# Patient Record
Sex: MALE | Race: BLACK OR AFRICAN AMERICAN | NOT HISPANIC OR LATINO
[De-identification: names, ages, dates, MRNs, and addresses within clinical notes are randomized per-mention and may not be internally consistent; named-entity substitution may affect disease eponyms.]

---

## 2017-01-03 ENCOUNTER — APPOINTMENT (OUTPATIENT)
Dept: OPHTHALMOLOGY | Facility: CLINIC | Age: 57
End: 2017-01-03

## 2017-06-26 ENCOUNTER — INPATIENT (INPATIENT)
Facility: HOSPITAL | Age: 57
LOS: 1 days | Discharge: HOME | End: 2017-06-28
Attending: INTERNAL MEDICINE | Admitting: INTERNAL MEDICINE
Payer: COMMERCIAL

## 2017-06-26 VITALS
HEART RATE: 92 BPM | WEIGHT: 179.9 LBS | OXYGEN SATURATION: 99 % | DIASTOLIC BLOOD PRESSURE: 80 MMHG | TEMPERATURE: 98 F | SYSTOLIC BLOOD PRESSURE: 153 MMHG | HEIGHT: 71 IN | RESPIRATION RATE: 20 BRPM

## 2017-06-26 LAB
ACETONE SERPL-MCNC: NEGATIVE — SIGNIFICANT CHANGE UP
ALBUMIN SERPL ELPH-MCNC: 3.6 G/DL — SIGNIFICANT CHANGE UP (ref 3.3–5)
ALP SERPL-CCNC: 89 U/L — SIGNIFICANT CHANGE UP (ref 40–120)
ALT FLD-CCNC: 21 U/L — SIGNIFICANT CHANGE UP (ref 12–78)
ANION GAP SERPL CALC-SCNC: 5 MMOL/L — SIGNIFICANT CHANGE UP (ref 5–17)
APPEARANCE UR: CLEAR — SIGNIFICANT CHANGE UP
AST SERPL-CCNC: 11 U/L — LOW (ref 15–37)
BACTERIA # UR AUTO: ABNORMAL
BASOPHILS # BLD AUTO: 0 K/UL — SIGNIFICANT CHANGE UP (ref 0–0.2)
BASOPHILS NFR BLD AUTO: 0.7 % — SIGNIFICANT CHANGE UP (ref 0–2)
BILIRUB SERPL-MCNC: 0.3 MG/DL — SIGNIFICANT CHANGE UP (ref 0.2–1.2)
BILIRUB UR-MCNC: NEGATIVE — SIGNIFICANT CHANGE UP
BUN SERPL-MCNC: 34 MG/DL — HIGH (ref 7–23)
CALCIUM SERPL-MCNC: 8.1 MG/DL — LOW (ref 8.5–10.1)
CHLORIDE SERPL-SCNC: 107 MMOL/L — SIGNIFICANT CHANGE UP (ref 96–108)
CO2 SERPL-SCNC: 23 MMOL/L — SIGNIFICANT CHANGE UP (ref 22–31)
COLOR SPEC: YELLOW — SIGNIFICANT CHANGE UP
CREAT SERPL-MCNC: 2.25 MG/DL — HIGH (ref 0.5–1.3)
DIFF PNL FLD: NEGATIVE — SIGNIFICANT CHANGE UP
EOSINOPHIL # BLD AUTO: 0 K/UL — SIGNIFICANT CHANGE UP (ref 0–0.5)
EOSINOPHIL NFR BLD AUTO: 0.5 % — SIGNIFICANT CHANGE UP (ref 0–6)
EPI CELLS # UR: NEGATIVE — SIGNIFICANT CHANGE UP
GLUCOSE SERPL-MCNC: 421 MG/DL — HIGH (ref 70–99)
GLUCOSE UR QL: 1000 MG/DL
HCT VFR BLD CALC: 42 % — SIGNIFICANT CHANGE UP (ref 39–50)
HGB BLD-MCNC: 13.9 G/DL — SIGNIFICANT CHANGE UP (ref 13–17)
HYALINE CASTS # UR AUTO: ABNORMAL /LPF
KETONES UR-MCNC: NEGATIVE — SIGNIFICANT CHANGE UP
LEUKOCYTE ESTERASE UR-ACNC: NEGATIVE — SIGNIFICANT CHANGE UP
LYMPHOCYTES # BLD AUTO: 0.9 K/UL — LOW (ref 1–3.3)
LYMPHOCYTES # BLD AUTO: 12.2 % — LOW (ref 13–44)
MCHC RBC-ENTMCNC: 25.1 PG — LOW (ref 27–34)
MCHC RBC-ENTMCNC: 33 GM/DL — SIGNIFICANT CHANGE UP (ref 32–36)
MCV RBC AUTO: 76.2 FL — LOW (ref 80–100)
MONOCYTES # BLD AUTO: 0.3 K/UL — SIGNIFICANT CHANGE UP (ref 0–0.9)
MONOCYTES NFR BLD AUTO: 3.8 % — SIGNIFICANT CHANGE UP (ref 2–14)
NEUTROPHILS # BLD AUTO: 5.9 K/UL — SIGNIFICANT CHANGE UP (ref 1.8–7.4)
NEUTROPHILS NFR BLD AUTO: 82.9 % — HIGH (ref 43–77)
NITRITE UR-MCNC: NEGATIVE — SIGNIFICANT CHANGE UP
PH UR: 5 — SIGNIFICANT CHANGE UP (ref 5–8)
PLATELET # BLD AUTO: 236 K/UL — SIGNIFICANT CHANGE UP (ref 150–400)
POTASSIUM SERPL-MCNC: 6.4 MMOL/L — CRITICAL HIGH (ref 3.5–5.3)
POTASSIUM SERPL-SCNC: 6.4 MMOL/L — CRITICAL HIGH (ref 3.5–5.3)
PROT SERPL-MCNC: 7 GM/DL — SIGNIFICANT CHANGE UP (ref 6–8.3)
PROT UR-MCNC: 100 MG/DL
RBC # BLD: 5.51 M/UL — SIGNIFICANT CHANGE UP (ref 4.2–5.8)
RBC # FLD: 15.7 % — HIGH (ref 11–15)
RBC CASTS # UR COMP ASSIST: SIGNIFICANT CHANGE UP /HPF (ref 0–4)
SODIUM SERPL-SCNC: 135 MMOL/L — SIGNIFICANT CHANGE UP (ref 135–145)
SP GR SPEC: 1.01 — SIGNIFICANT CHANGE UP (ref 1.01–1.02)
UROBILINOGEN FLD QL: NEGATIVE MG/DL — SIGNIFICANT CHANGE UP
WBC # BLD: 7.1 K/UL — SIGNIFICANT CHANGE UP (ref 3.8–10.5)
WBC # FLD AUTO: 7.1 K/UL — SIGNIFICANT CHANGE UP (ref 3.8–10.5)

## 2017-06-26 PROCEDURE — 99285 EMERGENCY DEPT VISIT HI MDM: CPT

## 2017-06-26 PROCEDURE — 99223 1ST HOSP IP/OBS HIGH 75: CPT

## 2017-06-26 RX ORDER — DEXTROSE 50 % IN WATER 50 %
1 SYRINGE (ML) INTRAVENOUS ONCE
Qty: 0 | Refills: 0 | Status: DISCONTINUED | OUTPATIENT
Start: 2017-06-26 | End: 2017-06-27

## 2017-06-26 RX ORDER — DIPHENHYDRAMINE HCL 50 MG
25 CAPSULE ORAL ONCE
Qty: 0 | Refills: 0 | Status: COMPLETED | OUTPATIENT
Start: 2017-06-26 | End: 2017-06-26

## 2017-06-26 RX ORDER — INSULIN HUMAN 100 [IU]/ML
10 INJECTION, SOLUTION SUBCUTANEOUS ONCE
Qty: 0 | Refills: 0 | Status: COMPLETED | OUTPATIENT
Start: 2017-06-26 | End: 2017-06-26

## 2017-06-26 RX ORDER — CALCIUM GLUCONATE 100 MG/ML
2 VIAL (ML) INTRAVENOUS ONCE
Qty: 2 | Refills: 0 | Status: COMPLETED | OUTPATIENT
Start: 2017-06-26 | End: 2017-06-26

## 2017-06-26 RX ORDER — ALBUTEROL 90 UG/1
2.5 AEROSOL, METERED ORAL ONCE
Qty: 0 | Refills: 0 | Status: COMPLETED | OUTPATIENT
Start: 2017-06-26 | End: 2017-06-26

## 2017-06-26 RX ORDER — INSULIN GLARGINE 100 [IU]/ML
10 INJECTION, SOLUTION SUBCUTANEOUS ONCE
Qty: 0 | Refills: 0 | Status: COMPLETED | OUTPATIENT
Start: 2017-06-26 | End: 2017-06-27

## 2017-06-26 RX ORDER — HEPARIN SODIUM 5000 [USP'U]/ML
5000 INJECTION INTRAVENOUS; SUBCUTANEOUS EVERY 12 HOURS
Qty: 0 | Refills: 0 | Status: DISCONTINUED | OUTPATIENT
Start: 2017-06-26 | End: 2017-06-28

## 2017-06-26 RX ORDER — GLUCAGON INJECTION, SOLUTION 0.5 MG/.1ML
1 INJECTION, SOLUTION SUBCUTANEOUS ONCE
Qty: 0 | Refills: 0 | Status: DISCONTINUED | OUTPATIENT
Start: 2017-06-26 | End: 2017-06-27

## 2017-06-26 RX ORDER — INSULIN LISPRO 100/ML
VIAL (ML) SUBCUTANEOUS
Qty: 0 | Refills: 0 | Status: DISCONTINUED | OUTPATIENT
Start: 2017-06-26 | End: 2017-06-27

## 2017-06-26 RX ORDER — SODIUM CHLORIDE 9 MG/ML
1000 INJECTION, SOLUTION INTRAVENOUS
Qty: 0 | Refills: 0 | Status: DISCONTINUED | OUTPATIENT
Start: 2017-06-26 | End: 2017-06-27

## 2017-06-26 RX ORDER — DEXTROSE 50 % IN WATER 50 %
12.5 SYRINGE (ML) INTRAVENOUS ONCE
Qty: 0 | Refills: 0 | Status: DISCONTINUED | OUTPATIENT
Start: 2017-06-26 | End: 2017-06-27

## 2017-06-26 RX ORDER — DEXTROSE 50 % IN WATER 50 %
25 SYRINGE (ML) INTRAVENOUS ONCE
Qty: 0 | Refills: 0 | Status: DISCONTINUED | OUTPATIENT
Start: 2017-06-26 | End: 2017-06-27

## 2017-06-26 RX ORDER — SODIUM CHLORIDE 9 MG/ML
1000 INJECTION INTRAMUSCULAR; INTRAVENOUS; SUBCUTANEOUS
Qty: 0 | Refills: 0 | Status: DISCONTINUED | OUTPATIENT
Start: 2017-06-26 | End: 2017-06-28

## 2017-06-26 RX ORDER — SODIUM POLYSTYRENE SULFONATE 4.1 MEQ/G
30 POWDER, FOR SUSPENSION ORAL ONCE
Qty: 0 | Refills: 0 | Status: COMPLETED | OUTPATIENT
Start: 2017-06-26 | End: 2017-06-26

## 2017-06-26 RX ORDER — SODIUM CHLORIDE 9 MG/ML
3000 INJECTION INTRAMUSCULAR; INTRAVENOUS; SUBCUTANEOUS ONCE
Qty: 0 | Refills: 0 | Status: COMPLETED | OUTPATIENT
Start: 2017-06-26 | End: 2017-06-26

## 2017-06-26 RX ADMIN — SODIUM CHLORIDE 3000 MILLILITER(S): 9 INJECTION INTRAMUSCULAR; INTRAVENOUS; SUBCUTANEOUS at 19:45

## 2017-06-26 RX ADMIN — Medication 25 MILLIGRAM(S): at 19:45

## 2017-06-26 RX ADMIN — ALBUTEROL 2.5 MILLIGRAM(S): 90 AEROSOL, METERED ORAL at 22:09

## 2017-06-26 RX ADMIN — INSULIN HUMAN 10 UNIT(S): 100 INJECTION, SOLUTION SUBCUTANEOUS at 22:10

## 2017-06-26 RX ADMIN — SODIUM POLYSTYRENE SULFONATE 30 GRAM(S): 4.1 POWDER, FOR SUSPENSION ORAL at 22:10

## 2017-06-26 RX ADMIN — Medication 200 GRAM(S): at 22:47

## 2017-06-26 NOTE — ED PROVIDER NOTE - PHYSICAL EXAMINATION
Gen: Alert, Well appearing. NAD    Head: NC, AT, PERRL, EOMI, normal lids/conjunctiva   ENT: Bilateral TM WNL, normal hearing, patent oropharynx without erythema/exudate, uvula midline  Neck: supple, no tenderness/meningismus/JVD   Pulm: Bilateral clear BS, normal resp effort, no wheeze/stridor/retractions  CV: RRR, no M/R/G, +dist pulses   Abd: soft, NT/ND, +BS, no guarding/rebound tenderness  Mskel: no edema/erythema/cyanosis   Skin: ++ multiple papules to testicular and penile area.   Neuro: AAOx3, no sensory/motor deficits, CN 2-12 intact

## 2017-06-26 NOTE — H&P ADULT - NSHPPHYSICALEXAM_GEN_ALL_CORE
Vital Signs Last 24 Hrs  T(C): 36.4, Max: 36.7 (06-26 @ 19:06)  T(F): 97.6, Max: 98.1 (06-26 @ 19:06)  HR: 96 (88 - 96)  BP: 165/95 (150/89 - 165/95)  BP(mean): --  RR: 18 (18 - 20)  SpO2: 100% (98% - 100%)    PHYSICAL EXAM:  GENERAL: NAD, well-groomed, well-developed  HEAD:  Atraumatic, Normocephalic  EYES: EOMI, PERRLA, conjunctiva and sclera clear  ENMT: No tonsillar erythema, exudates, or enlargement; dry mucous membranes,  NECK: Supple, No JVD, Normal thyroid  NERVOUS SYSTEM:  Alert & Oriented X3, Good concentration; Motor Strength 5/5 B/L upper and lower extremities; decreased sensation both feet  CHEST/LUNG: Clear to percussion bilaterally; No rales, rhonchi, wheezing, or rubs  HEART: Regular rate and rhythm; No murmurs, rubs, or gallops  ABDOMEN: Soft, Nontender, Nondistended; Bowel sounds present  EXTREMITIES:  + Peripheral Pulses, No clubbing, cyanosis, or edema  LYMPH: No lymphadenopathy noted  SKIN: + rash most marked in scrotum

## 2017-06-26 NOTE — H&P ADULT - NSHPREVIEWOFSYSTEMS_GEN_ALL_CORE
REVIEW OF SYSTEMS:  CONSTITUTIONAL: No fever, weight loss, or fatigue  EYES: No eye pain, visual disturbances, or discharge  ENMT:  No difficulty hearing, tinnitus, vertigo; No sinus or throat pain  NECK: No pain or stiffness  RESPIRATORY: No cough, wheezing, chills or hemoptysis; No shortness of breath  CARDIOVASCULAR: No chest pain, palpitations, dizziness, or leg swelling  GASTROINTESTINAL: No abdominal or epigastric pain. No nausea, vomiting, or hematemesis; No diarrhea or constipation. No melena or hematochezia.  GENITOURINARY: No dysuria, frequency, hematuria, or incontinence  NEUROLOGICAL: No headaches, no memory loss, no loss of strength, + numbness, no tremors  SKIN: + itching, + rashes and lesions   LYMPH NODES: No enlarged glands  ENDOCRINE: No heat or cold intolerance; No hair loss  MUSCULOSKELETAL: No joint pain or swelling; No muscle, back, or extremity pain  PSYCHIATRIC: No depression, anxiety, mood swings, or difficulty sleeping  HEME/LYMPH: No easy bruising, or bleeding gums  ALLERGY AND IMMUNOLOGIC: No hives or eczema

## 2017-06-26 NOTE — H&P ADULT - ASSESSMENT
57 y/o diabetic hypertensive man, presents with hyperkalemia, hyperglycemia and JORGITO. Recently stated on prednisone for itching and rash.  Most likely uncontrolled diabetes due to steroids, with prerenal azotemia due to dehydration and secondary hyperkalemia Pt treated in ED with albuterol, calcium,  insulin and kayexelate, with saline bolus.

## 2017-06-26 NOTE — ED PROVIDER NOTE - CARE PLAN
Principal Discharge DX:	Acute renal failure  Secondary Diagnosis:	Hyperkalemia  Secondary Diagnosis:	Hyperglycemia

## 2017-06-26 NOTE — ED ADULT TRIAGE NOTE - CHIEF COMPLAINT QUOTE
Stated " I'm a diabetic , I'm on insulin , but lost my pump , had not  taking it for  2days, now I'm feeling itchy  all over , ,my feet are numbs since this morning "  No facial droop , no arm drift , equal b/l arms strength , onset of numbness was 10 hours ago

## 2017-06-26 NOTE — ED ADULT NURSE NOTE - OBJECTIVE STATEMENT
pt lost insulin pump, has not taken insulin since yesterday. pt c/o itching on back, belly, privates with hives

## 2017-06-26 NOTE — ED ADULT NURSE NOTE - PMH
Diabetes    Type II or unspecified type diabetes mellitus without mention of complication, not stated as uncontr    Unspecified essential hypertension

## 2017-06-26 NOTE — H&P ADULT - HISTORY OF PRESENT ILLNESS
57 y/o male with pmh DM2 on lantus and metformin, HTN, diffuse pruritus x 1 month with rash to groin area. Pt's PMD started him on tapering dose prednisone for rash, since then has had increased thirst and urination, but no improvement in rash. Pt has no change in housing, bedding, travel, detergents, meds, sexual activity. No fever, dysuria, diarrhea constipation. No fever/chills. No photophobia/eye pain/changes in vision, No ear pain/sore throat/dysphagia, No chest pain/palpitations. No SOB/cough/stridor. No abdominal pain, N/V/D, no black/bloody bm. No dysuria/frequency/discharge, No headache. No Dizziness.  + rash.  + numbness feet, no tingling/weakness. Pt on losartan with HCTZ which he has been taking.

## 2017-06-26 NOTE — H&P ADULT - PROBLEM SELECTOR PROBLEM 2
Type 2 diabetes mellitus with diabetic autonomic neuropathy, without long-term current use of insulin

## 2017-06-26 NOTE — H&P ADULT - NSHPLABSRESULTS_GEN_ALL_CORE
LABS:                        13.9   7.1   )-----------( 236      ( 2017 19:54 )             42.0         135  |  107  |  34<H>  ----------------------------<  421<H>  6.4<HH>   |  23  |  2.25<H>    Ca    8.1<L>      2017 20:37    TPro  7.0  /  Alb  3.6  /  TBili  0.3  /  DBili  x   /  AST  11<L>  /  ALT  21  /  AlkPhos  89        Urinalysis Basic - ( 2017 19:54 )    Color: Yellow / Appearance: Clear / S.010 / pH: x  Gluc: x / Ketone: Negative  / Bili: Negative / Urobili: Negative mg/dL   Blood: x / Protein: 100 mg/dL / Nitrite: Negative   Leuk Esterase: Negative / RBC: Occasional /HPF / WBC x   Sq Epi: x / Non Sq Epi: Negative / Bacteria: Occasional      CAPILLARY BLOOD GLUCOSE  111 (2017 23:59)  491 (2017 17:53)      RADIOLOGY & ADDITIONAL TESTS:    Imaging Personally Reviewed:  [ ] YES  [ ] NO LABS:                        13.9   7.1   )-----------( 236      ( 2017 19:54 )             42.0         135  |  107  |  34<H>  ----------------------------<  421<H>  6.4<HH>   |  23  |  2.25<H>    Ca    8.1<L>      2017 20:37    TPro  7.0  /  Alb  3.6  /  TBili  0.3  /  DBili  x   /  AST  11<L>  /  ALT  21  /  AlkPhos  89        Urinalysis Basic - ( 2017 19:54 )    Color: Yellow / Appearance: Clear / S.010 / pH: x  Gluc: x / Ketone: Negative  / Bili: Negative / Urobili: Negative mg/dL   Blood: x / Protein: 100 mg/dL / Nitrite: Negative   Leuk Esterase: Negative / RBC: Occasional /HPF / WBC x   Sq Epi: x / Non Sq Epi: Negative / Bacteria: Occasional      CAPILLARY BLOOD GLUCOSE  111 (2017 23:59)  491 (2017 17:53)      RADIOLOGY & ADDITIONAL TESTS:    CXR: enlarged heart, no infiltrate    Imaging Personally Reviewed:  [ x] YES  [ ] NO    EKG: sinus@83 T wave inversions 1, AVL, V6

## 2017-06-26 NOTE — ED PROVIDER NOTE - OBJECTIVE STATEMENT
57yo male with pmh DM on insulin pump presents with lost insulin pump, diffuse pruritus x 1 month with rash to groin area and b/l plantar feet numbness noted today. Pt's friend threw insulin pump away accidentally yesterday. Pt seen pmd for pruritus and given prednisone but reports not helping. no new hosing, detergents, meds, sexual activity. No fever, dysuria, diarrhea constipation. Pt reports plantar numbness is intermittent for a long time, related to DM and started again today.     ROS: No fever/chills. No photophobia/eye pain/changes in vision, No ear pain/sore throat/dysphagia, No chest pain/palpitations. No SOB/cough/stridor. No abdominal pain, N/V/D, no black/bloody bm. No dysuria/frequency/discharge, No headache. No Dizziness.  + rash.  + numbness, no tingling/weakness.

## 2017-06-27 DIAGNOSIS — I10 ESSENTIAL (PRIMARY) HYPERTENSION: ICD-10-CM

## 2017-06-27 DIAGNOSIS — N17.9 ACUTE KIDNEY FAILURE, UNSPECIFIED: ICD-10-CM

## 2017-06-27 DIAGNOSIS — R21 RASH AND OTHER NONSPECIFIC SKIN ERUPTION: ICD-10-CM

## 2017-06-27 DIAGNOSIS — E87.5 HYPERKALEMIA: ICD-10-CM

## 2017-06-27 DIAGNOSIS — E11.43 TYPE 2 DIABETES MELLITUS WITH DIABETIC AUTONOMIC (POLY)NEUROPATHY: ICD-10-CM

## 2017-06-27 LAB
ANION GAP SERPL CALC-SCNC: 3 MMOL/L — LOW (ref 5–17)
ANION GAP SERPL CALC-SCNC: 7 MMOL/L — SIGNIFICANT CHANGE UP (ref 5–17)
BASOPHILS # BLD AUTO: 0 K/UL — SIGNIFICANT CHANGE UP (ref 0–0.2)
BASOPHILS NFR BLD AUTO: 0.5 % — SIGNIFICANT CHANGE UP (ref 0–2)
BUN SERPL-MCNC: 24 MG/DL — HIGH (ref 7–23)
BUN SERPL-MCNC: 29 MG/DL — HIGH (ref 7–23)
CALCIUM SERPL-MCNC: 8.1 MG/DL — LOW (ref 8.5–10.1)
CALCIUM SERPL-MCNC: 8.6 MG/DL — SIGNIFICANT CHANGE UP (ref 8.5–10.1)
CHLORIDE SERPL-SCNC: 113 MMOL/L — HIGH (ref 96–108)
CHLORIDE SERPL-SCNC: 113 MMOL/L — HIGH (ref 96–108)
CO2 SERPL-SCNC: 23 MMOL/L — SIGNIFICANT CHANGE UP (ref 22–31)
CO2 SERPL-SCNC: 27 MMOL/L — SIGNIFICANT CHANGE UP (ref 22–31)
CREAT ?TM UR-MCNC: 49 MG/DL — SIGNIFICANT CHANGE UP
CREAT SERPL-MCNC: 1.64 MG/DL — HIGH (ref 0.5–1.3)
CREAT SERPL-MCNC: 1.94 MG/DL — HIGH (ref 0.5–1.3)
CRP SERPL-MCNC: 0.9 MG/DL — HIGH (ref 0–0.4)
CULTURE RESULTS: NO GROWTH — SIGNIFICANT CHANGE UP
EOSINOPHIL # BLD AUTO: 0.2 K/UL — SIGNIFICANT CHANGE UP (ref 0–0.5)
EOSINOPHIL NFR BLD AUTO: 2.2 % — SIGNIFICANT CHANGE UP (ref 0–6)
GLUCOSE SERPL-MCNC: 153 MG/DL — HIGH (ref 70–99)
GLUCOSE SERPL-MCNC: 163 MG/DL — HIGH (ref 70–99)
HBA1C BLD-MCNC: 9.7 % — HIGH (ref 4–5.6)
HCT VFR BLD CALC: 33.8 % — LOW (ref 39–50)
HGB BLD-MCNC: 11.5 G/DL — LOW (ref 13–17)
LYMPHOCYTES # BLD AUTO: 1.6 K/UL — SIGNIFICANT CHANGE UP (ref 1–3.3)
LYMPHOCYTES # BLD AUTO: 21.6 % — SIGNIFICANT CHANGE UP (ref 13–44)
MCHC RBC-ENTMCNC: 25.2 PG — LOW (ref 27–34)
MCHC RBC-ENTMCNC: 33.9 GM/DL — SIGNIFICANT CHANGE UP (ref 32–36)
MCV RBC AUTO: 74.4 FL — LOW (ref 80–100)
MONOCYTES # BLD AUTO: 0.8 K/UL — SIGNIFICANT CHANGE UP (ref 0–0.9)
MONOCYTES NFR BLD AUTO: 11.2 % — SIGNIFICANT CHANGE UP (ref 2–14)
NEUTROPHILS # BLD AUTO: 4.8 K/UL — SIGNIFICANT CHANGE UP (ref 1.8–7.4)
NEUTROPHILS NFR BLD AUTO: 64.6 % — SIGNIFICANT CHANGE UP (ref 43–77)
PLATELET # BLD AUTO: 164 K/UL — SIGNIFICANT CHANGE UP (ref 150–400)
POTASSIUM SERPL-MCNC: 3.9 MMOL/L — SIGNIFICANT CHANGE UP (ref 3.5–5.3)
POTASSIUM SERPL-MCNC: 5.2 MMOL/L — SIGNIFICANT CHANGE UP (ref 3.5–5.3)
POTASSIUM SERPL-SCNC: 3.9 MMOL/L — SIGNIFICANT CHANGE UP (ref 3.5–5.3)
POTASSIUM SERPL-SCNC: 5.2 MMOL/L — SIGNIFICANT CHANGE UP (ref 3.5–5.3)
PROCALCITONIN SERPL-MCNC: <0.05 NG/ML — SIGNIFICANT CHANGE UP (ref 0–0.04)
PROT ?TM UR-MCNC: 27 MG/DL — HIGH (ref 0–12)
PROT/CREAT UR-RTO: 0.6 RATIO — HIGH (ref 0–0.2)
RBC # BLD: 4.55 M/UL — SIGNIFICANT CHANGE UP (ref 4.2–5.8)
RBC # FLD: 15 % — SIGNIFICANT CHANGE UP (ref 11–15)
SODIUM SERPL-SCNC: 143 MMOL/L — SIGNIFICANT CHANGE UP (ref 135–145)
SODIUM SERPL-SCNC: 143 MMOL/L — SIGNIFICANT CHANGE UP (ref 135–145)
SPECIMEN SOURCE: SIGNIFICANT CHANGE UP
T PALLIDUM AB TITR SER: NEGATIVE — SIGNIFICANT CHANGE UP
VIT B12 SERPL-MCNC: 721 PG/ML — SIGNIFICANT CHANGE UP (ref 243–894)
WBC # BLD: 7.4 K/UL — SIGNIFICANT CHANGE UP (ref 3.8–10.5)
WBC # FLD AUTO: 7.4 K/UL — SIGNIFICANT CHANGE UP (ref 3.8–10.5)

## 2017-06-27 PROCEDURE — 71010: CPT | Mod: 26

## 2017-06-27 PROCEDURE — 99232 SBSQ HOSP IP/OBS MODERATE 35: CPT

## 2017-06-27 RX ORDER — DEXTROSE 50 % IN WATER 50 %
25 SYRINGE (ML) INTRAVENOUS ONCE
Qty: 0 | Refills: 0 | Status: DISCONTINUED | OUTPATIENT
Start: 2017-06-27 | End: 2017-06-28

## 2017-06-27 RX ORDER — SODIUM CHLORIDE 9 MG/ML
1000 INJECTION, SOLUTION INTRAVENOUS
Qty: 0 | Refills: 0 | Status: DISCONTINUED | OUTPATIENT
Start: 2017-06-27 | End: 2017-06-28

## 2017-06-27 RX ORDER — GLUCAGON INJECTION, SOLUTION 0.5 MG/.1ML
1 INJECTION, SOLUTION SUBCUTANEOUS ONCE
Qty: 0 | Refills: 0 | Status: DISCONTINUED | OUTPATIENT
Start: 2017-06-27 | End: 2017-06-28

## 2017-06-27 RX ORDER — DEXTROSE 50 % IN WATER 50 %
12.5 SYRINGE (ML) INTRAVENOUS ONCE
Qty: 0 | Refills: 0 | Status: DISCONTINUED | OUTPATIENT
Start: 2017-06-27 | End: 2017-06-28

## 2017-06-27 RX ORDER — INSULIN GLARGINE 100 [IU]/ML
15 INJECTION, SOLUTION SUBCUTANEOUS EVERY MORNING
Qty: 0 | Refills: 0 | Status: DISCONTINUED | OUTPATIENT
Start: 2017-06-27 | End: 2017-06-28

## 2017-06-27 RX ORDER — ASPIRIN/CALCIUM CARB/MAGNESIUM 324 MG
81 TABLET ORAL DAILY
Qty: 0 | Refills: 0 | Status: DISCONTINUED | OUTPATIENT
Start: 2017-06-27 | End: 2017-06-28

## 2017-06-27 RX ORDER — HYDROXYZINE HCL 10 MG
10 TABLET ORAL
Qty: 0 | Refills: 0 | Status: DISCONTINUED | OUTPATIENT
Start: 2017-06-27 | End: 2017-06-28

## 2017-06-27 RX ORDER — KETOCONAZOLE 20 MG/G
1 AEROSOL, FOAM TOPICAL
Qty: 0 | Refills: 0 | Status: DISCONTINUED | OUTPATIENT
Start: 2017-06-27 | End: 2017-06-28

## 2017-06-27 RX ORDER — DEXTROSE 50 % IN WATER 50 %
1 SYRINGE (ML) INTRAVENOUS ONCE
Qty: 0 | Refills: 0 | Status: DISCONTINUED | OUTPATIENT
Start: 2017-06-27 | End: 2017-06-28

## 2017-06-27 RX ORDER — INSULIN LISPRO 100/ML
VIAL (ML) SUBCUTANEOUS
Qty: 0 | Refills: 0 | Status: DISCONTINUED | OUTPATIENT
Start: 2017-06-27 | End: 2017-06-28

## 2017-06-27 RX ORDER — AMLODIPINE BESYLATE 2.5 MG/1
5 TABLET ORAL DAILY
Qty: 0 | Refills: 0 | Status: DISCONTINUED | OUTPATIENT
Start: 2017-06-27 | End: 2017-06-28

## 2017-06-27 RX ORDER — PANTOPRAZOLE SODIUM 20 MG/1
40 TABLET, DELAYED RELEASE ORAL
Qty: 0 | Refills: 0 | Status: DISCONTINUED | OUTPATIENT
Start: 2017-06-27 | End: 2017-06-28

## 2017-06-27 RX ORDER — INSULIN GLARGINE 100 [IU]/ML
15 INJECTION, SOLUTION SUBCUTANEOUS AT BEDTIME
Qty: 0 | Refills: 0 | Status: DISCONTINUED | OUTPATIENT
Start: 2017-06-27 | End: 2017-06-28

## 2017-06-27 RX ORDER — INSULIN LISPRO 100/ML
VIAL (ML) SUBCUTANEOUS AT BEDTIME
Qty: 0 | Refills: 0 | Status: DISCONTINUED | OUTPATIENT
Start: 2017-06-27 | End: 2017-06-28

## 2017-06-27 RX ADMIN — Medication 2: at 11:48

## 2017-06-27 RX ADMIN — Medication 81 MILLIGRAM(S): at 11:50

## 2017-06-27 RX ADMIN — Medication 1: at 07:49

## 2017-06-27 RX ADMIN — INSULIN GLARGINE 15 UNIT(S): 100 INJECTION, SOLUTION SUBCUTANEOUS at 22:36

## 2017-06-27 RX ADMIN — INSULIN GLARGINE 10 UNIT(S): 100 INJECTION, SOLUTION SUBCUTANEOUS at 00:14

## 2017-06-27 RX ADMIN — KETOCONAZOLE 1 APPLICATION(S): 20 AEROSOL, FOAM TOPICAL at 17:13

## 2017-06-27 RX ADMIN — HEPARIN SODIUM 5000 UNIT(S): 5000 INJECTION INTRAVENOUS; SUBCUTANEOUS at 05:20

## 2017-06-27 RX ADMIN — AMLODIPINE BESYLATE 5 MILLIGRAM(S): 2.5 TABLET ORAL at 11:48

## 2017-06-27 RX ADMIN — PANTOPRAZOLE SODIUM 40 MILLIGRAM(S): 20 TABLET, DELAYED RELEASE ORAL at 07:51

## 2017-06-27 RX ADMIN — Medication 10 MILLIGRAM(S): at 17:13

## 2017-06-27 RX ADMIN — HEPARIN SODIUM 5000 UNIT(S): 5000 INJECTION INTRAVENOUS; SUBCUTANEOUS at 17:12

## 2017-06-27 RX ADMIN — SODIUM CHLORIDE 100 MILLILITER(S): 9 INJECTION INTRAMUSCULAR; INTRAVENOUS; SUBCUTANEOUS at 01:38

## 2017-06-27 RX ADMIN — Medication 2: at 23:43

## 2017-06-27 RX ADMIN — Medication 2: at 17:11

## 2017-06-27 RX ADMIN — SODIUM CHLORIDE 100 MILLILITER(S): 9 INJECTION INTRAMUSCULAR; INTRAVENOUS; SUBCUTANEOUS at 22:35

## 2017-06-27 RX ADMIN — SODIUM CHLORIDE 100 MILLILITER(S): 9 INJECTION INTRAMUSCULAR; INTRAVENOUS; SUBCUTANEOUS at 17:15

## 2017-06-27 NOTE — PROGRESS NOTE ADULT - PROBLEM SELECTOR PLAN 2
lantus, insulin coverage   check hgba1c    hold metformin in light of arf   FS this am wnl lantus, insulin coverage   check hgba1c    hold metformin in light of arf   FS this am wnl now that on insulin   f/u hgba1c   may need insulin longterm was only on oral agents as outtpt.

## 2017-06-27 NOTE — PROGRESS NOTE ADULT - SUBJECTIVE AND OBJECTIVE BOX
Patient is a 56y old  Male who presents with a chief complaint of Uncontrolled diabetes and hyperkalemia. (2017 23:50)      OVERNIGHT EVENTS:      REVIEW OF SYSTEMS: denies chest pain/SOB, diaphoresis, no F/C, cough, dizziness, headache, blurry vision, nausea, vomiting, abdominal pain. Rest unremarkable     MEDICATIONS  (STANDING):  insulin lispro (HumaLOG) corrective regimen sliding scale   SubCutaneous three times a day before meals  dextrose 5%. 1000 milliLiter(s) (50 mL/Hr) IV Continuous <Continuous>  dextrose 50% Injectable 12.5 Gram(s) IV Push once  dextrose 50% Injectable 25 Gram(s) IV Push once  dextrose 50% Injectable 25 Gram(s) IV Push once  sodium chloride 0.9%. 1000 milliLiter(s) (100 mL/Hr) IV Continuous <Continuous>  heparin  Injectable 5000 Unit(s) SubCutaneous every 12 hours  aspirin enteric coated 81 milliGRAM(s) Oral daily  pantoprazole    Tablet 40 milliGRAM(s) Oral before breakfast    MEDICATIONS  (PRN):  dextrose Gel 1 Dose(s) Oral once PRN Blood Glucose LESS THAN 70 milliGRAM(s)/deciliter  glucagon  Injectable 1 milliGRAM(s) IntraMuscular once PRN Glucose LESS THAN 70 milligrams/deciliter    Allergies    ampicillin (Swelling)  penicillins (Swelling)    Intolerances        SUBJECTIVE: in bed in NAD, no acute events overnight     T(F): 98.2 (17 @ 04:56), Max: 98.8 (17 @ 00:54)  HR: 88 (17 @ 04:56) (88 - 96)  BP: 153/90 (17 @ 04:56) (150/89 - 166/94)  RR: 18 (17 @ 04:56) (18 - 20)  SpO2: 98% (17 @ 04:56) (97% - 100%)  Wt(kg): --    PHYSICAL EXAM:  GENERAL: NAD, well-groomed, well-developed  HEAD:  Atraumatic, Normocephalic  EYES: EOMI, PERRLA, conjunctiva and sclera clear  ENMT: No tonsillar erythema, exudates, or enlargement; Moist mucous membranes, Good dentition, No lesions  NECK: Supple, No JVD, Normal thyroid  CHEST/LUNG: Clear to  auscultation bilaterally; No rales, rhonchi, wheezing, or rubs  bilaterally  HEART: Regular rate and rhythm; No murmurs, rubs, or gallops  ABDOMEN: Soft, Nontender, Nondistended; Bowel sounds present  EXTREMITIES:  2+ Peripheral Pulses, No clubbing, cyanosis, or edema BL LE  LYMPH: No lymphadenopathy noted  SKIN: No rashes or lesions  NERVOUS SYSTEM:  Alert & Oriented X3, Good concentration; Motor Strength 5/5 B/L upper and lower extremities;   DTRs 2+ intact and symmetric, sensation intact BL    LABS:                        11.5   7.4   )-----------( 164      ( 2017 07:30 )             33.8         143  |  113<H>  |  24<H>  ----------------------------<  153<H>  3.9   |  23  |  1.64<H>    Ca    8.1<L>      2017 07:30    TPro  7.0  /  Alb  3.6  /  TBili  0.3  /  DBili  x   /  AST  11<L>  /  ALT  21  /  AlkPhos  89        Urinalysis Basic - ( 2017 19:54 )    Color: Yellow / Appearance: Clear / S.010 / pH: x  Gluc: x / Ketone: Negative  / Bili: Negative / Urobili: Negative mg/dL   Blood: x / Protein: 100 mg/dL / Nitrite: Negative   Leuk Esterase: Negative / RBC: x / WBC x   Sq Epi: x / Non Sq Epi: Negative / Bacteria: x      Cultures;   CAPILLARY BLOOD GLUCOSE  151 (2017 07:35)  111 (2017 23:59)  491 (2017 17:53)        Lipid panel:           RADIOLOGY & ADDITIONAL TESTS:      Imaging Personally Reviewed:  [ ] YES      Consultant(s) Notes Reviewed:  [ ] YES     Care Discussed with [ ] Consultants [X ] Patient [ ] Family  [x ]    [x ]  Other; RN Patient is a 56y old  Male who presents with a chief complaint of Uncontrolled diabetes and hyperkalemia. (2017 23:50)      OVERNIGHT EVENTS:      REVIEW OF SYSTEMS: denies chest pain/SOB, diaphoresis, no F/C, cough, dizziness, headache, blurry vision, nausea, vomiting, abdominal pain. Rest unremarkable     MEDICATIONS  (STANDING):  insulin lispro (HumaLOG) corrective regimen sliding scale   SubCutaneous three times a day before meals  dextrose 5%. 1000 milliLiter(s) (50 mL/Hr) IV Continuous <Continuous>  dextrose 50% Injectable 12.5 Gram(s) IV Push once  dextrose 50% Injectable 25 Gram(s) IV Push once  dextrose 50% Injectable 25 Gram(s) IV Push once  sodium chloride 0.9%. 1000 milliLiter(s) (100 mL/Hr) IV Continuous <Continuous>  heparin  Injectable 5000 Unit(s) SubCutaneous every 12 hours  aspirin enteric coated 81 milliGRAM(s) Oral daily  pantoprazole    Tablet 40 milliGRAM(s) Oral before breakfast    MEDICATIONS  (PRN):  dextrose Gel 1 Dose(s) Oral once PRN Blood Glucose LESS THAN 70 milliGRAM(s)/deciliter  glucagon  Injectable 1 milliGRAM(s) IntraMuscular once PRN Glucose LESS THAN 70 milligrams/deciliter    Allergies    ampicillin (Swelling)  penicillins (Swelling)    Intolerances        SUBJECTIVE: in bed in NAD, no acute events overnight     T(F): 98.2 (17 @ 04:56), Max: 98.8 (17 @ 00:54)  HR: 88 (17 @ 04:56) (88 - 96)  BP: 153/90 (17 @ 04:56) (150/89 - 166/94)  RR: 18 (17 @ 04:56) (18 - 20)  SpO2: 98% (17 @ 04:56) (97% - 100%)  Wt(kg): --    PHYSICAL EXAM:  GENERAL: NAD, well-groomed, well-developed  HEAD:  Atraumatic, Normocephalic  EYES: EOMI, PERRLA, conjunctiva and sclera clear  ENMT: No tonsillar erythema, exudates, or enlargement; Moist mucous membranes, Good dentition, No lesions  NECK: Supple, No JVD, Normal thyroid  CHEST/LUNG: Clear to  auscultation bilaterally; No rales, rhonchi, wheezing, or rubs  bilaterally  HEART: Regular rate and rhythm; No murmurs, rubs, or gallops  ABDOMEN: Soft, Nontender, Nondistended; Bowel sounds present  EXTREMITIES:  2+ Peripheral Pulses, No clubbing, cyanosis, or edema BL LE  LYMPH: No lymphadenopathy noted  SKIN: tinea versicolro to left chest near collar bone, flesh colored papules on scrotum itchy, not tender No rashes or lesions  NERVOUS SYSTEM:  Alert & Oriented X3, Good concentration; Motor Strength 5/5 B/L upper and lower extremities;   DTRs 2+ intact and symmetric, sensation intact BL    LABS:                        11.5   7.4   )-----------( 164      ( 2017 07:30 )             33.8         143  |  113<H>  |  24<H>  ----------------------------<  153<H>  3.9   |  23  |  1.64<H>    Ca    8.1<L>      2017 07:30    TPro  7.0  /  Alb  3.6  /  TBili  0.3  /  DBili  x   /  AST  11<L>  /  ALT  21  /  AlkPhos  89        Urinalysis Basic - ( 2017 19:54 )    Color: Yellow / Appearance: Clear / S.010 / pH: x  Gluc: x / Ketone: Negative  / Bili: Negative / Urobili: Negative mg/dL   Blood: x / Protein: 100 mg/dL / Nitrite: Negative   Leuk Esterase: Negative / RBC: x / WBC x   Sq Epi: x / Non Sq Epi: Negative / Bacteria: x      Cultures;   CAPILLARY BLOOD GLUCOSE  151 (2017 07:35)  111 (2017 23:59)  491 (2017 17:53)        Lipid panel:           RADIOLOGY & ADDITIONAL TESTS:      Imaging Personally Reviewed:  [ ] YES      Consultant(s) Notes Reviewed:  [ ] YES     Care Discussed with [ ] Consultants [X ] Patient [ ] Family  [x ]    [x ]  Other; RN Patient is a 56y old  Male who presents with a chief complaint of Uncontrolled diabetes and hyperkalemia. (2017 23:50)      OVERNIGHT EVENTS: none      REVIEW OF SYSTEMS: denies chest pain/SOB, diaphoresis, no F/C, cough, dizziness, headache, blurry vision, nausea, vomiting, abdominal pain. Rest unremarkable     MEDICATIONS  (STANDING):  insulin lispro (HumaLOG) corrective regimen sliding scale   SubCutaneous three times a day before meals  dextrose 5%. 1000 milliLiter(s) (50 mL/Hr) IV Continuous <Continuous>  dextrose 50% Injectable 12.5 Gram(s) IV Push once  dextrose 50% Injectable 25 Gram(s) IV Push once  dextrose 50% Injectable 25 Gram(s) IV Push once  sodium chloride 0.9%. 1000 milliLiter(s) (100 mL/Hr) IV Continuous <Continuous>  heparin  Injectable 5000 Unit(s) SubCutaneous every 12 hours  aspirin enteric coated 81 milliGRAM(s) Oral daily  pantoprazole    Tablet 40 milliGRAM(s) Oral before breakfast    MEDICATIONS  (PRN):  dextrose Gel 1 Dose(s) Oral once PRN Blood Glucose LESS THAN 70 milliGRAM(s)/deciliter  glucagon  Injectable 1 milliGRAM(s) IntraMuscular once PRN Glucose LESS THAN 70 milligrams/deciliter    Allergies    ampicillin (Swelling)  penicillins (Swelling)    Intolerances        SUBJECTIVE: in bed in NAD, no acute events overnight     T(F): 98.2 (17 @ 04:56), Max: 98.8 (17 @ 00:54)  HR: 88 (17 @ 04:56) (88 - 96)  BP: 153/90 (17 @ 04:56) (150/89 - 166/94)  RR: 18 (17 @ 04:56) (18 - 20)  SpO2: 98% (17 @ 04:56) (97% - 100%)  Wt(kg): --    PHYSICAL EXAM:  GENERAL: NAD, well-groomed, well-developed  HEAD:  Atraumatic, Normocephalic  EYES: EOMI, PERRLA, conjunctiva and sclera clear  ENMT: No tonsillar erythema, exudates, or enlargement; Moist mucous membranes, Good dentition, No lesions  NECK: Supple, No JVD, Normal thyroid  CHEST/LUNG: Clear to  auscultation bilaterally; No rales, rhonchi, wheezing, or rubs  bilaterally  HEART: Regular rate and rhythm; No murmurs, rubs, or gallops  ABDOMEN: Soft, Nontender, Nondistended; Bowel sounds present  EXTREMITIES:  2+ Peripheral Pulses, No clubbing, cyanosis, or edema BL LE  LYMPH: No lymphadenopathy noted  SKIN: tinea versicolro to left chest near collar bone, flesh colored papules on scrotum itchy, not tender No rashes or lesions  NERVOUS SYSTEM:  Alert & Oriented X3, Good concentration; Motor Strength 5/5 B/L upper and lower extremities;   DTRs 2+ intact and symmetric, sensation intact BL    LABS:                        11.5   7.4   )-----------( 164      ( 2017 07:30 )             33.8         143  |  113<H>  |  24<H>  ----------------------------<  153<H>  3.9   |  23  |  1.64<H>    Ca    8.1<L>      2017 07:30    TPro  7.0  /  Alb  3.6  /  TBili  0.3  /  DBili  x   /  AST  11<L>  /  ALT  21  /  AlkPhos  89        Urinalysis Basic - ( 2017 19:54 )    Color: Yellow / Appearance: Clear / S.010 / pH: x  Gluc: x / Ketone: Negative  / Bili: Negative / Urobili: Negative mg/dL   Blood: x / Protein: 100 mg/dL / Nitrite: Negative   Leuk Esterase: Negative / RBC: x / WBC x   Sq Epi: x / Non Sq Epi: Negative / Bacteria: x      Cultures;   CAPILLARY BLOOD GLUCOSE  151 (2017 07:35)  111 (2017 23:59)  491 (2017 17:53)        Lipid panel:           RADIOLOGY & ADDITIONAL TESTS:      Imaging Personally Reviewed:  [ ] YES      Consultant(s) Notes Reviewed:  [ ] YES     Care Discussed with [ ] Consultants [X ] Patient [ ] Family  [x ]    [x ]  Other; RN

## 2017-06-27 NOTE — PROGRESS NOTE ADULT - PROBLEM SELECTOR PLAN 5
felt due to tinea versicolor will start ketoconazole. advised pt to f/u with dermatology . and have tight blood sugar control..

## 2017-06-27 NOTE — PROGRESS NOTE ADULT - PROBLEM SELECTOR PLAN 1
most likely prerenal, possibly exacerbated by ARB with diuretic. it is improving ..   IV fluids, follow kidney function

## 2017-06-28 VITALS — HEART RATE: 86 BPM

## 2017-06-28 LAB
ANION GAP SERPL CALC-SCNC: 6 MMOL/L — SIGNIFICANT CHANGE UP (ref 5–17)
BUN SERPL-MCNC: 19 MG/DL — SIGNIFICANT CHANGE UP (ref 7–23)
CALCIUM SERPL-MCNC: 8.4 MG/DL — LOW (ref 8.5–10.1)
CHLORIDE SERPL-SCNC: 111 MMOL/L — HIGH (ref 96–108)
CHOLEST SERPL-MCNC: 196 MG/DL — SIGNIFICANT CHANGE UP (ref 10–199)
CO2 SERPL-SCNC: 27 MMOL/L — SIGNIFICANT CHANGE UP (ref 22–31)
CREAT SERPL-MCNC: 1.57 MG/DL — HIGH (ref 0.5–1.3)
GLUCOSE SERPL-MCNC: 169 MG/DL — HIGH (ref 70–99)
HCT VFR BLD CALC: 35.3 % — LOW (ref 39–50)
HDLC SERPL-MCNC: 33 MG/DL — LOW (ref 40–125)
HGB BLD-MCNC: 11.2 G/DL — LOW (ref 13–17)
LIPID PNL WITH DIRECT LDL SERPL: 125 MG/DL — SIGNIFICANT CHANGE UP
MAGNESIUM SERPL-MCNC: 1.7 MG/DL — SIGNIFICANT CHANGE UP (ref 1.6–2.6)
MCHC RBC-ENTMCNC: 23.7 PG — LOW (ref 27–34)
MCHC RBC-ENTMCNC: 31.6 GM/DL — LOW (ref 32–36)
MCV RBC AUTO: 75 FL — LOW (ref 80–100)
PHOSPHATE SERPL-MCNC: 2.1 MG/DL — LOW (ref 2.5–4.5)
PLATELET # BLD AUTO: 168 K/UL — SIGNIFICANT CHANGE UP (ref 150–400)
POTASSIUM SERPL-MCNC: 4.3 MMOL/L — SIGNIFICANT CHANGE UP (ref 3.5–5.3)
POTASSIUM SERPL-SCNC: 4.3 MMOL/L — SIGNIFICANT CHANGE UP (ref 3.5–5.3)
RBC # BLD: 4.71 M/UL — SIGNIFICANT CHANGE UP (ref 4.2–5.8)
RBC # FLD: 16.1 % — HIGH (ref 11–15)
SODIUM SERPL-SCNC: 144 MMOL/L — SIGNIFICANT CHANGE UP (ref 135–145)
TOTAL CHOLESTEROL/HDL RATIO MEASUREMENT: 5.9 RATIO — SIGNIFICANT CHANGE UP (ref 3.4–9.6)
TRIGL SERPL-MCNC: 190 MG/DL — HIGH (ref 10–149)
WBC # BLD: 4.9 K/UL — SIGNIFICANT CHANGE UP (ref 3.8–10.5)
WBC # FLD AUTO: 4.9 K/UL — SIGNIFICANT CHANGE UP (ref 3.8–10.5)

## 2017-06-28 PROCEDURE — 99238 HOSP IP/OBS DSCHRG MGMT 30/<: CPT

## 2017-06-28 RX ORDER — METFORMIN HYDROCHLORIDE 850 MG/1
1 TABLET ORAL
Qty: 60 | Refills: 0 | OUTPATIENT
Start: 2017-06-28 | End: 2017-07-28

## 2017-06-28 RX ORDER — KETOCONAZOLE 20 MG/G
1 AEROSOL, FOAM TOPICAL
Qty: 1 | Refills: 0 | OUTPATIENT
Start: 2017-06-28 | End: 2017-07-12

## 2017-06-28 RX ORDER — AMLODIPINE BESYLATE 2.5 MG/1
1 TABLET ORAL
Qty: 30 | Refills: 0 | OUTPATIENT
Start: 2017-06-28 | End: 2017-07-28

## 2017-06-28 RX ORDER — HYDROXYZINE HCL 10 MG
1 TABLET ORAL
Qty: 30 | Refills: 0 | OUTPATIENT
Start: 2017-06-28 | End: 2017-07-13

## 2017-06-28 RX ORDER — ASPIRIN/CALCIUM CARB/MAGNESIUM 324 MG
1 TABLET ORAL
Qty: 0 | Refills: 0 | COMMUNITY

## 2017-06-28 RX ORDER — ASPIRIN/CALCIUM CARB/MAGNESIUM 324 MG
1 TABLET ORAL
Qty: 0 | Refills: 0 | COMMUNITY
Start: 2017-06-28

## 2017-06-28 RX ORDER — METFORMIN HYDROCHLORIDE 850 MG/1
1 TABLET ORAL
Qty: 0 | Refills: 0 | COMMUNITY

## 2017-06-28 RX ORDER — ENOXAPARIN SODIUM 100 MG/ML
15 INJECTION SUBCUTANEOUS
Qty: 1 | Refills: 0 | OUTPATIENT
Start: 2017-06-28 | End: 2017-07-28

## 2017-06-28 RX ADMIN — Medication 2: at 12:12

## 2017-06-28 RX ADMIN — Medication 1: at 07:53

## 2017-06-28 RX ADMIN — KETOCONAZOLE 1 APPLICATION(S): 20 AEROSOL, FOAM TOPICAL at 05:33

## 2017-06-28 RX ADMIN — AMLODIPINE BESYLATE 5 MILLIGRAM(S): 2.5 TABLET ORAL at 05:31

## 2017-06-28 RX ADMIN — Medication 81 MILLIGRAM(S): at 12:13

## 2017-06-28 RX ADMIN — HEPARIN SODIUM 5000 UNIT(S): 5000 INJECTION INTRAVENOUS; SUBCUTANEOUS at 05:31

## 2017-06-28 RX ADMIN — INSULIN GLARGINE 15 UNIT(S): 100 INJECTION, SOLUTION SUBCUTANEOUS at 08:11

## 2017-06-28 RX ADMIN — PANTOPRAZOLE SODIUM 40 MILLIGRAM(S): 20 TABLET, DELAYED RELEASE ORAL at 05:30

## 2017-06-28 NOTE — DISCHARGE NOTE ADULT - SECONDARY DIAGNOSIS.
Type 2 diabetes mellitus without complication, with long-term current use of insulin Essential hypertension Hyperkalemia Rash

## 2017-06-28 NOTE — DIETITIAN INITIAL EVALUATION ADULT. - OTHER INFO
pt seen due to HgA1c > 7.0%. pt presents c good appetite and po intake. reports dx c dm ~10 yrs ago but doesn't follow a diet because hes too busy working (as a ) and picks up food a lot. he is taking lantus and metformin at home. he lives at home c wife and 2 children; he does the cooking (when he does cook). + bm daily.

## 2017-06-28 NOTE — DIETITIAN INITIAL EVALUATION ADULT. - ENERGY NEEDS
Height (cm): 180.34 (06-26)  Weight (kg): 85 (06-27)  BMI (kg/m2): 26.1 (06-27)  Ideal Body Weight: 78.2kg+/-10%  % Ideal Body Weight: 100%

## 2017-06-28 NOTE — DISCHARGE NOTE ADULT - MEDICATION SUMMARY - MEDICATIONS TO STOP TAKING
I will STOP taking the medications listed below when I get home from the hospital:    unknown diabetec pill  --    metFORMIN 500 mg oral tablet  -- 1 tab(s) by mouth 2 times a day

## 2017-06-28 NOTE — DISCHARGE NOTE ADULT - CARE PLAN
Principal Discharge DX:	Acute renal failure, unspecified acute renal failure type  Goal:	improve hydration  Instructions for follow-up, activity and diet:	dash ada diet  Secondary Diagnosis:	Type 2 diabetes mellitus without complication, with long-term current use of insulin  Goal:	tight glucose control  Secondary Diagnosis:	Essential hypertension  Goal:	resume blood pressure meds  Secondary Diagnosis:	Hyperkalemia  Goal:	resolved  Secondary Diagnosis:	Rash

## 2017-06-28 NOTE — DIETITIAN INITIAL EVALUATION ADULT. - PERTINENT LABORATORY DATA
06-28 Na144 mmol/L Glu 169 mg/dL<H> K+ 4.3 mmol/L Cr  1.57 mg/dL<H> BUN 19 mg/dL Phos 2.1 mg/dL<L> Alb n/a   PAB n/a06-27 [id number] 9.7

## 2017-06-28 NOTE — DISCHARGE NOTE ADULT - MEDICATION SUMMARY - MEDICATIONS TO TAKE
I will START or STAY ON the medications listed below when I get home from the hospital:    aspirin 81 mg oral delayed release tablet  -- 1 tab(s) by mouth once a day  -- Indication: For Htn    Lantus Solostar Pen 100 units/mL subcutaneous solution  -- 15 unit(s) subcutaneous 2 times a day   once in morning and once in evening  -- Do not drink alcoholic beverages when taking this medication.  It is very important that you take or use this exactly as directed.  Do not skip doses or discontinue unless directed by your doctor.  Keep in refrigerator.  Do not freeze.    -- Indication: For dm    Hyzaar 50 mg-12.5 mg oral tablet  -- 1 tab(s) by mouth once a day  -- Indication: For Htn    hydrOXYzine hydrochloride 10 mg oral tablet  -- 1 tab(s) by mouth 2 times a day, As needed, Itching  -- Indication: For itching    amLODIPine 10 mg oral tablet  -- 1 tab(s) by mouth once a day  -- Indication: For Htn    ketoconazole 2% topical cream  -- 1 application on skin 2 times a day to affected area  -- Indication: For Rash

## 2017-06-28 NOTE — DISCHARGE NOTE ADULT - PATIENT PORTAL LINK FT
“You can access the [facility] Patient Portal, offered by [facility], by registering with the following website: [email or web address]”

## 2017-06-28 NOTE — DISCHARGE NOTE ADULT - HOSPITAL COURSE
· Assessment	  55 y/o diabetic hypertensive man, presents with hyperkalemia, hyperglycemia and JORGITO. Recently stated on prednisone for itching and rash.  Most likely uncontrolled diabetes due to steroids, with prerenal azotemia due to dehydration and secondary hyperkalemia Pt treated in ED with albuterol, calcium,  insulin and kayexelate, with saline bolus . hyperkalemia resolved ARF is improving     Problem/Plan - 1:  ·  Problem: JORGITO (acute kidney injury).  Plan: most likely prerenal, possibly exacerbated by ARB with diuretic. it is improving ..   IV fluids, follow kidney function.    recommended to patient have pcp check labds on next week. i decrease dose of losartan from 100 to 50     Problem/Plan - 2:  ·  Problem: Type 2 diabetes mellitus with diabetic autonomic neuropathy, without long-term current use of insulin.  Plan: lantus, insulin coverage    may need insulin long term was only on oral agents as outpt. stop metformin    Problem/Plan - 3:  ·  Problem: Essential hypertension.  Plan: BP stable can restart meds. will continue with Norvasc, decreased ARB to 50mg  from 100mg    Problem/Plan - 4:  ·  Problem: Hyperkalemia.  Plan: resolved.     Problem/Plan - 5:  ·  Problem: Rash.  Plan: felt due to tinea versicolor will start ketoconazole. advised pt to f/u with dermatology . and have tight blood sugar control.. · Assessment	  57 y/o diabetic hypertensive man, presents with hyperkalemia, hyperglycemia and JORGITO. Recently stated on prednisone for itching and rash.  Most likely uncontrolled diabetes due to steroids, with prerenal azotemia due to dehydration and secondary hyperkalemia Pt treated in ED with albuterol, calcium,  insulin and kayexelate, with saline bolus . hyperkalemia resolved ARF is improving     Problem/Plan - 1:  ·  Problem: JORGITO (acute kidney injury).  Plan: most likely prerenal, possibly exacerbated by ARB with diuretic. it is improving ..   , follow kidney function.    recommended to patient have pcp check labs on next week. i decreased dose of losartan from 100 to 50     Problem/Plan - 2:  ·  Problem: Type 2 diabetes mellitus with diabetic autonomic neuropathy, without long-term current use of insulin.  Plan: lantus, insulin coverage    may need insulin long term was only on oral agents as outpt. stop metformin    Problem/Plan - 3:  ·  Problem: Essential hypertension.  Plan: BP stable can restart meds. will continue with Norvasc, decreased ARB to 50mg  from 100mg    Problem/Plan - 4:  ·  Problem: Hyperkalemia.  Plan: resolved.     Problem/Plan - 5:  ·  Problem: Rash.  Plan: felt due to tinea versicolor will start ketoconazole. advised pt to f/u with dermatology . and have tight blood sugar control..

## 2017-06-30 DIAGNOSIS — T38.0X5A ADVERSE EFFECT OF GLUCOCORTICOIDS AND SYNTHETIC ANALOGUES, INITIAL ENCOUNTER: ICD-10-CM

## 2017-06-30 DIAGNOSIS — E11.65 TYPE 2 DIABETES MELLITUS WITH HYPERGLYCEMIA: ICD-10-CM

## 2017-06-30 DIAGNOSIS — N17.9 ACUTE KIDNEY FAILURE, UNSPECIFIED: ICD-10-CM

## 2017-06-30 DIAGNOSIS — Z79.82 LONG TERM (CURRENT) USE OF ASPIRIN: ICD-10-CM

## 2017-06-30 DIAGNOSIS — E87.5 HYPERKALEMIA: ICD-10-CM

## 2017-06-30 DIAGNOSIS — E11.43 TYPE 2 DIABETES MELLITUS WITH DIABETIC AUTONOMIC (POLY)NEUROPATHY: ICD-10-CM

## 2017-06-30 DIAGNOSIS — E86.0 DEHYDRATION: ICD-10-CM

## 2017-06-30 DIAGNOSIS — B36.0 PITYRIASIS VERSICOLOR: ICD-10-CM

## 2017-06-30 DIAGNOSIS — Z88.0 ALLERGY STATUS TO PENICILLIN: ICD-10-CM

## 2017-06-30 DIAGNOSIS — I10 ESSENTIAL (PRIMARY) HYPERTENSION: ICD-10-CM

## 2017-06-30 DIAGNOSIS — Z79.4 LONG TERM (CURRENT) USE OF INSULIN: ICD-10-CM

## 2018-02-14 ENCOUNTER — INPATIENT (INPATIENT)
Facility: HOSPITAL | Age: 58
LOS: 4 days | Discharge: HOME | End: 2018-02-19
Attending: INTERNAL MEDICINE | Admitting: INTERNAL MEDICINE
Payer: COMMERCIAL

## 2018-02-14 VITALS
TEMPERATURE: 98 F | RESPIRATION RATE: 16 BRPM | OXYGEN SATURATION: 100 % | DIASTOLIC BLOOD PRESSURE: 76 MMHG | SYSTOLIC BLOOD PRESSURE: 153 MMHG | HEART RATE: 96 BPM

## 2018-02-14 LAB
ALBUMIN SERPL ELPH-MCNC: 4.1 G/DL — SIGNIFICANT CHANGE UP (ref 3.3–5)
ALP SERPL-CCNC: 67 U/L — SIGNIFICANT CHANGE UP (ref 40–120)
ALT FLD-CCNC: 21 U/L — SIGNIFICANT CHANGE UP (ref 4–41)
AST SERPL-CCNC: 20 U/L — SIGNIFICANT CHANGE UP (ref 4–40)
BASE EXCESS BLDV CALC-SCNC: -0.8 MMOL/L — SIGNIFICANT CHANGE UP
BILIRUB SERPL-MCNC: 0.2 MG/DL — SIGNIFICANT CHANGE UP (ref 0.2–1.2)
BLOOD GAS VENOUS - CREATININE: 1.95 MG/DL — HIGH (ref 0.5–1.3)
BUN SERPL-MCNC: 32 MG/DL — HIGH (ref 7–23)
CALCIUM SERPL-MCNC: 8.5 MG/DL — SIGNIFICANT CHANGE UP (ref 8.4–10.5)
CHLORIDE BLDV-SCNC: 105 MMOL/L — SIGNIFICANT CHANGE UP (ref 96–108)
CHLORIDE SERPL-SCNC: 100 MMOL/L — SIGNIFICANT CHANGE UP (ref 98–107)
CO2 SERPL-SCNC: 22 MMOL/L — SIGNIFICANT CHANGE UP (ref 22–31)
CREAT SERPL-MCNC: 2.01 MG/DL — HIGH (ref 0.5–1.3)
GAS PNL BLDV: 133 MMOL/L — LOW (ref 136–146)
GLUCOSE BLDV-MCNC: 267 — HIGH (ref 70–99)
GLUCOSE SERPL-MCNC: 240 MG/DL — HIGH (ref 70–99)
HBA1C BLD-MCNC: 6.8 % — HIGH (ref 4–5.6)
HCO3 BLDV-SCNC: 24 MMOL/L — SIGNIFICANT CHANGE UP (ref 20–27)
HCT VFR BLD CALC: 36.6 % — LOW (ref 39–50)
HCT VFR BLDV CALC: 36 % — LOW (ref 39–51)
HGB BLD-MCNC: 11.6 G/DL — LOW (ref 13–17)
HGB BLDV-MCNC: 11.7 G/DL — LOW (ref 13–17)
LACTATE BLDV-MCNC: 1.3 MMOL/L — SIGNIFICANT CHANGE UP (ref 0.5–2)
MCHC RBC-ENTMCNC: 24.1 PG — LOW (ref 27–34)
MCHC RBC-ENTMCNC: 31.7 % — LOW (ref 32–36)
MCV RBC AUTO: 76.1 FL — LOW (ref 80–100)
NRBC # FLD: 0 — SIGNIFICANT CHANGE UP
PCO2 BLDV: 42 MMHG — SIGNIFICANT CHANGE UP (ref 41–51)
PH BLDV: 7.37 PH — SIGNIFICANT CHANGE UP (ref 7.32–7.43)
PLATELET # BLD AUTO: 247 K/UL — SIGNIFICANT CHANGE UP (ref 150–400)
PMV BLD: 10.7 FL — SIGNIFICANT CHANGE UP (ref 7–13)
PO2 BLDV: 65 MMHG — HIGH (ref 35–40)
POTASSIUM BLDV-SCNC: 4.4 MMOL/L — SIGNIFICANT CHANGE UP (ref 3.4–4.5)
POTASSIUM SERPL-MCNC: 4.6 MMOL/L — SIGNIFICANT CHANGE UP (ref 3.5–5.3)
POTASSIUM SERPL-SCNC: 4.6 MMOL/L — SIGNIFICANT CHANGE UP (ref 3.5–5.3)
PROT SERPL-MCNC: 6.8 G/DL — SIGNIFICANT CHANGE UP (ref 6–8.3)
RBC # BLD: 4.81 M/UL — SIGNIFICANT CHANGE UP (ref 4.2–5.8)
RBC # FLD: 16.7 % — HIGH (ref 10.3–14.5)
SAO2 % BLDV: 91.5 % — HIGH (ref 60–85)
SODIUM SERPL-SCNC: 136 MMOL/L — SIGNIFICANT CHANGE UP (ref 135–145)
WBC # BLD: 7.9 K/UL — SIGNIFICANT CHANGE UP (ref 3.8–10.5)
WBC # FLD AUTO: 7.9 K/UL — SIGNIFICANT CHANGE UP (ref 3.8–10.5)

## 2018-02-14 NOTE — ED ADULT TRIAGE NOTE - CHIEF COMPLAINT QUOTE
pt comes to ED by EMS for hypoglycemia pt started a new diet at home his wife did his FS was 19 he received juice FS was 50. pt got oral glucose by EMS. pt FS in triage is 161. pt is a&0x3 at this time VSS pt comes to ED by EMS for hypoglycemia pt started a new diet at home his wife did his FS was 19 he received juice FS was 50. pt got oral glucose by EMS. pt FS in triage is 161. pt is a&0x3 at this time VSS rpt

## 2018-02-14 NOTE — ED ADULT NURSE NOTE - ED STAT RN HANDOFF DETAILS
handoff given to RN in CDU- pt in NAD, ambulatory, a/ox3, awake, family at bedside, all belongings brought to CDU by wife

## 2018-02-14 NOTE — ED ADULT NURSE NOTE - OBJECTIVE STATEMENT
Marisol RN: Patient received in room #28 c/o hypoglycemia. Patient A&OX3, reports he has not been eating the past few days, only had breakfast this morning and took his Lantus 15 units day and night  as well as metformin. Patient reports feeling confused, FS @ home was 20. Patient denies any LOC. Patient denies any pain currently. VS as noted. 20G IV Placed in left ac, labs drawn and sent. Will monitor. Report given to primary RN Jeniffer.

## 2018-02-14 NOTE — ED PROVIDER NOTE - ATTENDING CONTRIBUTION TO CARE
DR. BLOCH, ATTENDING MD-  I performed a face to face bedside interview with patient regarding history of present illness, review of symptoms and past medical history. I completed an independent physical exam.  I have discussed patient's plan of care with the resident.  Patient well appearing NAD alert and oriented, HEENT nml Lungs clear, abd soft nontender, ext no edema. neuro nonfocal. DR. BLOCH, ATTENDING MD-  I performed a face to face bedside interview with patient regarding history of present illness, review of symptoms and past medical history. I completed an independent physical exam.  I have discussed patient's plan of care with the resident.  Patient well appearing NAD alert and oriented, HEENT nml Lungs clear, abd soft nontender, ext no edema. neuro nonfocal., taking metformin though low gfr, Hx of mild elevated cr.

## 2018-02-14 NOTE — ED PROVIDER NOTE - OBJECTIVE STATEMENT
Resident: 57y M PMH DM on metformin 500mg bid, lantus 30U bid presents with hypoglycemia. Patient reports he ate only a small breakfast this morning. Reports vision loss in right eye for past few years 2/2 "blood clot," was told to control blood sugar, no other intervention. Patient is a . Resident: 57y M PMH DM on metformin 500mg bid, lantus 30U bid presents with hypoglycemia. Patient reports he ate only a small breakfast this morning, did not eat anything else today. After arriving home patient became confused, weak, lightheaded. FSG at home was 20. Called EMS, received oral glucose en route. Reports vision loss in right eye for past few years 2/2 "blood clot," was told to control blood sugar, no other intervention. Patient is a .

## 2018-02-14 NOTE — ED PROVIDER NOTE - PROGRESS NOTE DETAILS
Resident: patient eating, . CMP pending. Patient has endocrinologist, will  on importance of eating regular meals and follow-up with endo. Resident: patient eating, . CMP pending. Resident: EKG with inverted T-waves. Will add on cardiac enzymes. Resident: first set cardiac enzymes negative. Will admit to CDU for endo consult, serial troponin, and stress test.

## 2018-02-14 NOTE — ED ADULT NURSE NOTE - CHIEF COMPLAINT QUOTE
pt comes to ED by EMS for hypoglycemia pt started a new diet at home his wife did his FS was 19 he received juice FS was 50. pt got oral glucose by EMS. pt FS in triage is 161. pt is a&0x3 at this time VSS rpt

## 2018-02-15 DIAGNOSIS — N17.9 ACUTE KIDNEY FAILURE, UNSPECIFIED: ICD-10-CM

## 2018-02-15 DIAGNOSIS — I24.9 ACUTE ISCHEMIC HEART DISEASE, UNSPECIFIED: ICD-10-CM

## 2018-02-15 DIAGNOSIS — E11.649 TYPE 2 DIABETES MELLITUS WITH HYPOGLYCEMIA WITHOUT COMA: ICD-10-CM

## 2018-02-15 DIAGNOSIS — Z29.9 ENCOUNTER FOR PROPHYLACTIC MEASURES, UNSPECIFIED: ICD-10-CM

## 2018-02-15 DIAGNOSIS — E11.9 TYPE 2 DIABETES MELLITUS WITHOUT COMPLICATIONS: ICD-10-CM

## 2018-02-15 DIAGNOSIS — E16.2 HYPOGLYCEMIA, UNSPECIFIED: ICD-10-CM

## 2018-02-15 DIAGNOSIS — I10 ESSENTIAL (PRIMARY) HYPERTENSION: ICD-10-CM

## 2018-02-15 LAB
CK MB BLD-MCNC: 3.4 — HIGH (ref 0–2.5)
CK MB BLD-MCNC: 6.16 NG/ML — SIGNIFICANT CHANGE UP (ref 1–6.6)
CK MB BLD-MCNC: 7.04 NG/ML — HIGH (ref 1–6.6)
CK SERPL-CCNC: 183 U/L — SIGNIFICANT CHANGE UP (ref 30–200)
CK SERPL-CCNC: 210 U/L — HIGH (ref 30–200)
TROPONIN T SERPL-MCNC: < 0.06 NG/ML — SIGNIFICANT CHANGE UP (ref 0–0.06)
TROPONIN T SERPL-MCNC: < 0.06 NG/ML — SIGNIFICANT CHANGE UP (ref 0–0.06)
TSH SERPL-MCNC: 1.01 UIU/ML — SIGNIFICANT CHANGE UP (ref 0.27–4.2)

## 2018-02-15 PROCEDURE — 93018 CV STRESS TEST I&R ONLY: CPT | Mod: GC

## 2018-02-15 PROCEDURE — 78452 HT MUSCLE IMAGE SPECT MULT: CPT | Mod: 26

## 2018-02-15 PROCEDURE — 93016 CV STRESS TEST SUPVJ ONLY: CPT | Mod: GC

## 2018-02-15 RX ORDER — INSULIN LISPRO 100/ML
VIAL (ML) SUBCUTANEOUS
Qty: 0 | Refills: 0 | Status: DISCONTINUED | OUTPATIENT
Start: 2018-02-15 | End: 2018-02-19

## 2018-02-15 RX ORDER — ASPIRIN/CALCIUM CARB/MAGNESIUM 324 MG
81 TABLET ORAL DAILY
Qty: 0 | Refills: 0 | Status: DISCONTINUED | OUTPATIENT
Start: 2018-02-15 | End: 2018-02-19

## 2018-02-15 RX ORDER — SODIUM CHLORIDE 9 MG/ML
3 INJECTION INTRAMUSCULAR; INTRAVENOUS; SUBCUTANEOUS EVERY 8 HOURS
Qty: 0 | Refills: 0 | Status: DISCONTINUED | OUTPATIENT
Start: 2018-02-15 | End: 2018-02-19

## 2018-02-15 RX ORDER — METOPROLOL TARTRATE 50 MG
12.5 TABLET ORAL
Qty: 0 | Refills: 0 | Status: DISCONTINUED | OUTPATIENT
Start: 2018-02-15 | End: 2018-02-17

## 2018-02-15 RX ORDER — HEPARIN SODIUM 5000 [USP'U]/ML
5000 INJECTION INTRAVENOUS; SUBCUTANEOUS EVERY 12 HOURS
Qty: 0 | Refills: 0 | Status: DISCONTINUED | OUTPATIENT
Start: 2018-02-15 | End: 2018-02-19

## 2018-02-15 RX ORDER — ASPIRIN/CALCIUM CARB/MAGNESIUM 324 MG
162 TABLET ORAL ONCE
Qty: 0 | Refills: 0 | Status: COMPLETED | OUTPATIENT
Start: 2018-02-15 | End: 2018-02-15

## 2018-02-15 RX ORDER — LOSARTAN POTASSIUM 100 MG/1
25 TABLET, FILM COATED ORAL DAILY
Qty: 0 | Refills: 0 | Status: DISCONTINUED | OUTPATIENT
Start: 2018-02-15 | End: 2018-02-15

## 2018-02-15 RX ORDER — FAMOTIDINE 10 MG/ML
20 INJECTION INTRAVENOUS DAILY
Qty: 0 | Refills: 0 | Status: DISCONTINUED | OUTPATIENT
Start: 2018-02-15 | End: 2018-02-19

## 2018-02-15 RX ADMIN — Medication 12.5 MILLIGRAM(S): at 23:26

## 2018-02-15 RX ADMIN — LOSARTAN POTASSIUM 25 MILLIGRAM(S): 100 TABLET, FILM COATED ORAL at 12:35

## 2018-02-15 RX ADMIN — Medication 2: at 13:49

## 2018-02-15 RX ADMIN — Medication 162 MILLIGRAM(S): at 04:27

## 2018-02-15 RX ADMIN — FAMOTIDINE 20 MILLIGRAM(S): 10 INJECTION INTRAVENOUS at 04:28

## 2018-02-15 RX ADMIN — Medication 1: at 18:20

## 2018-02-15 RX ADMIN — SODIUM CHLORIDE 3 MILLILITER(S): 9 INJECTION INTRAMUSCULAR; INTRAVENOUS; SUBCUTANEOUS at 23:30

## 2018-02-15 RX ADMIN — HEPARIN SODIUM 5000 UNIT(S): 5000 INJECTION INTRAVENOUS; SUBCUTANEOUS at 23:26

## 2018-02-15 NOTE — H&P ADULT - ATTENDING COMMENTS
57 year old man with HTN and controlled IDDM presents with altered mental status    #Angina- patient underwent stress test ordered by CDU.  Result suggest ischemia and transient ischemic dilatation concerning for mulitvessel CAD.  Plan for cardiac cath today.  EKG reveals Sinus; LAFB, no ischemic changes.  Continue ASA.    #Controlled IDDM-   Continue home dose Lantus.    #HTN- Controlled on current regimen.

## 2018-02-15 NOTE — CONSULT NOTE ADULT - PROBLEM SELECTOR RECOMMENDATION 2
Patient with h/o Typer 2 DM is on Lantus BID and metformin at home.   Continue to hold PO medication, monitor Accu-Cheks and continue sliding scale to measure insulin requirement.   Since patient has JORGITO, advice to discontinue metformin at this point on discharge. Patient should have follow up BMP in PCP office to assess JORGITO is improving or not.   Patient agrees to take Lantus and Humalog 4 shots in a day; will likely plan discharge on weight based Insulin.   Goal -180 while inpatient.   Goal HbA1c <7, given no hypoglycemic episodes. If patient continues to have hypoglycemia, can plan higher HbA1c. Patient with h/o Typer 2 DM is on Lantus BID and metformin at home.   Continue to hold PO medication, monitor Accu-Cheks and continue sliding scale to measure insulin requirement.   Since patient has JORGITO, advice to discontinue metformin at this point on discharge. Patient should have follow up BMP in PCP office to assess JORGTIO is improving or not.   Patient agrees to take Lantus and Humalog 4 shots in a day; will likely plan discharge on weight based Insulin.   Goal -180 while inpatient.   Goal HbA1c <7, given no hypoglycemic episodes. If patient continues to have hypoglycemia, can plan higher HbA1c.  Plan discharge on Lantus 24 U once a day ad Humalog 6 U before meal, provided patient should not Humalog if he miss a meal. Patient with h/o Typer 2 DM is on Lantus BID and metformin at home.   Continue to hold PO medication, monitor Accu-Cheks and continue sliding scale to measure insulin requirement.   Since patient has JORGITO, advise to discontinue metformin at this point on discharge. Patient should have follow up BMP in PCP office to assess JORGITO is improving or not.   Patient agrees to take Lantus and Humalog 4 shots in a day; will likely plan discharge on weight based Insulin.   Goal -180 while inpatient.   Goal HbA1c <7, given no hypoglycemic episodes. If patient continues to have hypoglycemia, can plan higher HbA1c.  Plan discharge on Lantus 24 U once a day ad Humalog 6 U before meal, provided patient should not Humalog if he miss a meal.

## 2018-02-15 NOTE — ED CDU PROVIDER INITIAL DAY NOTE - MEDICAL DECISION MAKING DETAILS
56 yo M, former smoker, with PMH of insulin dependent DMII, HTN, HLD, h/o cardiac cath w/o stents 8 years ago, BIBA to ER c/o hypoglycemia associate with confusion, weakness and lightheadedness, FS of 20 at home.   -In ER, , CE1 neg, EKG w/ inverted T wave, sent to CDU for endocrine consult in the morning, serial CE, and stress test.

## 2018-02-15 NOTE — CONSULT NOTE ADULT - SUBJECTIVE AND OBJECTIVE BOX
Patient is a 57y old  Male with PMH of Htn, HLD, Dm type 2 diagnosed 12 years ago and CAD s/p cardiac stents presented to the ED because of low FG of 20 at home, associated with weakness, lethargy and confusion. Patient usually takes Lantus 15U twice a day (last use was yesterday morning) and Metformin 500 BID. Patient admits to have poor appetite. Patient als complains of blurry vision in the R eye. He usually check his FG at home and it ranges from 50-70 to 150-200's.   Endocrine department was consulted for h/o of DM and low blood sugar, likely needs readjustment in medication regimen.     PAST MEDICAL & SURGICAL HISTORY:  Diabetes  Unspecified essential hypertension  Type II or unspecified type diabetes mellitus without mention of complication, not stated as uncontr  S/P cardiac catheterization: 2010 (stent placement?)         MEDICATIONS  (STANDING):  famotidine    Tablet 20 milliGRAM(s) Oral daily  losartan 25 milliGRAM(s) Oral daily    MEDICATIONS  (PRN):      FAMILY HISTORY:  No pertinent family history in first degree relatives      SOCIAL HISTORY: Smoker, non alcoholic, denies illicit drug use.           Vital Signs Last 24 Hrs  T(C): 36.7 (15 Feb 2018 06:00), Max: 36.7 (15 Feb 2018 03:58)  T(F): 98 (15 Feb 2018 06:00), Max: 98.1 (15 Feb 2018 03:58)  HR: 79 (15 Feb 2018 06:00) (79 - 99)  BP: 110/72 (15 Feb 2018 06:00) (110/62 - 175/86)  BP(mean): 74 (15 Feb 2018 01:13) (74 - 74)  RR: 16 (15 Feb 2018 06:00) (16 - 18)  SpO2: 98% (15 Feb 2018 06:00) (98% - 100%)            LABS:                        11.6   7.90  )-----------( 247      ( 14 Feb 2018 22:56 )             36.6     02-14    136  |  100  |  32<H>  ----------------------------<  240<H>  4.6   |  22  |  2.01<H>    Ca    8.5      14 Feb 2018 22:56    TPro  6.8  /  Alb  4.1  /  TBili  0.2  /  DBili  x   /  AST  20  /  ALT  21  /  AlkPhos  67  02-14    CARDIAC MARKERS ( 15 Feb 2018 04:32 )  x     / < 0.06 ng/mL / 183 u/L / 6.16 ng/mL / x      CARDIAC MARKERS ( 14 Feb 2018 22:56 )  x     / < 0.06 ng/mL / 210 u/L / 7.04 ng/mL / x              CAPILLARY BLOOD GLUCOSE      POCT Blood Glucose.: 99 mg/dL (15 Feb 2018 05:57)  POCT Blood Glucose.: 142 mg/dL (15 Feb 2018 03:07)  POCT Blood Glucose.: 238 mg/dL (14 Feb 2018 21:47)  POCT Blood Glucose.: 161 mg/dL (14 Feb 2018 20:17)      RADIOLOGY & ADDITIONAL STUDIES: Patient is a 57y old  Male with PMH of Htn, HLD, Dm type 2 diagnosed 12 years ago and CAD s/p cardiac stents presented to the ED because of low FG of 20 at home, associated with weakness, lethargy and confusion. Patient usually takes Lantus 15U twice a day (last use was yesterday morning) and Metformin 500 BID. Patient admits to have poor appetite. Patient also complains of blurry vision in the R eye. He usually check his FG at home and it ranges from 50-70 to 150-200's.  He took his AM lantus yesterday, then got low BG later in day was found to be in 20s and brought in.  Follows with PCP.  Is not on huamlog.  GFR currently low as well with JORGITO.   Endocrine department was consulted for h/o of DM and low blood sugar, likely needs readjustment in medication regimen.     PAST MEDICAL & SURGICAL HISTORY:  Diabetes  Unspecified essential hypertension  Type II or unspecified type diabetes mellitus without mention of complication, not stated as uncontr  S/P cardiac catheterization: 2010 (stent placement?)         MEDICATIONS  (STANDING):  famotidine    Tablet 20 milliGRAM(s) Oral daily  losartan 25 milliGRAM(s) Oral daily    MEDICATIONS  (PRN):      FAMILY HISTORY:  No pertinent family history in first degree relatives      SOCIAL HISTORY: Smoker, non alcoholic, denies illicit drug use.           Vital Signs Last 24 Hrs  T(C): 36.7 (15 Feb 2018 06:00), Max: 36.7 (15 Feb 2018 03:58)  T(F): 98 (15 Feb 2018 06:00), Max: 98.1 (15 Feb 2018 03:58)  HR: 79 (15 Feb 2018 06:00) (79 - 99)  BP: 110/72 (15 Feb 2018 06:00) (110/62 - 175/86)  BP(mean): 74 (15 Feb 2018 01:13) (74 - 74)  RR: 16 (15 Feb 2018 06:00) (16 - 18)  SpO2: 98% (15 Feb 2018 06:00) (98% - 100%)            LABS:                        11.6   7.90  )-----------( 247      ( 14 Feb 2018 22:56 )             36.6     02-14    136  |  100  |  32<H>  ----------------------------<  240<H>  4.6   |  22  |  2.01<H>    Ca    8.5      14 Feb 2018 22:56    TPro  6.8  /  Alb  4.1  /  TBili  0.2  /  DBili  x   /  AST  20  /  ALT  21  /  AlkPhos  67  02-14    CARDIAC MARKERS ( 15 Feb 2018 04:32 )  x     / < 0.06 ng/mL / 183 u/L / 6.16 ng/mL / x      CARDIAC MARKERS ( 14 Feb 2018 22:56 )  x     / < 0.06 ng/mL / 210 u/L / 7.04 ng/mL / x              CAPILLARY BLOOD GLUCOSE      POCT Blood Glucose.: 99 mg/dL (15 Feb 2018 05:57)  POCT Blood Glucose.: 142 mg/dL (15 Feb 2018 03:07)  POCT Blood Glucose.: 238 mg/dL (14 Feb 2018 21:47)  POCT Blood Glucose.: 161 mg/dL (14 Feb 2018 20:17)      RADIOLOGY & ADDITIONAL STUDIES:

## 2018-02-15 NOTE — ED CDU PROVIDER INITIAL DAY NOTE - OBJECTIVE STATEMENT
57y M PMH DM on metformin 500mg bid, lantus 30U bid presents with hypoglycemia. Patient reports he ate only a small breakfast this morning, did not eat anything else today. After arriving home patient became confused, weak, lightheaded. FSG at home was 20. Called EMS, received oral glucose en route. Reports vision loss in right eye for past few years 2/2 "blood clot," was told to control blood sugar, no other intervention. Patient is a .      --PA Marguerite: 58 yo M, former smoker, with PMH of insulin dependent DMII, HTN, HLD, h/o cardiac cath w/o stents 8 years ago, BIBA to ER c/o hypoglycemia associate with confusion, weakness and lightheadedness, FS of 20 at home. Pt states he's been eating less, only eat small breakfast in the morning, took his diabetic meds and then went to work without eating anything in between. As per wife, patient came home in the evening, became confused, took his blood sugar, 20 and gave patient some juice and then called EMS. Admits taking Lantus 30Unit bid, and metformin 500mg bid. Reports having cardiac cath in the past for SOB/dyspnea on exertion. Admits having dyspnea on exertional for the past 3 months, no chest pain. States he hasn't seen a cardiologist for years, only follow up with PCP. Admits bad vision R>L for years due to his diabetes. Denies any fever, chills, n/v, chest pain, dizziness, syncope, visual changes, dysuria, leg pain, calf tenderness, recent travel, or any other complaints.   In the ER, currently asymptomatic, CE1 neg, EKG with inverted T waves, sent to CDU for endo consult, serial CE, and stress test.

## 2018-02-15 NOTE — H&P ADULT - RS GEN PE MLT RESP DETAILS PC
cyanotic/normal for race
respirations non-labored/airway patent/breath sounds equal/good air movement/no intercostal retractions/no chest wall tenderness/no rhonchi/clear to auscultation bilaterally/no wheezes/no rales/no subcutaneous emphysema

## 2018-02-15 NOTE — H&P ADULT - PROBLEM SELECTOR PLAN 2
FS QID  HISS  DM Diet.  Endocrine consult appreciated = Goal -180 while inpatient. Plan discharge on Lantus 24 U once a day ad Humalog 6 U before meal, provided patient should not Humalog if he miss a meal.

## 2018-02-15 NOTE — ED CDU PROVIDER DISPOSITION NOTE - SECONDARY DIAGNOSIS.
Hypoglycemia Type 2 diabetes mellitus with hypoglycemia without coma, with long-term current use of insulin

## 2018-02-15 NOTE — H&P ADULT - HISTORY OF PRESENT ILLNESS
57m with a history of insulin dependent DMII, HTN, Dyslipidemia, CAD with stents 8 years ago, experiencing hypoglycemia associate with confusion, weakness and lightheadedness, FS of 20 at home. Pt states he's been eating less, only eat small breakfast in the morning, took his diabetic meds and then went to work without eating anything in between. As per wife, the patient came home in the evening, became confused, took his blood sugar, 20 and gave patient some juice and then called EMS. Admits taking Lantus 30Unit bid, and metformin 500mg bid. Reports having cardiac cath in the past for SOB, dyspnea on exertion. Admits having dyspnea on exertional for the past 3 months, no chest pain. States he hasn't seen a cardiologist for years, only follow up with PCP. Denies any fever, chills, nausea, vomit, chest pain, dizziness, syncope, visual changes, dysuria, leg pain, calf tenderness, recent travel, or any other complaints.     In the Ed, the pt had a Stress test = :Abnormal Study  * Myocardial Perfusion SPECT results are abnormal.  * Review of raw data shows: The study is of adequate technical quality  * The left ventricle was enlarged. There are medium sized, moderate defects in inferior and inferolateral walls that are reversible, suggestive of ischemia. Transient ischemic dilation of the LV was noted with a ratio of: 1.2  * Rest gated wall motion analysis was performed (LVEF = 44 %;LVEDV = 160 ml.), revealing moderately reduced  LV function.  Post-stress gated wall motion analysis was performed (LVEF = 47 %;LVEDV = 197 ml.), revealing mild overall hypokinesis. There was severe inferior and inferolateral hypokinesis with moderately diminished systolic thickening. The remaining segments appeared mildly hypocontractile RV function appeared normal.    Seen by Endocrine. Their consult and recommendations are in chart.

## 2018-02-15 NOTE — CONSULT NOTE ADULT - ATTENDING COMMENTS
Agree with above plan by Dr. Grant.  HOLD metformin at d/c given renal function.  F/u with PMD for ongoing creatinine monitoring.  Would switch from 15 bid lantus at home to 24 units ONCE a day at d/c.  Would also write script for humalog pens (or novolog) take 6 units tidac.  May need refill of BD stefan pen needles as well. Agree with above plan by Dr. Grant.  HOLD metformin at d/c given renal function.  F/u with PMD for ongoing creatinine monitoring.  Would switch from 15 bid lantus at home to 24 units ONCE a day at d/c.  Would also write script for humalog pens (or novolog) take 6 units tidac.  May need refill of BD stefan pen needles as well.    ADDENDUM: on further discussion with pt, having frequent recent low BG to 50-60s over past several weeks, all times of day and night with reduced po intake.  Therefore would HOLD on sending in Rx for humalog.  Would simply stop metformin (given GFR) and consolidate/reduce to 24 glargine once daily on discharge with PMD f/u.

## 2018-02-15 NOTE — ED CDU PROVIDER DISPOSITION NOTE - ATTENDING CONTRIBUTION TO CARE
admitted to CDU for workup of chest pain and for endo consult given hypoglycemia.  stress test positive and showed reduced ef. admitted to tele for cath

## 2018-02-15 NOTE — H&P ADULT - GASTROINTESTINAL DETAILS
no guarding/soft/nontender/no rigidity/no organomegaly/no distention/no masses palpable/bowel sounds normal/no bruit/no rebound tenderness

## 2018-02-15 NOTE — ED CDU PROVIDER INITIAL DAY NOTE - PROGRESS NOTE DETAILS
Pt c/o heart burn in the neck area, ordered Pepcid,  mg. Pending CE2 & EKG at 5AM. Pending Stress test and Endo consult in the morning. Will continue to monitor overnight. Patient  states he is feeling better. Blurry vision complaint has improved. Exam: heart- RRR s1s2 nl Lungs - clear bilaterally abd - soft NT ND extrem - no edema or cyanosis  neuro - strength equal.  Cardiac enzymes #1  210  #2  183 .  Plan for stress test today. Endocrine called for consult. They will be in to see patient. Continue to monitor on telemetry. Stress test completed - medium size moderate defects inferior and inferlateral walls that are reversible suggesting ischemia. Plan to admit for cardiac cath

## 2018-02-15 NOTE — H&P ADULT - NSHPLABSRESULTS_GEN_ALL_CORE
NST-:Abnormal Study  * Myocardial Perfusion SPECT results are abnormal.  * Review of raw data shows: The study is of adequate technical quality  * The left ventricle was enlarged. There are medium sized, moderate defects in inferior and inferolateral walls that are reversible, suggestive of ischemia.Transient ischemic dilation of the LV was noted with a ratio of: 1.2  * Rest gated wall motion analysis was performed (LVEF = 44 %;LVEDV = 160 ml.), revealing moderately reduced  LV function.  Post-stress gated wall motion analysis was performed (LVEF = 47 %;LVEDV = 197 ml.), revealing mild overall hypokinesis. There was severe inferior and inferolateral hypokinesis with moderately diminished systolic thickening. The remaining segments appeared mildly hypcontracitile. RV function appeared normal.    NSR 79b/ min, LAE, LAFB, TWI = 1, AVL, V5 V6, QT/ QTC= 404/ 463  CE negative x 1  H & H = 11.6/ 36.6  BUN/ creatinine= 32/ 2.01  Glucose = 240

## 2018-02-16 DIAGNOSIS — R94.6 ABNORMAL RESULTS OF THYROID FUNCTION STUDIES: ICD-10-CM

## 2018-02-16 DIAGNOSIS — I25.10 ATHEROSCLEROTIC HEART DISEASE OF NATIVE CORONARY ARTERY WITHOUT ANGINA PECTORIS: ICD-10-CM

## 2018-02-16 DIAGNOSIS — N18.3 CHRONIC KIDNEY DISEASE, STAGE 3 (MODERATE): ICD-10-CM

## 2018-02-16 PROCEDURE — 76775 US EXAM ABDO BACK WALL LIM: CPT | Mod: 26

## 2018-02-16 PROCEDURE — 99152 MOD SED SAME PHYS/QHP 5/>YRS: CPT

## 2018-02-16 PROCEDURE — 99232 SBSQ HOSP IP/OBS MODERATE 35: CPT

## 2018-02-16 PROCEDURE — 93458 L HRT ARTERY/VENTRICLE ANGIO: CPT | Mod: 26

## 2018-02-16 RX ORDER — FUROSEMIDE 40 MG
40 TABLET ORAL DAILY
Qty: 0 | Refills: 0 | Status: DISCONTINUED | OUTPATIENT
Start: 2018-02-16 | End: 2018-02-19

## 2018-02-16 RX ORDER — INSULIN GLARGINE 100 [IU]/ML
16 INJECTION, SOLUTION SUBCUTANEOUS EVERY MORNING
Qty: 0 | Refills: 0 | Status: DISCONTINUED | OUTPATIENT
Start: 2018-02-16 | End: 2018-02-19

## 2018-02-16 RX ADMIN — INSULIN GLARGINE 16 UNIT(S): 100 INJECTION, SOLUTION SUBCUTANEOUS at 10:18

## 2018-02-16 RX ADMIN — SODIUM CHLORIDE 3 MILLILITER(S): 9 INJECTION INTRAMUSCULAR; INTRAVENOUS; SUBCUTANEOUS at 06:42

## 2018-02-16 RX ADMIN — FAMOTIDINE 20 MILLIGRAM(S): 10 INJECTION INTRAVENOUS at 12:29

## 2018-02-16 RX ADMIN — SODIUM CHLORIDE 3 MILLILITER(S): 9 INJECTION INTRAMUSCULAR; INTRAVENOUS; SUBCUTANEOUS at 13:28

## 2018-02-16 RX ADMIN — HEPARIN SODIUM 5000 UNIT(S): 5000 INJECTION INTRAVENOUS; SUBCUTANEOUS at 19:43

## 2018-02-16 RX ADMIN — Medication 12.5 MILLIGRAM(S): at 07:25

## 2018-02-16 RX ADMIN — Medication 81 MILLIGRAM(S): at 12:29

## 2018-02-16 RX ADMIN — HEPARIN SODIUM 5000 UNIT(S): 5000 INJECTION INTRAVENOUS; SUBCUTANEOUS at 07:25

## 2018-02-16 RX ADMIN — Medication 2: at 19:46

## 2018-02-16 RX ADMIN — Medication 2: at 13:28

## 2018-02-16 RX ADMIN — Medication 2: at 09:42

## 2018-02-16 RX ADMIN — SODIUM CHLORIDE 3 MILLILITER(S): 9 INJECTION INTRAMUSCULAR; INTRAVENOUS; SUBCUTANEOUS at 22:15

## 2018-02-16 RX ADMIN — Medication 12.5 MILLIGRAM(S): at 19:43

## 2018-02-16 NOTE — CONSULT NOTE ADULT - PROBLEM SELECTOR RECOMMENDATION 9
Patient is known to me from office  Baseline Scr 2.0  Renal function is stable  Planned for cardiac cath today  Use NaHCO3 150meq/L in sterile water 200cc/hr for 1 hr prior to cath then 75 cc/hr for 6 hrs after the cath
Patient presented with symptomatic hypoglycemia with FG in 20. FG improved with PO intake: classic Whipple triad.  Hypoglycemia likely secondary to poor PO intake and Lantus/Metformin in setting of JORGITO. JORGITO delays excretion of Insulin, can potentiate affect of Insulin.   FG are WNL while inpatient. Continue to monitor FG inpatient  Goal -180 while inpatient.

## 2018-02-16 NOTE — CONSULT NOTE ADULT - ASSESSMENT
57m with a history of insulin dependent DMII, HTN, Dyslipidemia, CKD, CAD with stents 8 years ago, came with hypoglycemia. Has h/o exertional SOB, stress test is positive. Planned for Cardiac cath
57y old  Male with PMH of Htn, HLD, Dm type 2 diagnosed 12 years ago and CAD s/p cardiac stents presented to the ED because of low FG of 20 at home, associated with weakness, lethargy and confusion.

## 2018-02-16 NOTE — PROGRESS NOTE ADULT - SUBJECTIVE AND OBJECTIVE BOX
Chief Complaint: type 2 diabetes     Interval history: Pt was to be discharged yesterday when we saw him, gave d/c recs, but then had abnormality on cardiac w/u so now being admitted.  Wasn' started on lantus as plan was to start at home.  Sugars 100-200s on ssi only.  Appetite good.     MEDICATIONS  (STANDING):  aspirin enteric coated 81 milliGRAM(s) Oral daily  famotidine    Tablet 20 milliGRAM(s) Oral daily  heparin  Injectable 5000 Unit(s) SubCutaneous every 12 hours  insulin glargine Injectable (LANTUS) 16 Unit(s) SubCutaneous every morning  insulin lispro (HumaLOG) corrective regimen sliding scale   SubCutaneous three times a day before meals  metoprolol     tartrate 12.5 milliGRAM(s) Oral two times a day  sodium chloride 0.9% lock flush 3 milliLiter(s) IV Push every 8 hours    MEDICATIONS  (PRN):      Allergies    ampicillin (Swelling)  penicillins (Swelling)    Intolerances      Review of Systems:  Skin: no rash  Endocrine: no polyuria, polydipsia    ALL OTHER SYSTEMS REVIEWED AND NEGATIVE    PHYSICAL EXAM:  VITALS: T(C): 36.7 (02-16-18 @ 10:48)  T(F): 98 (02-16-18 @ 10:48), Max: 98.2 (02-15-18 @ 17:40)  HR: 86 (02-16-18 @ 10:48) (75 - 95)  BP: 142/75 (02-16-18 @ 10:48) (132/78 - 149/90)  RR:  (16 - 18)  SpO2:  (96% - 100%)  Wt(kg): --  GENERAL: NAD, well-developed  EYES: No proptosis, sclera anicteric  HEENT:  Atraumatic, Normocephalic, moist mucous membranes  SKIN: Dry, intact, No diffuse rashes   PSYCH: Alert and oriented x 3, normal affect, normal mood    POCT Blood Glucose.: 229 mg/dL (02-16-18 @ 12:30)  POCT Blood Glucose.: 204 mg/dL (02-16-18 @ 09:13)  POCT Blood Glucose.: 236 mg/dL (02-15-18 @ 22:19)  POCT Blood Glucose.: 198 mg/dL (02-15-18 @ 17:36)  POCT Blood Glucose.: 220 mg/dL (02-15-18 @ 12:45)  POCT Blood Glucose.: 99 mg/dL (02-15-18 @ 05:57)  POCT Blood Glucose.: 142 mg/dL (02-15-18 @ 03:07)  POCT Blood Glucose.: 238 mg/dL (02-14-18 @ 21:47)  POCT Blood Glucose.: 161 mg/dL (02-14-18 @ 20:17)      02-14    136  |  100  |  32<H>  ----------------------------<  240<H>  4.6   |  22  |  2.01<H>    EGFR if : 41  EGFR if non : 36    Ca    8.5      02-14    TPro  6.8  /  Alb  4.1  /  TBili  0.2  /  DBili  x   /  AST  20  /  ALT  21  /  AlkPhos  67  02-14          Thyroid Function Tests:  02-14 @ 22:56 TSH 1.01 FreeT4 -- T3 -- Anti TPO -- Anti Thyroglobulin Ab -- TSI --      Hemoglobin A1C, Whole Blood: 6.8 % <H> [4.0 - 5.6] (02-14-18 @ 22:56)

## 2018-02-16 NOTE — CHART NOTE - NSCHARTNOTEFT_GEN_A_CORE
WARREN ABTISTA 57y Male s/p cath radial site check    Dressing is clear/dry/intact.   Site is without hematoma or bleeding.     Vital Signs Last 24 Hrs  T(C): 36.4 (16 Feb 2018 18:37), Max: 36.7 (15 Feb 2018 22:00)  T(F): 97.6 (16 Feb 2018 18:37), Max: 98.1 (16 Feb 2018 14:24)  HR: 85 (16 Feb 2018 18:37) (75 - 90)  BP: 145/76 (16 Feb 2018 18:37) (132/79 - 153/98)  RR: 18 (16 Feb 2018 18:37) (16 - 18)  SpO2: 100% (16 Feb 2018 18:37) (98% - 100%)    Pulses palpable, cap refill <2 sec.   Patient denies pain, numbness, tingling, CP, SOB. VSS.   Will continue to monitor.

## 2018-02-16 NOTE — CONSULT NOTE ADULT - SUBJECTIVE AND OBJECTIVE BOX
Dr. Cuevas  Office [phone]  Cell [phone]  Yanique LEONARD  Cell [phone]    HPI:  57m with a history of insulin dependent DMII, HTN, Dyslipidemia, CKD, CAD with stents 8 years ago, experiencing hypoglycemia associate with confusion, weakness and lightheadedness, FS of 20 at home. Pt states he's been eating less, only eat small breakfast in the morning, took his diabetic meds and then went to work without eating anything in between. As per wife, the patient came home in the evening, became confused, took his blood sugar, 20 and gave patient some juice and then called EMS. Admits taking Lantus 30Unit bid, and metformin 500mg bid. Reports having cardiac cath in the past for SOB, dyspnea on exertion. Admits having dyspnea on exertional for the past 3 months, no chest pain. States he hasn't seen a cardiologist for years, only follow up with PCP. Denies any fever, chills, nausea, vomit, chest pain, dizziness, syncope, visual changes, dysuria, leg pain, calf tenderness, recent travel, or any other complaints. He had stress test which came positive planned for cardiac cath      Allergies:  ampicillin (Swelling)  penicillins (Swelling)      PAST MEDICAL & SURGICAL HISTORY:  Unspecified essential hypertension  Type II or unspecified type diabetes mellitus without mention of complication, not stated as uncontr  S/P cardiac catheterization: 2010 (stent placement?)      Home Medications Reviewed    Hospital Medications:   MEDICATIONS  (STANDING):  aspirin enteric coated 81 milliGRAM(s) Oral daily  famotidine    Tablet 20 milliGRAM(s) Oral daily  furosemide   Injectable 40 milliGRAM(s) IV Push daily  heparin  Injectable 5000 Unit(s) SubCutaneous every 12 hours  insulin glargine Injectable (LANTUS) 16 Unit(s) SubCutaneous every morning  insulin lispro (HumaLOG) corrective regimen sliding scale   SubCutaneous three times a day before meals  metoprolol     tartrate 12.5 milliGRAM(s) Oral two times a day  sodium chloride 0.9% lock flush 3 milliLiter(s) IV Push every 8 hours      SOCIAL HISTORY:  Denies ETOH, Smoking,     FAMILY HISTORY:  No pertinent family history in first degree relatives      REVIEW OF SYSTEMS:  CONSTITUTIONAL: No weakness, fevers or chills  EYES/ENT: No visual changes;  No vertigo or throat pain   NECK: No pain or stiffness  RESPIRATORY: No cough, wheezing, hemoptysis; No shortness of breath  CARDIOVASCULAR: No chest pain or palpitations.  GASTROINTESTINAL: No abdominal or epigastric pain. No nausea, vomiting, or hematemesis; No diarrhea or constipation. No melena or hematochezia.  GENITOURINARY: No dysuria, frequency, foamy urine, urinary urgency, incontinence or hematuria  NEUROLOGICAL: No numbness or weakness  SKIN: No itching, burning, rashes, or lesions   VASCULAR: No bilateral lower extremity edema.   All other review of systems is negative unless indicated above.    VITALS:  T(F): 98.1 (02-16-18 @ 14:24), Max: 98.2 (02-15-18 @ 17:40)  HR: 86 (02-16-18 @ 14:24)  BP: 153/98 (02-16-18 @ 14:24)  RR: 18 (02-16-18 @ 14:24)  SpO2: 100% (02-16-18 @ 14:24)  Wt(kg): --    Height (cm): 180.34 (02-15 @ 22:38)  Weight (kg): 81.6 (02-15 @ 22:38)  BMI (kg/m2): 25.1 (02-15 @ 22:38)  BSA (m2): 2.02 (02-15 @ 22:38)    PHYSICAL EXAM:  Constitutional: NAD  HEENT: anicteric sclera, oropharynx clear, MMM  Neck: No JVD  Respiratory: CTAB, no wheezes, rales or rhonchi  Cardiovascular: S1, S2, RRR  Gastrointestinal: BS+, soft, NT/ND  Extremities: No cyanosis or clubbing. No peripheral edema  Neurological: A/O x 3, no focal deficits  Psychiatric: Normal mood, normal affect  : No CVA tenderness. No solitario.   Skin: No rashes       LABS:  02-14    136  |  100  |  32<H>  ----------------------------<  240<H>  4.6   |  22  |  2.01<H>    Ca    8.5      14 Feb 2018 22:56    TPro  6.8  /  Alb  4.1  /  TBili  0.2  /  DBili      /  AST  20  /  ALT  21  /  AlkPhos  67  02-14    Creatinine Trend: 2.01 <--                        11.6   7.90  )-----------( 247      ( 14 Feb 2018 22:56 )             36.6     Urine Studies:        RADIOLOGY & ADDITIONAL STUDIES:

## 2018-02-16 NOTE — CONSULT NOTE ADULT - SUBJECTIVE AND OBJECTIVE BOX
Chief Complaint/Reason for Admission: Chest pain	  History of Present Illness: 	  57m with a history of insulin dependent DMII, HTN, Dyslipidemia, CAD with stents 8 years ago, experiencing hypoglycemia associate with confusion, weakness and lightheadedness, FS of 20 at home. Pt states he's been eating less, only eat small breakfast in the morning, took his diabetic meds and then went to work without eating anything in between. As per wife, the patient came home in the evening, became confused, took his blood sugar, 20 and gave patient some juice and then called EMS. Admits taking Lantus 30Unit bid, and metformin 500mg bid. Reports having cardiac cath in the past for SOB, dyspnea on exertion. Admits having dyspnea on exertional for the past 3 months, no chest pain. States he hasn't seen a cardiologist for years, only follow up with PCP. Denies any fever, chills, nausea, vomit, chest pain, dizziness, syncope, visual changes, dysuria, leg pain, calf tenderness, recent travel, or any other complaints.     In the Ed, the pt had a Stress test = :Abnormal Study  * Myocardial Perfusion SPECT results are abnormal.  * Review of raw data shows: The study is of adequate technical quality  * The left ventricle was enlarged. There are medium sized, moderate defects in inferior and inferolateral walls that are reversible, suggestive of ischemia. Transient ischemic dilation of the LV was noted with a ratio of: 1.2  * Rest gated wall motion analysis was performed (LVEF = 44 %;LVEDV = 160 ml.), revealing moderately reduced  LV function.  Post-stress gated wall motion analysis was performed (LVEF = 47 %;LVEDV = 197 ml.), revealing mild overall hypokinesis. There was severe inferior and inferolateral hypokinesis with moderately diminished systolic thickening. The remaining segments appeared mildly hypocontractile RV function appeared normal.    Seen by Endocrine. Their consult and recommendations are in chart.     Review of Systems:  · Negative General Symptoms	no chills; no sweating; no anorexia; no weight loss; no weight gain; no polyphagia	  · Skin/Breast	negative	  · Negative Ophthalmologic Symptoms	no photophobia; no lacrimation L; no lacrimation R	  · Negative ENMT Symptoms	no hearing difficulty; no ear pain; no tinnitus; no vertigo	  · Negative Respiratory and Thorax Symptoms	no wheezing; no dyspnea; no cough	  · Negative Cardiovascular Symptoms	no chest pain; no palpitations; no dyspnea on exertion; no orthopnea	  · Negative Gastrointestinal Symptoms	no nausea; no vomiting; no diarrhea; no constipation	  · Negative General Genitourinary Symptoms	no dysuria	  · Negative Musculoskeletal Symptoms	no arthritis; no joint swelling; no myalgia	  · Negative Neurological Symptoms	no paresthesias; no generalized seizures; no focal seizures; no syncope	  · Psychiatric	negative	  · Hematology/Lymphatics	negative	  · Endocrine	negative	  · Allergic/Immunologic	negative	      Allergies and Intolerances:        Allergies:  	penicillins: Drug Category, Swelling  	ampicillin: Drug, Swelling    Home Medications:   * Incomplete Medication History as of 15-Feb-2018 22:19 documented in Structured Notes  · 	Lantus Solostar Pen 100 units/mL subcutaneous solution: 15 unit(s) subcutaneous 2 times a day  	 once in morning and once in evening, Last Dose Taken:    · 	aspirin 81 mg oral delayed release tablet: 1 tab(s) orally once a day, Last Dose Taken:    · 	metFORMIN 1000 mg oral tablet, extended release: 1 tab(s) orally once a day  · 	Cozaar 25 mg oral tablet: 1 tab(s) orally once a day, Last Dose Taken:      Patient History:   Past Medical History:  Type II or unspecified type diabetes mellitus without mention of complication, not stated as uncontr    Unspecified essential hypertension.    Past Surgical History:  S/P cardiac catheterization  2010 (stent placement?).    Family History:  No pertinent family history in first degree relatives.    Social History:  Social History (marital status, living situation, occupation, tobacco use, alcohol and drug use, and sexual history):  and lives with spouse.  Denies ETOH. Nicotine use 3 to 4 cigarettes / week.	    Tobacco Screening:  · Core Measure Site	No	  · Has the patient used tobacco in the past 30 days?	Yes	  · Tobacco Cessation Education/Counseling	Offered and patient declined	  · Tobacco Cessation Medication	Offered and patient declined	    Risk Assessment:   Present on Admission:  Deep Venous Thrombosis	no	  Pulmonary Embolus	no	  Urinary Catheter	no	  Central Venous Catheter/PICC Line	no	  Surgical Site Incision	no	  Pressure Ulcer(s)	no	    Heart Failure:  Does this patient have a history of or has been diagnosed with heart failure? no.    HIV Screen (per [facility], HIV screening must be offered to every individual between ages 13 and 64)	Offered and patient declined	  Hepatitis C Screen (per [facility], hepatitis C screening must be offered to every individual born between 1945 and 1965)	Offered and patient declined	      Height/Weight/BSA/BMI:  · Height (FEET)	5 Feet	  · Height (INCHES)	11 Inch(s)	  · Height (CENTIMETERS)	180.34 Centimeter(s)	  · 	 used	  · Dosing Weight (KILOGRAMS)	81.6 kg	  · Dosing Weight  (POUNDS)	179.8 Pound(s)	  · BSA (m2)	2.02 Meter Squared	  · BMI (kG/m2)	25.1	    Physical Exam:  · Constitutional	detailed exam	  · Constitutional Details	well-nourished; no distress	  · Eyes	detailed exam	  · Eyes Details	EOMI; conjunctiva clear	  · ENMT	detailed exam	  · ENMT Details	nose; mouth	  · Nose	no discharge	  · Mouth	moist	  · Neck	detailed exam	  · Neck Details	supple	  · Breasts	not examined	  · Back	detailed exam	  · Back Details	normal shape; strength intact	  · Respiratory	detailed exam	  · Respiratory Details	airway patent; breath sounds equal; good air movement; respirations non-labored; clear to auscultation bilaterally; no chest wall tenderness; no intercostal retractions; no rales; no rhonchi; no subcutaneous emphysema; no wheezes	  · Cardiovascular	detailed exam	  · Cardiovascular Details	regular rate and rhythm  no rub  no murmur	  · Gastrointestinal	detailed exam	  · GI Normal	soft; nontender; no distention; no masses palpable; bowel sounds normal; no bruit; no rebound tenderness; no guarding; no rigidity; no organomegaly	  · Genitourinary	not examined	  · Rectal	not examined	  · Extremities	detailed exam	  · Extremities Details	no clubbing; no cyanosis; no pedal edema	  · Vascular	detailed exam	  · Radial Pulse	right normal; left normal	  · DP Pulse	right normal; left normal	  · PT Pulse	right normal; left normal	  · Neurological	detailed exam	  · Neurological Details	responds to verbal commands; sensation intact; no spontaneous movement; normal strength	  · Skin	not examined	  · Lymph Nodes	not examined	  · Musculoskeletal	detailed exam	  · Musculoskeletal Details	no joint erythema; no joint warmth; no calf tenderness; normal strength	  · Psychiatric	not examined	      Labs and Results:  Labs, Radiology, Cardiology, and Other Results: NST-:Abnormal Study  * Myocardial Perfusion SPECT results are abnormal.  * Review of raw data shows: The study is of adequate technical quality  * The left ventricle was enlarged. There are medium sized, moderate defects in inferior and inferolateral walls that are reversible, suggestive of ischemia.Transient ischemic dilation of the LV was noted with a ratio of: 1.2  * Rest gated wall motion analysis was performed (LVEF = 44 %;LVEDV = 160 ml.), revealing moderately reduced  LV function.  Post-stress gated wall motion analysis was performed (LVEF = 47 %;LVEDV = 197 ml.), revealing mild overall hypokinesis. There was severe inferior and inferolateral hypokinesis with moderately diminished systolic thickening. The remaining segments appeared mildly hypcontracitile. RV function appeared normal.   NSR 79b/ min, LAE, LAFB, TWI = 1, AVL, V5 V6, QT/ QTC= 404/ 463  CE negative x 1  H & H = 11.6/ 36.6  BUN/ creatinine= 32/ 2.01 Glucose = 240	    Assessment and Plan:   Assessment:  · Assessment		  57M admitted for chest pain, positive stress test, likely C.C. 2/16 and Hypoglycemia.     Problem/Plan - 1:  ·  Problem: ACS (acute coronary syndrome).  Plan: CM  Give O2, ASA, low dose BB started.  Cardiac cath today    Problem/Plan - 2:  ·  Problem: Diabetes mellitus, type 2.  Plan: FS QID  HISS  DM Diet.  Endocrine consult appreciated = Goal -180 while inpatient. Plan discharge on Lantus 24 U once a day ad Humalog 6 U before meal, provided patient should not Humalog if he miss a meal.     Problem/Plan - 3:  ·  Problem: JORGITO (acute kidney injury).  Plan: F/U renal sono   ARB held.     Problem/Plan - 4:  ·  Problem: Hypertension, unspecified type.  Plan: DASH Diet.  Continue BP meds.

## 2018-02-16 NOTE — CONSULT NOTE ADULT - PROBLEM SELECTOR PROBLEM 2
Hypertension, unspecified type
Type 2 diabetes mellitus with hypoglycemia without coma, with long-term current use of insulin

## 2018-02-16 NOTE — CONSULT NOTE ADULT - PROBLEM SELECTOR RECOMMENDATION 3
Planned for cardiac cath  Follow up cardiology
Management of JORGITO as per primary team. Discontinue metformin for now given JORGITO.   Please reassess need of losartan in setting of JORGITO.

## 2018-02-17 LAB
BASOPHILS # BLD AUTO: 0.04 K/UL — SIGNIFICANT CHANGE UP (ref 0–0.2)
BASOPHILS NFR BLD AUTO: 0.9 % — SIGNIFICANT CHANGE UP (ref 0–2)
BUN SERPL-MCNC: 43 MG/DL — HIGH (ref 7–23)
CALCIUM SERPL-MCNC: 9.2 MG/DL — SIGNIFICANT CHANGE UP (ref 8.4–10.5)
CHLORIDE SERPL-SCNC: 101 MMOL/L — SIGNIFICANT CHANGE UP (ref 98–107)
CO2 SERPL-SCNC: 20 MMOL/L — LOW (ref 22–31)
CREAT SERPL-MCNC: 2.16 MG/DL — HIGH (ref 0.5–1.3)
EOSINOPHIL # BLD AUTO: 0.17 K/UL — SIGNIFICANT CHANGE UP (ref 0–0.5)
EOSINOPHIL NFR BLD AUTO: 3.6 % — SIGNIFICANT CHANGE UP (ref 0–6)
GLUCOSE SERPL-MCNC: 156 MG/DL — HIGH (ref 70–99)
HCT VFR BLD CALC: 38.9 % — LOW (ref 39–50)
HGB BLD-MCNC: 11.9 G/DL — LOW (ref 13–17)
IMM GRANULOCYTES # BLD AUTO: 0.01 # — SIGNIFICANT CHANGE UP
IMM GRANULOCYTES NFR BLD AUTO: 0.2 % — SIGNIFICANT CHANGE UP (ref 0–1.5)
LYMPHOCYTES # BLD AUTO: 1.17 K/UL — SIGNIFICANT CHANGE UP (ref 1–3.3)
LYMPHOCYTES # BLD AUTO: 25 % — SIGNIFICANT CHANGE UP (ref 13–44)
MAGNESIUM SERPL-MCNC: 2.1 MG/DL — SIGNIFICANT CHANGE UP (ref 1.6–2.6)
MCHC RBC-ENTMCNC: 23 PG — LOW (ref 27–34)
MCHC RBC-ENTMCNC: 30.6 % — LOW (ref 32–36)
MCV RBC AUTO: 75.2 FL — LOW (ref 80–100)
MONOCYTES # BLD AUTO: 0.66 K/UL — SIGNIFICANT CHANGE UP (ref 0–0.9)
MONOCYTES NFR BLD AUTO: 14.1 % — HIGH (ref 2–14)
NEUTROPHILS # BLD AUTO: 2.63 K/UL — SIGNIFICANT CHANGE UP (ref 1.8–7.4)
NEUTROPHILS NFR BLD AUTO: 56.2 % — SIGNIFICANT CHANGE UP (ref 43–77)
NRBC # FLD: 0 — SIGNIFICANT CHANGE UP
PLATELET # BLD AUTO: 285 K/UL — SIGNIFICANT CHANGE UP (ref 150–400)
PMV BLD: 11.3 FL — SIGNIFICANT CHANGE UP (ref 7–13)
POTASSIUM SERPL-MCNC: 4.4 MMOL/L — SIGNIFICANT CHANGE UP (ref 3.5–5.3)
POTASSIUM SERPL-SCNC: 4.4 MMOL/L — SIGNIFICANT CHANGE UP (ref 3.5–5.3)
RBC # BLD: 5.17 M/UL — SIGNIFICANT CHANGE UP (ref 4.2–5.8)
RBC # FLD: 16.8 % — HIGH (ref 10.3–14.5)
SODIUM SERPL-SCNC: 138 MMOL/L — SIGNIFICANT CHANGE UP (ref 135–145)
WBC # BLD: 4.68 K/UL — SIGNIFICANT CHANGE UP (ref 3.8–10.5)
WBC # FLD AUTO: 4.68 K/UL — SIGNIFICANT CHANGE UP (ref 3.8–10.5)

## 2018-02-17 PROCEDURE — 99232 SBSQ HOSP IP/OBS MODERATE 35: CPT

## 2018-02-17 RX ORDER — INFLUENZA VIRUS VACCINE 15; 15; 15; 15 UG/.5ML; UG/.5ML; UG/.5ML; UG/.5ML
0.5 SUSPENSION INTRAMUSCULAR ONCE
Qty: 0 | Refills: 0 | Status: COMPLETED | OUTPATIENT
Start: 2018-02-17 | End: 2018-02-19

## 2018-02-17 RX ORDER — INSULIN LISPRO 100/ML
3 VIAL (ML) SUBCUTANEOUS
Qty: 0 | Refills: 0 | Status: DISCONTINUED | OUTPATIENT
Start: 2018-02-17 | End: 2018-02-18

## 2018-02-17 RX ORDER — METOPROLOL TARTRATE 50 MG
25 TABLET ORAL DAILY
Qty: 0 | Refills: 0 | Status: DISCONTINUED | OUTPATIENT
Start: 2018-02-18 | End: 2018-02-19

## 2018-02-17 RX ADMIN — SODIUM CHLORIDE 3 MILLILITER(S): 9 INJECTION INTRAMUSCULAR; INTRAVENOUS; SUBCUTANEOUS at 06:10

## 2018-02-17 RX ADMIN — FAMOTIDINE 20 MILLIGRAM(S): 10 INJECTION INTRAVENOUS at 11:04

## 2018-02-17 RX ADMIN — Medication 12.5 MILLIGRAM(S): at 05:12

## 2018-02-17 RX ADMIN — HEPARIN SODIUM 5000 UNIT(S): 5000 INJECTION INTRAVENOUS; SUBCUTANEOUS at 05:12

## 2018-02-17 RX ADMIN — Medication 3 UNIT(S): at 17:24

## 2018-02-17 RX ADMIN — Medication 1: at 08:53

## 2018-02-17 RX ADMIN — SODIUM CHLORIDE 3 MILLILITER(S): 9 INJECTION INTRAMUSCULAR; INTRAVENOUS; SUBCUTANEOUS at 15:20

## 2018-02-17 RX ADMIN — INSULIN GLARGINE 16 UNIT(S): 100 INJECTION, SOLUTION SUBCUTANEOUS at 08:52

## 2018-02-17 RX ADMIN — Medication 40 MILLIGRAM(S): at 05:12

## 2018-02-17 RX ADMIN — Medication 3: at 12:42

## 2018-02-17 RX ADMIN — HEPARIN SODIUM 5000 UNIT(S): 5000 INJECTION INTRAVENOUS; SUBCUTANEOUS at 17:24

## 2018-02-17 RX ADMIN — Medication 81 MILLIGRAM(S): at 11:04

## 2018-02-17 RX ADMIN — SODIUM CHLORIDE 3 MILLILITER(S): 9 INJECTION INTRAMUSCULAR; INTRAVENOUS; SUBCUTANEOUS at 21:45

## 2018-02-17 NOTE — PROGRESS NOTE ADULT - SUBJECTIVE AND OBJECTIVE BOX
Chief Complaint:  DM 2    History: Patient denies complaints at time of follow up. Tolerating eating, no hypoglycemia noted.     MEDICATIONS  (STANDING):  aspirin enteric coated 81 milliGRAM(s) Oral daily  famotidine    Tablet 20 milliGRAM(s) Oral daily  furosemide   Injectable 40 milliGRAM(s) IV Push daily  heparin  Injectable 5000 Unit(s) SubCutaneous every 12 hours  influenza   Vaccine 0.5 milliLiter(s) IntraMuscular once  insulin glargine Injectable (LANTUS) 16 Unit(s) SubCutaneous every morning  insulin lispro (HumaLOG) corrective regimen sliding scale   SubCutaneous three times a day before meals  sodium chloride 0.9% lock flush 3 milliLiter(s) IV Push every 8 hours    MEDICATIONS  (PRN):      Allergies    ampicillin (Swelling)  penicillins (Swelling)        Review of Systems:  Constitutional: No fever  Cardiovascular: No chest pain, palpitations  Respiratory: No SOB, no cough  GI: No nausea, vomiting, abdominal pain    PHYSICAL EXAM:  VITALS: T(C): 36.6 (02-17-18 @ 05:09)  T(F): 97.8 (02-17-18 @ 05:09), Max: 98.1 (02-16-18 @ 14:24)  HR: 80 (02-17-18 @ 05:09) (74 - 86)  BP: 130/77 (02-17-18 @ 05:09) (125/72 - 153/98)  RR:  (18 - 18)  SpO2:  (97% - 100%)  Wt(kg): --  GENERAL: NAD  EYES: No proptosis, no lid lag, anicteric  HEENT:  Atraumatic, Normocephalic, moist mucous membranes  RESPIRATORY: Non labored  GI: Soft, nontender, non distended, normal bowel sounds  PSYCH: Alert and oriented x 3, normal affect, normal mood    POCT Blood Glucose.: 266 mg/dL (02-17-18 @ 12:33)  POCT Blood Glucose.: 189 mg/dL (02-17-18 @ 08:22)  POCT Blood Glucose.: 154 mg/dL (02-16-18 @ 21:45)  POCT Blood Glucose.: 232 mg/dL (02-16-18 @ 19:10)  POCT Blood Glucose.: 148 mg/dL (02-16-18 @ 16:36)  POCT Blood Glucose.: 229 mg/dL (02-16-18 @ 12:30)  POCT Blood Glucose.: 204 mg/dL (02-16-18 @ 09:13)  POCT Blood Glucose.: 236 mg/dL (02-15-18 @ 22:19)  POCT Blood Glucose.: 198 mg/dL (02-15-18 @ 17:36)  POCT Blood Glucose.: 220 mg/dL (02-15-18 @ 12:45)  POCT Blood Glucose.: 99 mg/dL (02-15-18 @ 05:57)  POCT Blood Glucose.: 142 mg/dL (02-15-18 @ 03:07)  POCT Blood Glucose.: 238 mg/dL (02-14-18 @ 21:47)  POCT Blood Glucose.: 161 mg/dL (02-14-18 @ 20:17)      02-17    138  |  101  |  43<H>  ----------------------------<  156<H>  4.4   |  20<L>  |  2.16<H>    EGFR if : 38  EGFR if non : 33    Ca    9.2      02-17  Mg     2.1     02-17    TPro  6.8  /  Alb  4.1  /  TBili  0.2  /  DBili  x   /  AST  20  /  ALT  21  /  AlkPhos  67  02-14          Thyroid Function Tests:  02-14 @ 22:56 TSH 1.01 FreeT4 -- T3 -- Anti TPO -- Anti Thyroglobulin Ab -- TSI --      Hemoglobin A1C, Whole Blood: 6.8 % <H> [4.0 - 5.6] (02-14-18 @ 22:56)

## 2018-02-17 NOTE — PROGRESS NOTE ADULT - SUBJECTIVE AND OBJECTIVE BOX
Dr. Cuevas  Office [phone]  Cell [phone]  Yanique LEONARD  Cell [phone]      Patient is a 57y old  Male who presents with a chief complaint of Chest pain       Patient seen and examined at bedside. No chest pain/sob    VITALS:  T(F): 98.2 (02-17-18 @ 14:30), Max: 98.2 (02-17-18 @ 14:30)  HR: 85 (02-17-18 @ 14:30)  BP: 130/71 (02-17-18 @ 14:30)  RR: 18 (02-17-18 @ 14:30)  SpO2: 100% (02-17-18 @ 14:30)  Wt(kg): --        PHYSICAL EXAM:  Constitutional: NAD  HEENT: anicteric sclera, oropharynx clear, MMM  Neck: No JVD  Respiratory: CTAB, no wheezes, rales or rhonchi  Cardiovascular: S1, S2, RRR  Gastrointestinal: BS+, soft, NT/ND  Extremities: No cyanosis or clubbing. No peripheral edema  Neurological: A/O x 3, no focal deficits  Psychiatric: Normal mood, normal affect  : No CVA tenderness. No solitario.   Skin: No rashes    Hospital Medications:   MEDICATIONS  (STANDING):  aspirin enteric coated 81 milliGRAM(s) Oral daily  famotidine    Tablet 20 milliGRAM(s) Oral daily  furosemide   Injectable 40 milliGRAM(s) IV Push daily  heparin  Injectable 5000 Unit(s) SubCutaneous every 12 hours  influenza   Vaccine 0.5 milliLiter(s) IntraMuscular once  insulin glargine Injectable (LANTUS) 16 Unit(s) SubCutaneous every morning  insulin lispro (HumaLOG) corrective regimen sliding scale   SubCutaneous three times a day before meals  insulin lispro Injectable (HumaLOG) 3 Unit(s) SubCutaneous three times a day before meals  sodium chloride 0.9% lock flush 3 milliLiter(s) IV Push every 8 hours      LABS:  02-17    138  |  101  |  43<H>  ----------------------------<  156<H>  4.4   |  20<L>  |  2.16<H>    Ca    9.2      17 Feb 2018 06:30  Mg     2.1     02-17      Creatinine Trend: 2.16 <--, 2.01 <--                                11.9   4.68  )-----------( 285      ( 17 Feb 2018 06:30 )             38.9     Urine Studies:      HbA1c 6.8      [02-14-18 @ 22:56]  TSH 1.01      [02-14-18 @ 22:56]  Lipid: chol 196, , HDL 33,       [06-28-17 @ 10:12]        RADIOLOGY & ADDITIONAL STUDIES:

## 2018-02-17 NOTE — PROGRESS NOTE ADULT - SUBJECTIVE AND OBJECTIVE BOX
chief complaint : chest pain         SUBJECTIVE / OVERNIGHT EVENTS: pt denies chest pain, shortness of breath, nausea,v       MEDICATIONS  (STANDING):  aspirin enteric coated 81 milliGRAM(s) Oral daily  famotidine    Tablet 20 milliGRAM(s) Oral daily  furosemide   Injectable 40 milliGRAM(s) IV Push daily  heparin  Injectable 5000 Unit(s) SubCutaneous every 12 hours  influenza   Vaccine 0.5 milliLiter(s) IntraMuscular once  insulin glargine Injectable (LANTUS) 16 Unit(s) SubCutaneous every morning  insulin lispro (HumaLOG) corrective regimen sliding scale   SubCutaneous three times a day before meals  insulin lispro Injectable (HumaLOG) 3 Unit(s) SubCutaneous three times a day before meals  sodium chloride 0.9% lock flush 3 milliLiter(s) IV Push every 8 hours    MEDICATIONS  (PRN):        CAPILLARY BLOOD GLUCOSE      POCT Blood Glucose.: 266 mg/dL (17 Feb 2018 12:33)  POCT Blood Glucose.: 189 mg/dL (17 Feb 2018 08:22)  POCT Blood Glucose.: 154 mg/dL (16 Feb 2018 21:45)  POCT Blood Glucose.: 232 mg/dL (16 Feb 2018 19:10)  POCT Blood Glucose.: 148 mg/dL (16 Feb 2018 16:36)    I&O's Summary    Vital Signs Last 24 Hrs  T(C): 36.8 (17 Feb 2018 14:30), Max: 36.8 (17 Feb 2018 14:30)  T(F): 98.2 (17 Feb 2018 14:30), Max: 98.2 (17 Feb 2018 14:30)  HR: 85 (17 Feb 2018 14:30) (74 - 85)  BP: 130/71 (17 Feb 2018 14:30) (125/72 - 145/76)  BP(mean): --  RR: 18 (17 Feb 2018 14:30) (18 - 18)  SpO2: 100% (17 Feb 2018 14:30) (97% - 100%)  Constitutional: No fever, fatigue  Skin: No rash.  Eyes: No recent vision problems or eye pain.  ENT: No congestion, ear pain, or sore throat.  Cardiovascular: No chest pain or palpation.  Respiratory: No cough, shortness of breath, congestion, or wheezing.  Gastrointestinal: No abdominal pain, nausea, vomiting, or diarrhea.  Genitourinary: No dysuria.  Musculoskeletal: No joint swelling.  Neurologic: No headache.    PHYSICAL EXAM:  GENERAL: NAD  EYES: EOMI, PERRLA  NECK: Supple, No JVD  CHEST/LUNG: dec breath sounds at bases  HEART:  S1 , S2 +  ABDOMEN: soft, bs+  EXTREMITIES:  no edema  NEUROLOGY: alert awake calm cooperative      LABS:                        11.9   4.68  )-----------( 285      ( 17 Feb 2018 06:30 )             38.9     02-17    138  |  101  |  43<H>  ----------------------------<  156<H>  4.4   |  20<L>  |  2.16<H>    Ca    9.2      17 Feb 2018 06:30  Mg     2.1     02-17                RADIOLOGY & ADDITIONAL TESTS:    Imaging Personally Reviewed:    Consultant(s) Notes Reviewed:      Care Discussed with Consultants/Other Providers:

## 2018-02-17 NOTE — PROGRESS NOTE ADULT - SUBJECTIVE AND OBJECTIVE BOX
Cardiovascular Disease Progress Note    Overnight events: No acute events overnight. Patient says his SOB is improved.     Otherwise review of systems negative    Objective Findings:  T(C): 36.6 (02-17-18 @ 05:09), Max: 36.7 (02-16-18 @ 14:24)  HR: 80 (02-17-18 @ 05:09) (74 - 86)  BP: 130/77 (02-17-18 @ 05:09) (125/72 - 153/98)  RR: 18 (02-17-18 @ 05:09) (18 - 18)  SpO2: 97% (02-17-18 @ 05:09) (97% - 100%)  Wt(kg): --  Daily     Daily       Physical Exam:  Gen: NAD  HEENT: EOMI  CV: RRR, normal S1 + S2, no m/r/g  Lungs: Crackles b/l  Abd: soft, non-tender  Ext: No edema    Telemetry: Sinus with occasional APDs.    Laboratory Data:                        11.9   4.68  )-----------( 285      ( 17 Feb 2018 06:30 )             38.9     02-17    138  |  101  |  43<H>  ----------------------------<  156<H>  4.4   |  20<L>  |  2.16<H>    Ca    9.2      17 Feb 2018 06:30  Mg     2.1     02-17                Inpatient Medications:  MEDICATIONS  (STANDING):  aspirin enteric coated 81 milliGRAM(s) Oral daily  famotidine    Tablet 20 milliGRAM(s) Oral daily  furosemide   Injectable 40 milliGRAM(s) IV Push daily  heparin  Injectable 5000 Unit(s) SubCutaneous every 12 hours  influenza   Vaccine 0.5 milliLiter(s) IntraMuscular once  insulin glargine Injectable (LANTUS) 16 Unit(s) SubCutaneous every morning  insulin lispro (HumaLOG) corrective regimen sliding scale   SubCutaneous three times a day before meals  metoprolol     tartrate 12.5 milliGRAM(s) Oral two times a day  sodium chloride 0.9% lock flush 3 milliLiter(s) IV Push every 8 hours      Assessment: 7 year old man with HTN and controlled IDDM presents with altered mental status and acute systolic CHF    #Acute systolic CHF-   Due to NICM  Cardiac cath performed 2/16/2018 reveals nonobstructive CAD, but LVEDP of 40 mmHg.  Continue IV Lasix diuresis today.  Continue ASA and BB.  Will discussed with renal regarding starting ACEi.     #Controlled IDDM-   Continue home dose Lantus.  Endo input noted.     #HTN- Controlled on current regimen.      Over 35 minutes spent on total encounter; more than 50% of the visit was spent counseling and/or coordinating care by the attending physician.      Waqar Lemus MD [facility]  Cardiovascular Disease  [phone]

## 2018-02-18 LAB
BUN SERPL-MCNC: 49 MG/DL — HIGH (ref 7–23)
CALCIUM SERPL-MCNC: 8.3 MG/DL — LOW (ref 8.4–10.5)
CHLORIDE SERPL-SCNC: 103 MMOL/L — SIGNIFICANT CHANGE UP (ref 98–107)
CO2 SERPL-SCNC: 22 MMOL/L — SIGNIFICANT CHANGE UP (ref 22–31)
CREAT SERPL-MCNC: 2.15 MG/DL — HIGH (ref 0.5–1.3)
GLUCOSE SERPL-MCNC: 118 MG/DL — HIGH (ref 70–99)
HCT VFR BLD CALC: 37.3 % — LOW (ref 39–50)
HGB BLD-MCNC: 11.6 G/DL — LOW (ref 13–17)
MAGNESIUM SERPL-MCNC: 2.1 MG/DL — SIGNIFICANT CHANGE UP (ref 1.6–2.6)
MCHC RBC-ENTMCNC: 23.2 PG — LOW (ref 27–34)
MCHC RBC-ENTMCNC: 31.1 % — LOW (ref 32–36)
MCV RBC AUTO: 74.6 FL — LOW (ref 80–100)
NRBC # FLD: 0 — SIGNIFICANT CHANGE UP
PLATELET # BLD AUTO: 264 K/UL — SIGNIFICANT CHANGE UP (ref 150–400)
PMV BLD: 10.9 FL — SIGNIFICANT CHANGE UP (ref 7–13)
POTASSIUM SERPL-MCNC: 4.2 MMOL/L — SIGNIFICANT CHANGE UP (ref 3.5–5.3)
POTASSIUM SERPL-SCNC: 4.2 MMOL/L — SIGNIFICANT CHANGE UP (ref 3.5–5.3)
RBC # BLD: 5 M/UL — SIGNIFICANT CHANGE UP (ref 4.2–5.8)
RBC # FLD: 16.7 % — HIGH (ref 10.3–14.5)
SODIUM SERPL-SCNC: 139 MMOL/L — SIGNIFICANT CHANGE UP (ref 135–145)
WBC # BLD: 4.53 K/UL — SIGNIFICANT CHANGE UP (ref 3.8–10.5)
WBC # FLD AUTO: 4.53 K/UL — SIGNIFICANT CHANGE UP (ref 3.8–10.5)

## 2018-02-18 PROCEDURE — 99232 SBSQ HOSP IP/OBS MODERATE 35: CPT

## 2018-02-18 RX ORDER — LOSARTAN POTASSIUM 100 MG/1
50 TABLET, FILM COATED ORAL DAILY
Qty: 0 | Refills: 0 | Status: DISCONTINUED | OUTPATIENT
Start: 2018-02-18 | End: 2018-02-19

## 2018-02-18 RX ORDER — INSULIN LISPRO 100/ML
4 VIAL (ML) SUBCUTANEOUS
Qty: 0 | Refills: 0 | Status: DISCONTINUED | OUTPATIENT
Start: 2018-02-18 | End: 2018-02-19

## 2018-02-18 RX ADMIN — HEPARIN SODIUM 5000 UNIT(S): 5000 INJECTION INTRAVENOUS; SUBCUTANEOUS at 07:24

## 2018-02-18 RX ADMIN — Medication 25 MILLIGRAM(S): at 07:24

## 2018-02-18 RX ADMIN — Medication 2: at 18:03

## 2018-02-18 RX ADMIN — INSULIN GLARGINE 16 UNIT(S): 100 INJECTION, SOLUTION SUBCUTANEOUS at 08:59

## 2018-02-18 RX ADMIN — Medication 81 MILLIGRAM(S): at 11:45

## 2018-02-18 RX ADMIN — FAMOTIDINE 20 MILLIGRAM(S): 10 INJECTION INTRAVENOUS at 11:45

## 2018-02-18 RX ADMIN — SODIUM CHLORIDE 3 MILLILITER(S): 9 INJECTION INTRAMUSCULAR; INTRAVENOUS; SUBCUTANEOUS at 21:16

## 2018-02-18 RX ADMIN — Medication 3 UNIT(S): at 08:59

## 2018-02-18 RX ADMIN — HEPARIN SODIUM 5000 UNIT(S): 5000 INJECTION INTRAVENOUS; SUBCUTANEOUS at 17:41

## 2018-02-18 RX ADMIN — SODIUM CHLORIDE 3 MILLILITER(S): 9 INJECTION INTRAMUSCULAR; INTRAVENOUS; SUBCUTANEOUS at 08:12

## 2018-02-18 RX ADMIN — SODIUM CHLORIDE 3 MILLILITER(S): 9 INJECTION INTRAMUSCULAR; INTRAVENOUS; SUBCUTANEOUS at 13:23

## 2018-02-18 RX ADMIN — Medication 1: at 13:23

## 2018-02-18 RX ADMIN — Medication 4 UNIT(S): at 18:04

## 2018-02-18 RX ADMIN — Medication 3 UNIT(S): at 13:23

## 2018-02-18 RX ADMIN — Medication 40 MILLIGRAM(S): at 07:14

## 2018-02-18 RX ADMIN — LOSARTAN POTASSIUM 50 MILLIGRAM(S): 100 TABLET, FILM COATED ORAL at 11:45

## 2018-02-18 NOTE — PROGRESS NOTE ADULT - SUBJECTIVE AND OBJECTIVE BOX
Patient seen and examined at bedside. No chest pain/sob    VITALS:  T(F): 98 (02-18-18 @ 11:28), Max: 98.7 (02-17-18 @ 20:34)  HR: 77 (02-18-18 @ 11:28)  BP: 136/76 (02-18-18 @ 11:28)  RR: 17 (02-18-18 @ 11:28)  SpO2: 100% (02-18-18 @ 11:28)  Wt(kg): --    02-17 @ 07:01  -  02-18 @ 07:00  --------------------------------------------------------  IN: 200 mL / OUT: 600 mL / NET: -400 mL    02-18 @ 07:01  -  02-18 @ 12:08  --------------------------------------------------------  IN: 240 mL / OUT: 325 mL / NET: -85 mL          PHYSICAL EXAM:  Constitutional: NAD  Neck: No JVD  Respiratory: CTAB, no wheezes, rales or rhonchi  Cardiovascular: S1, S2, RRR  Gastrointestinal: BS+, soft, NT/ND  Extremities: No peripheral edema    Hospital Medications:   MEDICATIONS  (STANDING):  aspirin enteric coated 81 milliGRAM(s) Oral daily  famotidine    Tablet 20 milliGRAM(s) Oral daily  furosemide   Injectable 40 milliGRAM(s) IV Push daily  heparin  Injectable 5000 Unit(s) SubCutaneous every 12 hours  influenza   Vaccine 0.5 milliLiter(s) IntraMuscular once  insulin glargine Injectable (LANTUS) 16 Unit(s) SubCutaneous every morning  insulin lispro (HumaLOG) corrective regimen sliding scale   SubCutaneous three times a day before meals  insulin lispro Injectable (HumaLOG) 3 Unit(s) SubCutaneous three times a day before meals  losartan 50 milliGRAM(s) Oral daily  metoprolol succinate ER 25 milliGRAM(s) Oral daily  sodium chloride 0.9% lock flush 3 milliLiter(s) IV Push every 8 hours      LABS:  02-18    139  |  103  |  49<H>  ----------------------------<  118<H>  4.2   |  22  |  2.15<H>    Ca    8.3<L>      18 Feb 2018 06:05  Mg     2.1     02-18      Creatinine Trend: 2.15 <--, 2.16 <--, 2.01 <--                              11.6   4.53  )-----------( 264      ( 18 Feb 2018 06:00 )             37.3     Urine Studies:          HbA1c 6.8      [02-14-18 @ 22:56]  TSH 1.01      [02-14-18 @ 22:56]  Lipid: chol 196, , HDL 33,       [06-28-17 @ 10:12]        RADIOLOGY & ADDITIONAL STUDIES:

## 2018-02-18 NOTE — PROGRESS NOTE ADULT - SUBJECTIVE AND OBJECTIVE BOX
chief complaint : chest pain         SUBJECTIVE / OVERNIGHT EVENTS: pt denies chest pain, shortness of breath, nausea,v     MEDICATIONS  (STANDING):  aspirin enteric coated 81 milliGRAM(s) Oral daily  famotidine    Tablet 20 milliGRAM(s) Oral daily  furosemide   Injectable 40 milliGRAM(s) IV Push daily  heparin  Injectable 5000 Unit(s) SubCutaneous every 12 hours  influenza   Vaccine 0.5 milliLiter(s) IntraMuscular once  insulin glargine Injectable (LANTUS) 16 Unit(s) SubCutaneous every morning  insulin lispro (HumaLOG) corrective regimen sliding scale   SubCutaneous three times a day before meals  insulin lispro Injectable (HumaLOG) 4 Unit(s) SubCutaneous three times a day before meals  losartan 50 milliGRAM(s) Oral daily  metoprolol succinate ER 25 milliGRAM(s) Oral daily  sodium chloride 0.9% lock flush 3 milliLiter(s) IV Push every 8 hours    MEDICATIONS  (PRN):      Vital Signs Last 24 Hrs  T(C): 37.1 (18 Feb 2018 17:02), Max: 37.1 (18 Feb 2018 17:02)  T(F): 98.7 (18 Feb 2018 17:02), Max: 98.7 (18 Feb 2018 17:02)  HR: 82 (18 Feb 2018 17:02) (74 - 82)  BP: 150/90 (18 Feb 2018 17:02) (124/68 - 150/90)  BP(mean): --  RR: 18 (18 Feb 2018 17:02) (17 - 18)  SpO2: 100% (18 Feb 2018 11:28) (98% - 100%)  )  Constitutional: No fever, fatigue  Skin: No rash.  Eyes: No recent vision problems or eye pain.  ENT: No congestion, ear pain, or sore throat.  Cardiovascular: No chest pain or palpation.  Respiratory: No cough, shortness of breath, congestion, or wheezing.  Gastrointestinal: No abdominal pain, nausea, vomiting, or diarrhea.  Genitourinary: No dysuria.  Musculoskeletal: No joint swelling.  Neurologic: No headache.    PHYSICAL EXAM:  GENERAL: NAD  EYES: EOMI, PERRLA  NECK: Supple, No JVD  CHEST/LUNG: dec breath sounds at bases  HEART:  S1 , S2 +  ABDOMEN: soft, bs+  EXTREMITIES:  no edema  NEUROLOGY: alert awake calm cooperative      LABS:  02-18    139  |  103  |  49<H>  ----------------------------<  118<H>  4.2   |  22  |  2.15<H>    Ca    8.3<L>      18 Feb 2018 06:05  Mg     2.1     02-18      Creatinine Trend: 2.15 <--, 2.16 <--, 2.01 <--                        11.6   4.53  )-----------( 264      ( 18 Feb 2018 06:00 )             37.3     Urine Studies:

## 2018-02-18 NOTE — PROGRESS NOTE ADULT - SUBJECTIVE AND OBJECTIVE BOX
Chief Complaint:  DM 2    History: Patient denies complaints at time of follow up. Tolerating eating, no hypoglycemia noted.     MEDICATIONS  (STANDING):  aspirin enteric coated 81 milliGRAM(s) Oral daily  famotidine    Tablet 20 milliGRAM(s) Oral daily  furosemide   Injectable 40 milliGRAM(s) IV Push daily  heparin  Injectable 5000 Unit(s) SubCutaneous every 12 hours  influenza   Vaccine 0.5 milliLiter(s) IntraMuscular once  insulin glargine Injectable (LANTUS) 16 Unit(s) SubCutaneous every morning  insulin lispro (HumaLOG) corrective regimen sliding scale   SubCutaneous three times a day before meals  sodium chloride 0.9% lock flush 3 milliLiter(s) IV Push every 8 hours    MEDICATIONS  (PRN):      Allergies    ampicillin (Swelling)  penicillins (Swelling)        Review of Systems:  Constitutional: No fever  Cardiovascular: No chest pain, palpitations  Respiratory: No SOB, no cough  GI: No nausea, vomiting, abdominal pain    PHYSICAL EXAM:  Vital Signs Last 24 Hrs  T(C): 37 (18 Feb 2018 07:10), Max: 37.1 (17 Feb 2018 20:34)  T(F): 98.6 (18 Feb 2018 07:10), Max: 98.7 (17 Feb 2018 20:34)  HR: 80 (18 Feb 2018 07:10) (74 - 85)  BP: 133/70 (18 Feb 2018 07:10) (124/68 - 133/70)  BP(mean): --  RR: 18 (18 Feb 2018 07:10) (18 - 18)  SpO2: 99% (18 Feb 2018 07:10) (98% - 100%)  GENERAL: NAD  EYES: No proptosis, no lid lag, anicteric  HEENT:  Atraumatic, Normocephalic, moist mucous membranes  RESPIRATORY: Non labored  GI: Soft, nontender, non distended, normal bowel sounds  PSYCH: Alert and oriented x 3, normal affect, normal mood    CAPILLARY BLOOD GLUCOSE  POCT Blood Glucose.: 130 mg/dL (18 Feb 2018 08:32)  POCT Blood Glucose.: 225 mg/dL (17 Feb 2018 21:36)  POCT Blood Glucose.: 104 mg/dL (17 Feb 2018 17:17)  POCT Blood Glucose.: 266 mg/dL (17 Feb 2018 12:33)    POCT Blood Glucose.: 266 mg/dL (02-17-18 @ 12:33)  POCT Blood Glucose.: 189 mg/dL (02-17-18 @ 08:22)  POCT Blood Glucose.: 154 mg/dL (02-16-18 @ 21:45)  POCT Blood Glucose.: 232 mg/dL (02-16-18 @ 19:10)  POCT Blood Glucose.: 148 mg/dL (02-16-18 @ 16:36)  POCT Blood Glucose.: 229 mg/dL (02-16-18 @ 12:30)  POCT Blood Glucose.: 204 mg/dL (02-16-18 @ 09:13)  POCT Blood Glucose.: 236 mg/dL (02-15-18 @ 22:19)  POCT Blood Glucose.: 198 mg/dL (02-15-18 @ 17:36)  POCT Blood Glucose.: 220 mg/dL (02-15-18 @ 12:45)  POCT Blood Glucose.: 99 mg/dL (02-15-18 @ 05:57)  POCT Blood Glucose.: 142 mg/dL (02-15-18 @ 03:07)  POCT Blood Glucose.: 238 mg/dL (02-14-18 @ 21:47)  POCT Blood Glucose.: 161 mg/dL (02-14-18 @ 20:17)      02-17    138  |  101  |  43<H>  ----------------------------<  156<H>  4.4   |  20<L>  |  2.16<H>    EGFR if : 38  EGFR if non : 33    Ca    9.2      02-17  Mg     2.1     02-17    TPro  6.8  /  Alb  4.1  /  TBili  0.2  /  DBili  x   /  AST  20  /  ALT  21  /  AlkPhos  67  02-14          Thyroid Function Tests:  02-14 @ 22:56 TSH 1.01 FreeT4 -- T3 -- Anti TPO -- Anti Thyroglobulin Ab -- TSI --      Hemoglobin A1C, Whole Blood: 6.8 % <H> [4.0 - 5.6] (02-14-18 @ 22:56)

## 2018-02-18 NOTE — PROGRESS NOTE ADULT - SUBJECTIVE AND OBJECTIVE BOX
Cardiovascular Disease Progress Note    Overnight events: No acute events overnight. Patient denies chest pain or SOB.    Otherwise review of systems negative    Objective Findings:  T(C): 37 (18 @ 07:10), Max: 37.1 (18 @ 20:34)  HR: 80 (18 @ 07:10) (74 - 85)  BP: 133/70 (18 @ 07:10) (124/68 - 133/70)  RR: 18 (18 @ 07:10) (18 - 18)  SpO2: 99% (18 @ 07:10) (98% - 100%)  Wt(kg): --  Daily     Daily Weight in k.4 (2018 03:19)      Physical Exam:  Gen: NAD  HEENT: EOMI  CV: RRR, normal S1 + S2, no m/r/g  Lungs: CTAB  Abd: soft, non-tender  Ext: No edema    Telemetry: Sinus; no ectopy    Laboratory Data:                        11.6   4.53  )-----------( 264      ( 2018 06:00 )             37.3         139  |  103  |  49<H>  ----------------------------<  118<H>  4.2   |  22  |  2.15<H>    Ca    8.3<L>      2018 06:05  Mg     2.1           Inpatient Medications:  MEDICATIONS  (STANDING):  aspirin enteric coated 81 milliGRAM(s) Oral daily  famotidine    Tablet 20 milliGRAM(s) Oral daily  furosemide   Injectable 40 milliGRAM(s) IV Push daily  heparin  Injectable 5000 Unit(s) SubCutaneous every 12 hours  influenza   Vaccine 0.5 milliLiter(s) IntraMuscular once  insulin glargine Injectable (LANTUS) 16 Unit(s) SubCutaneous every morning  insulin lispro (HumaLOG) corrective regimen sliding scale   SubCutaneous three times a day before meals  insulin lispro Injectable (HumaLOG) 3 Unit(s) SubCutaneous three times a day before meals  metoprolol succinate ER 25 milliGRAM(s) Oral daily  sodium chloride 0.9% lock flush 3 milliLiter(s) IV Push every 8 hours      Assessment: 57 year old man with HTN and controlled IDDM presents with altered mental status and acute systolic CHF    #Acute systolic CHF-   Due to NICM  Cardiac cath performed 2018 reveals nonobstructive CAD, but LVEDP of 40 mmHg.  Continue IV Lasix diuresis today, as patient is fluid overloaded on exam.  Continue ASA and BB.  Nephrology input appreciated- will resume Losartan.    #Controlled IDDM-   Continue home dose Lantus.  Endo input noted.     #HTN- Controlled on current regimen.        Over 35 minutes spent on total encounter; more than 50% of the visit was spent counseling and/or coordinating care by the attending physician.      Waqar Lemus MD [facility]  Cardiovascular Disease  [phone]

## 2018-02-19 ENCOUNTER — TRANSCRIPTION ENCOUNTER (OUTPATIENT)
Age: 58
End: 2018-02-19

## 2018-02-19 VITALS
RESPIRATION RATE: 18 BRPM | DIASTOLIC BLOOD PRESSURE: 73 MMHG | TEMPERATURE: 98 F | OXYGEN SATURATION: 100 % | SYSTOLIC BLOOD PRESSURE: 139 MMHG | HEART RATE: 70 BPM

## 2018-02-19 LAB
BUN SERPL-MCNC: 49 MG/DL — HIGH (ref 7–23)
CALCIUM SERPL-MCNC: 8.7 MG/DL — SIGNIFICANT CHANGE UP (ref 8.4–10.5)
CHLORIDE SERPL-SCNC: 100 MMOL/L — SIGNIFICANT CHANGE UP (ref 98–107)
CO2 SERPL-SCNC: 21 MMOL/L — LOW (ref 22–31)
CREAT SERPL-MCNC: 2.29 MG/DL — HIGH (ref 0.5–1.3)
GLUCOSE SERPL-MCNC: 257 MG/DL — HIGH (ref 70–99)
HCT VFR BLD CALC: 38.3 % — LOW (ref 39–50)
HGB BLD-MCNC: 11.8 G/DL — LOW (ref 13–17)
MAGNESIUM SERPL-MCNC: 2.1 MG/DL — SIGNIFICANT CHANGE UP (ref 1.6–2.6)
MCHC RBC-ENTMCNC: 23.1 PG — LOW (ref 27–34)
MCHC RBC-ENTMCNC: 30.8 % — LOW (ref 32–36)
MCV RBC AUTO: 75 FL — LOW (ref 80–100)
NRBC # FLD: 0 — SIGNIFICANT CHANGE UP
PLATELET # BLD AUTO: 257 K/UL — SIGNIFICANT CHANGE UP (ref 150–400)
PMV BLD: 11 FL — SIGNIFICANT CHANGE UP (ref 7–13)
POTASSIUM SERPL-MCNC: 4.7 MMOL/L — SIGNIFICANT CHANGE UP (ref 3.5–5.3)
POTASSIUM SERPL-SCNC: 4.7 MMOL/L — SIGNIFICANT CHANGE UP (ref 3.5–5.3)
RBC # BLD: 5.11 M/UL — SIGNIFICANT CHANGE UP (ref 4.2–5.8)
RBC # FLD: 16.6 % — HIGH (ref 10.3–14.5)
SODIUM SERPL-SCNC: 136 MMOL/L — SIGNIFICANT CHANGE UP (ref 135–145)
T4 FREE SERPL-MCNC: 0.93 NG/DL — SIGNIFICANT CHANGE UP (ref 0.9–1.8)
WBC # BLD: 5.23 K/UL — SIGNIFICANT CHANGE UP (ref 3.8–10.5)
WBC # FLD AUTO: 5.23 K/UL — SIGNIFICANT CHANGE UP (ref 3.8–10.5)

## 2018-02-19 RX ORDER — INSULIN GLARGINE 100 [IU]/ML
16 INJECTION, SOLUTION SUBCUTANEOUS
Qty: 0 | Refills: 0 | COMMUNITY
Start: 2018-02-19

## 2018-02-19 RX ORDER — INSULIN LISPRO 100/ML
4 VIAL (ML) SUBCUTANEOUS
Qty: 1 | Refills: 0 | OUTPATIENT
Start: 2018-02-19

## 2018-02-19 RX ORDER — METFORMIN HYDROCHLORIDE 850 MG/1
1 TABLET ORAL
Qty: 0 | Refills: 0 | COMMUNITY

## 2018-02-19 RX ORDER — FUROSEMIDE 40 MG
40 TABLET ORAL
Qty: 0 | Refills: 0 | Status: DISCONTINUED | OUTPATIENT
Start: 2018-02-19 | End: 2018-02-19

## 2018-02-19 RX ORDER — FUROSEMIDE 40 MG
40 TABLET ORAL
Qty: 0 | Refills: 0 | Status: DISCONTINUED | OUTPATIENT
Start: 2018-02-20 | End: 2018-02-19

## 2018-02-19 RX ORDER — LOSARTAN POTASSIUM 100 MG/1
1 TABLET, FILM COATED ORAL
Qty: 0 | Refills: 0 | COMMUNITY

## 2018-02-19 RX ORDER — INSULIN GLARGINE 100 [IU]/ML
20 INJECTION, SOLUTION SUBCUTANEOUS
Qty: 0 | Refills: 0 | COMMUNITY
Start: 2018-02-19

## 2018-02-19 RX ORDER — LOSARTAN POTASSIUM 100 MG/1
1 TABLET, FILM COATED ORAL
Qty: 30 | Refills: 0 | OUTPATIENT
Start: 2018-02-19 | End: 2018-03-20

## 2018-02-19 RX ORDER — FAMOTIDINE 10 MG/ML
1 INJECTION INTRAVENOUS
Qty: 30 | Refills: 0 | OUTPATIENT
Start: 2018-02-19 | End: 2018-03-20

## 2018-02-19 RX ORDER — METOPROLOL TARTRATE 50 MG
1 TABLET ORAL
Qty: 30 | Refills: 0 | OUTPATIENT
Start: 2018-02-19 | End: 2018-03-20

## 2018-02-19 RX ADMIN — INFLUENZA VIRUS VACCINE 0.5 MILLILITER(S): 15; 15; 15; 15 SUSPENSION INTRAMUSCULAR at 16:42

## 2018-02-19 RX ADMIN — INSULIN GLARGINE 16 UNIT(S): 100 INJECTION, SOLUTION SUBCUTANEOUS at 08:34

## 2018-02-19 RX ADMIN — Medication 40 MILLIGRAM(S): at 05:00

## 2018-02-19 RX ADMIN — Medication 4 UNIT(S): at 12:42

## 2018-02-19 RX ADMIN — Medication 3: at 08:33

## 2018-02-19 RX ADMIN — SODIUM CHLORIDE 3 MILLILITER(S): 9 INJECTION INTRAMUSCULAR; INTRAVENOUS; SUBCUTANEOUS at 05:04

## 2018-02-19 RX ADMIN — Medication 4 UNIT(S): at 08:33

## 2018-02-19 RX ADMIN — Medication 81 MILLIGRAM(S): at 12:42

## 2018-02-19 RX ADMIN — HEPARIN SODIUM 5000 UNIT(S): 5000 INJECTION INTRAVENOUS; SUBCUTANEOUS at 05:00

## 2018-02-19 RX ADMIN — LOSARTAN POTASSIUM 50 MILLIGRAM(S): 100 TABLET, FILM COATED ORAL at 05:00

## 2018-02-19 RX ADMIN — Medication 25 MILLIGRAM(S): at 05:00

## 2018-02-19 RX ADMIN — SODIUM CHLORIDE 3 MILLILITER(S): 9 INJECTION INTRAMUSCULAR; INTRAVENOUS; SUBCUTANEOUS at 14:07

## 2018-02-19 RX ADMIN — FAMOTIDINE 20 MILLIGRAM(S): 10 INJECTION INTRAVENOUS at 12:42

## 2018-02-19 NOTE — PROGRESS NOTE ADULT - PROBLEM SELECTOR PROBLEM 1
CKD (chronic kidney disease) stage 3, GFR 30-59 ml/min
Diabetes mellitus, type 2
Diabetes mellitus, type 2
Type 2 diabetes mellitus with hypoglycemia without coma, with long-term current use of insulin
Diabetes mellitus, type 2

## 2018-02-19 NOTE — DISCHARGE NOTE ADULT - CARE PLAN
Principal Discharge DX:	CAD (coronary artery disease)  Goal:	prevent worsening of disease  Assessment and plan of treatment:	Continue aspirin. Started metoprolol, statin, cozaar  follow up Dr Lemus on 3/16/2018 at 4:30 pm.  Secondary Diagnosis:	CKD (chronic kidney disease) stage 3, GFR 30-59 ml/min  Assessment and plan of treatment:	follow up with Dr Cuevas in one week  Secondary Diagnosis:	Hypertension, unspecified type  Assessment and plan of treatment:	1800 calorie diabetic diet/ low salt, low fat , low cholesterol  Secondary Diagnosis:	Diabetes mellitus, type 2  Assessment and plan of treatment:	continue lantus and metformin  follow up with your endocrinologist in 4 weeks  Secondary Diagnosis:	Abnormal TSH  Assessment and plan of treatment:	follow up with your endocrinologist in 4 weeks for repeat thyroid function testing  Secondary Diagnosis:	CHF (congestive heart failure)  Assessment and plan of treatment:	follow up with Dr Lemus  low salt, low fat, low cholesterol  continue lasix 40 BID starting 2/20 and cozaar, metoprolol

## 2018-02-19 NOTE — PROGRESS NOTE ADULT - PROBLEM SELECTOR PROBLEM 2
ACS (acute coronary syndrome)
ACS (acute coronary syndrome)
Abnormal TSH
Abnormal TSH
Hypertension, unspecified type
Abnormal TSH
ACS (acute coronary syndrome)

## 2018-02-19 NOTE — DISCHARGE NOTE ADULT - MEDICATION SUMMARY - MEDICATIONS TO TAKE
I will START or STAY ON the medications listed below when I get home from the hospital:    BD ultra fine needels to be used with Insulin pen 8mm  one month supply ( use three times daily before meals with humalog)  -- Indication: For Diabetes mellitus, type 2    aspirin 81 mg oral delayed release tablet  -- 1 tab(s) by mouth once a day  -- Indication: For CAD (coronary artery disease)    Cozaar 50 mg oral tablet  -- 1 tab(s) by mouth once a day  -- Indication: For CAD (coronary artery disease)    metFORMIN 1000 mg oral tablet, extended release  -- 1 tab(s) by mouth once a day do not take until 2/21/2018  -- Indication: For Diabetes mellitus, type 2    insulin glargine  -- 16 unit(s) subcutaneous once a day (at bedtime)  -- Indication: For Diabetes mellitus, type 2    HumaLOG KwikPen 100 units/mL injectable solution  -- 4 unit(s) injectable 3 times a day (before meals)  one months supply  -- Indication: For Diabetes mellitus, type 2    Metoprolol Succinate ER 25 mg oral tablet, extended release  -- 1 tab(s) by mouth once a day  -- Indication: For CAD (coronary artery disease)    Lasix 40 mg oral tablet  -- 1 tab(s) by mouth 2 times a day start 2/20/2018  -- Indication: For CHF     Pepcid 20 mg oral tablet  -- 1 tab(s) by mouth once a day  -- Indication: For GERD I will START or STAY ON the medications listed below when I get home from the hospital:    aspirin 81 mg oral delayed release tablet  -- 1 tab(s) by mouth once a day  -- Indication: For CAD (coronary artery disease)    Cozaar 50 mg oral tablet  -- 1 tab(s) by mouth once a day  -- Indication: For CAD (coronary artery disease)    insulin glargine  -- 20 unit(s) subcutaneous once a day (at bedtime)  -- Indication: For Diabetes mellitus, type 2    Metoprolol Succinate ER 25 mg oral tablet, extended release  -- 1 tab(s) by mouth once a day  -- Indication: For CAD (coronary artery disease)    Lasix 40 mg oral tablet  -- 1 tab(s) by mouth 2 times a day start 2/20/2018  -- Indication: For CHF     Pepcid 20 mg oral tablet  -- 1 tab(s) by mouth once a day  -- Indication: For GERD

## 2018-02-19 NOTE — PROGRESS NOTE ADULT - PROBLEM SELECTOR PLAN 2
BP stable  Monitor BP  continue current meds  Low salt diet   Pt was on losartan 100 mg as per my office record
Cardiac cath performed 2/16/2018 reveals nonobstructive CAD, but LVEDP of 40 mmHg.  Continue IV Lasix diuresis today.  Continue ASA and BB.
Cardiac cath performed 2/16/2018 reveals nonobstructive CAD, but LVEDP of 40 mmHg. diuresis changed to po   Continue ASA and BB.  diuresis on hold, pt appears euvolemic
Optimal  Monitor BP. On losartan 50 mg daily  Low salt diet
Optimal  Monitor BP. On losartan 50 mg daily  Pt was on losartan 100 mg as per my office record  Low salt diet
Would check Free T4 now that slightly high, but usually would recommend not to check inpatient unless strong clinical suspicion for thyorid d/o.  F/u TFT's outpt in 6-8 weeks with PCP if his FT4 is normal.
Would check Free T4 now that slightly high, but usually would recommend not to check inpatient unless strong clinical suspicion for thyorid d/o.  F/u TFT's outpt in 6-8 weeks with PCP if his FT4 is normal.
Unclear why this was checked.  Would check Free T4 now that slightly high, but usually would recommend not to check inpatient unless strong clinical suspicion for thyorid d/o.  F/u TFT's outpt in 6-8 weeks with PCP if his FT4 is normal.
Cardiac cath performed 2/16/2018 reveals nonobstructive CAD, but LVEDP of 40 mmHg.  Continue IV Lasix diuresis today.  Continue ASA and BB.

## 2018-02-19 NOTE — PROGRESS NOTE ADULT - PROBLEM SELECTOR PLAN 1
Baseline Scr 2.0  Renal function fluctuating but relatively stable   follow up with dr. Cuevas in office in 2 weeks after discharge
Here, would start on 16 units lantus daily and continue low SSI achs.  If sugars still high on this, would add 5 units of humalog before meals.     On discharge, HOLD metformin at d/c given renal function.  F/u with PMD for ongoing creatinine monitoring.  Would switch from 15 bid lantus at home to 24 units ONCE a day at d/c.
Patient is known to me from office  Baseline Scr 2.0  Renal function is stable  Hold lasix x1 day if SCr continues to increase  s/p cardiac cath
Patient is known to me from office  Baseline Scr 2.0  Renal function is stable  s/p cardiac cath
Would continue on 16 units lantus daily as fasting FS was acceptable (Target 100-180) and continue low SSI achs.  FS is elevated at lunch, appears that some pre-meal Humalog would be indicated   added 3 units of humalog before meals while inpatient (HOLD if NPO)    On discharge, HOLD metformin at d/c given renal function.  F/u with PMD for ongoing creatinine monitoring.  Would switch from 15 bid lantus at home to 20 units ONCE a day at d/c. Explained to patient that this is a change from from regimen and that close FS monitoring is needed. Also explained that Lantus has been given in the AM so he should continue to take it in the AM ONLY. He verbalizes understanding and will follow up with his PMD upon DC.
Would continue on 16 units lantus daily as fasting FS was acceptable (Target 100-180) and continue low SSI achs.  increase humalog to 4 units sq tid ac    On discharge, HOLD metformin at d/c given renal function.  F/u with PMD for ongoing creatinine/gfr monitoring.  Would switch from 15 bid lantus at home to 20 units ONCE a day at d/c. Explained to patient that this is a change from from regimen and that close FS monitoring is needed. Also explained that Lantus has been given in the AM so he should continue to take it in the AM ONLY. He verbalizes understanding and will follow up with his PMD upon DC.
as per endo, continue on 16 units lantus daily as fasting FS was acceptable (Target 100-180) and continue low SSI achs.  FS is elevated at lunch, appears that some pre-meal Humalog would be indicated   added 3 units of humalog before meals while inpatient (HOLD if NPO)  no metformin on dc
iss, cont current insulin regimen as per endo
iss, cont current insulin regimen as per endo

## 2018-02-19 NOTE — DISCHARGE NOTE ADULT - NS AS ACTIVITY OBS
Do not make important decisions/Do not drive or operate machinery/No Heavy lifting/straining/Walking-Indoors allowed/Showering allowed

## 2018-02-19 NOTE — PROGRESS NOTE ADULT - PROBLEM SELECTOR PLAN 3
arb on hold   check UA for proteinuria  renal f/u , k is nl
arb on hold   check UA for proteinuria  renal f/u , k is nl
arb restarted   f/u renal fx closely
s/p cardiac cath  Follow up cardiology.

## 2018-02-19 NOTE — DISCHARGE NOTE ADULT - MEDICATION SUMMARY - MEDICATIONS TO CHANGE
I will SWITCH the dose or number of times a day I take the medications listed below when I get home from the hospital:    Lantus Solostar Pen 100 units/mL subcutaneous solution  -- 15 unit(s) subcutaneous 2 times a day   once in morning and once in evening  -- Do not drink alcoholic beverages when taking this medication.  It is very important that you take or use this exactly as directed.  Do not skip doses or discontinue unless directed by your doctor.  Keep in refrigerator.  Do not freeze.

## 2018-02-19 NOTE — DISCHARGE NOTE ADULT - SECONDARY DIAGNOSIS.
CKD (chronic kidney disease) stage 3, GFR 30-59 ml/min Hypertension, unspecified type Diabetes mellitus, type 2 Abnormal TSH CHF (congestive heart failure)

## 2018-02-19 NOTE — DISCHARGE NOTE ADULT - HOSPITAL COURSE
57M admitted for hypoglycemia, chest pain with positive ST. s/p cath with lumina irregularities stable for discharge as per Dr Lemus.       NST-:Abnormal Study  * Myocardial Perfusion SPECT results are abnormal.  * Review of raw data shows: The study is of adequate technical quality  * The left ventricle was enlarged. There are medium sized, moderate defects in inferior and inferolateral walls that are reversible, suggestive of ischemia.Transient ischemic dilation of the LV was noted with a ratio of: 1.2  * Rest gated wall motion analysis was performed (LVEF = 44 %;LVEDV = 160 ml.), revealing moderately reduced  LV function.  Post-stress gated wall motion analysis was performed (LVEF = 47 %;LVEDV = 197 ml.), revealing mild overall hypokinesis. There was severe inferior and inferolateral hypokinesis with moderately diminished systolic thickening. The remaining segments appeared mildly hypcontracitile. RV function appeared normal.    NSR 79b/ min, LAE, LAFB, TWI = 1, AVL, V5 V6, QT/ QTC= 404/ 463  CE negative x 1  H & H = 11.6/ 36.6  BUN/ creatinine= 32/ 2.01  Glucose = 240  Per ENDOCRINE= Goal -180 while inpatient. Plan discharge on Lantus 24 U once a day ad Humalog 6 U before meal, provided patient should not Humalog if he miss a meal.   2/16 Renal U/S: No hydronephrosis of either kidney.  2/16 LHC: luminal irregularities, LVDP 40, RRA accessed     consults  2/15/17 - ENDOCRINE-57y old  Male with PMH of Htn, HLD, Dm type 2 diagnosed 12 years ago and CAD s/p cardiac stents presented to the ED because of low FG of 20 at home, associated with weakness, lethargy and confusion.   Hypoglycemia. Recommendation: Patient presented with symptomatic hypoglycemia with FG in 20. FG improved with PO intake: classic Whipple triad.  Hypoglycemia likely secondary to poor PO intake and Lantus/Metformin in setting of JORGITO. JORGITO delays excretion of Insulin, can potentiate affect of Insulin.   FG are WNL while inpatient. Continue to monitor FG inpatient  Goal -180 while inpatient.  Type 2 diabetes mellitus with hypoglycemia without coma, with long-term current use of insulin.  Recommendation: Patient with h/o Typer 2 DM is on Lantus BID and metformin at home.   Continue to hold PO medication, monitor Accu-Cheks and continue sliding scale to measure insulin requirement.   Since patient has JORGITO, advise to discontinue metformin at this point on discharge. Patient should have follow up BMP in PCP office to assess JORGITO is improving or not.   Patient agrees to take Lantus and Humalog 4 shots in a day; will likely plan discharge on weight based Insulin.   Goal -180 while inpatient.   Goal HbA1c <7, given no hypoglycemic episodes. If patient continues to have hypoglycemia, can plan higher HbA1c.  Plan discharge on Lantus 24 U once a day ad Humalog 6 U before meal, provided patient should not Humalog if he miss a meal.   JORGITO (acute kidney injury).  Recommendation: Management of JORGITO as per primary team. Discontinue metformin for now given JORGITO.   Please reassess need of losartan in setting of JORGITO.    2/16 Endo: Type 2 diabetes mellitus with hypoglycemia without coma, with long-term current use of insulin.  Plan: Here, would start on 16 units lantus daily and continue low SSI achs.  If sugars still high on this, would add 5 units of humalog before meals.   On discharge, HOLD metformin at d/c given renal function.  F/u with PMD for ongoing creatinine monitoring.  Would switch from 15 bid lantus at home to 24 units ONCE a day at d/c.   Abnormal TSH.  Plan: Unclear why this was checked.  Would check Free T4 now that slightly high, but usually would recommend not to check inpatient unless strong clinical suspicion for thyorid d/o.  F/u TFT's outpt in 6-8 weeks with PCP if his FT4 is normal.   2/18 Cardio: Continue IV Lasix diuresis today, as patient is fluid overloaded on exam.Continue ASA and BB.will resume Losartan.  2/18 Endo: continue lantus 16. increase humalog to 4 units sq tid ac.  check free T4  2/18 Renal: Hold lasix x1 day if SCr continues to increase 57M admitted for hypoglycemia, chest pain with positive ST. s/p cath with lumina irregularities stable for discharge as per Dr Lemus.       NST-:Abnormal Study  * Myocardial Perfusion SPECT results are abnormal.  * Review of raw data shows: The study is of adequate technical quality  * The left ventricle was enlarged. There are medium sized, moderate defects in inferior and inferolateral walls that are reversible, suggestive of ischemia.Transient ischemic dilation of the LV was noted with a ratio of: 1.2  * Rest gated wall motion analysis was performed (LVEF = 44 %;LVEDV = 160 ml.), revealing moderately reduced  LV function.  Post-stress gated wall motion analysis was performed (LVEF = 47 %;LVEDV = 197 ml.), revealing mild overall hypokinesis. There was severe inferior and inferolateral hypokinesis with moderately diminished systolic thickening. The remaining segments appeared mildly hypcontracitile. RV function appeared normal.    NSR 79b/ min, LAE, LAFB, TWI = 1, AVL, V5 V6, QT/ QTC= 404/ 463  CE negative x 1  H & H = 11.6/ 36.6  BUN/ creatinine= 32/ 2.01  Glucose = 240  Per ENDOCRINE= Goal -180 while inpatient. Plan discharge on Lantus 24 U once a day ad Humalog 6 U before meal, provided patient should not Humalog if he miss a meal.   2/16 Renal U/S: No hydronephrosis of either kidney.  2/16 LHC: luminal irregularities, LVDP 40, RRA accessed     consults  2/15/17 - ENDOCRINE-57y old  Male with PMH of Htn, HLD, Dm type 2 diagnosed 12 years ago and CAD s/p cardiac stents presented to the ED because of low FG of 20 at home, associated with weakness, lethargy and confusion.   Hypoglycemia. Recommendation: Patient presented with symptomatic hypoglycemia with FG in 20. FG improved with PO intake: classic Whipple triad.  Hypoglycemia likely secondary to poor PO intake and Lantus/Metformin in setting of JORGITO. JORGITO delays excretion of Insulin, can potentiate affect of Insulin.   FG are WNL while inpatient. Continue to monitor FG inpatient  Goal -180 while inpatient.  Type 2 diabetes mellitus with hypoglycemia without coma, with long-term current use of insulin.  Recommendation: Patient with h/o Typer 2 DM is on Lantus BID and metformin at home.   Continue to hold PO medication, monitor Accu-Cheks and continue sliding scale to measure insulin requirement.   Since patient has JORGITO, advise to discontinue metformin at this point on discharge. Patient should have follow up BMP in PCP office to assess JORGITO is improving or not.   Patient agrees to take Lantus and Humalog 4 shots in a day; will likely plan discharge on weight based Insulin.   Goal -180 while inpatient.   Goal HbA1c <7, given no hypoglycemic episodes. If patient continues to have hypoglycemia, can plan higher HbA1c.  Plan discharge on Lantus 24 U once a day ad Humalog 6 U before meal, provided patient should not Humalog if he miss a meal.   JORGITO (acute kidney injury).  Recommendation: Management of JORGITO as per primary team. Discontinue metformin for now given JORGITO.   Please reassess need of losartan in setting of JORGITO.    2/16 Endo: Type 2 diabetes mellitus with hypoglycemia without coma, with long-term current use of insulin.  Plan: Here, would start on 16 units lantus daily and continue low SSI achs.  If sugars still high on this, would add 5 units of humalog before meals.   On discharge, HOLD metformin at d/c given renal function.  F/u with PMD for ongoing creatinine monitoring.  Would switch from 15 bid lantus at home to 24 units ONCE a day at d/c.   Abnormal TSH.  Plan: Unclear why this was checked.  Would check Free T4 now that slightly high, but usually would recommend not to check inpatient unless strong clinical suspicion for thyorid d/o.  F/u TFT's outpt in 6-8 weeks with PCP if his FT4 is normal.   2/18 Cardio: Continue IV Lasix diuresis today, as patient is fluid overloaded on exam.Continue ASA and BB.will resume Losartan.  2/18 Endo: continue lantus 16. increase humalog to 4 units sq tid ac.  check free T4  2/18 Renal: Hold lasix x1 day if SCr continues to increase    Patient clinically improved after IV diiuresis.  He was started on optimal medical therapy.  He will follow up with Dr. Lemus 3/16 at 4:30 pm.

## 2018-02-19 NOTE — DISCHARGE NOTE ADULT - INSTRUCTIONS
No driving x 24 hours. No strenuous activity for three weeks. Monitor site of procedure and notify your doctor for any  bleeding / swelling/redness/ discharge/pain. You may shower but no baths or swimming x one week. No heavy lifting x 1 week.

## 2018-02-19 NOTE — DISCHARGE NOTE ADULT - ADDITIONAL INSTRUCTIONS
Follow up with dr Lemus on 3/16/2018 at 4:30 pm.  Follow up with Dr Cuevas in 2 weeks renal  Follow up with your endocrinologist in 6-8 weeks for repeat thyroid function testing

## 2018-02-19 NOTE — PROGRESS NOTE ADULT - PROBLEM SELECTOR PROBLEM 3
CAD (coronary artery disease)
JORGITO (acute kidney injury)

## 2018-02-19 NOTE — PROGRESS NOTE ADULT - PROVIDER SPECIALTY LIST ADULT
Cardiology
Endocrinology
Internal Medicine
Internal Medicine
Nephrology
Internal Medicine

## 2018-02-19 NOTE — DISCHARGE NOTE ADULT - PATIENT PORTAL LINK FT
You can access the [facility] Patient Portal, offered by [facility], by registering with the following website: [email or web address]

## 2018-02-19 NOTE — PROGRESS NOTE ADULT - SUBJECTIVE AND OBJECTIVE BOX
chief complaint : chest pain         SUBJECTIVE / OVERNIGHT EVENTS: pt denies chest pain, shortness of breath, nausea,v     MEDICATIONS  (STANDING):  aspirin enteric coated 81 milliGRAM(s) Oral daily  famotidine    Tablet 20 milliGRAM(s) Oral daily  heparin  Injectable 5000 Unit(s) SubCutaneous every 12 hours  influenza   Vaccine 0.5 milliLiter(s) IntraMuscular once  insulin glargine Injectable (LANTUS) 16 Unit(s) SubCutaneous every morning  insulin lispro (HumaLOG) corrective regimen sliding scale   SubCutaneous three times a day before meals  insulin lispro Injectable (HumaLOG) 4 Unit(s) SubCutaneous three times a day before meals  losartan 50 milliGRAM(s) Oral daily  metoprolol succinate ER 25 milliGRAM(s) Oral daily  sodium chloride 0.9% lock flush 3 milliLiter(s) IV Push every 8 hours    MEDICATIONS  (PRN):      Vital Signs Last 24 Hrs  T(C): 36.6 (19 Feb 2018 12:55), Max: 37.1 (18 Feb 2018 17:02)  T(F): 97.9 (19 Feb 2018 12:55), Max: 98.7 (18 Feb 2018 17:02)  HR: 70 (19 Feb 2018 12:55) (70 - 82)  BP: 139/73 (19 Feb 2018 12:55) (134/83 - 150/90)  BP(mean): --  RR: 18 (19 Feb 2018 12:55) (17 - 18)  SpO2: 100% (19 Feb 2018 12:55) (100% - 100%)  )  Constitutional: No fever, fatigue  Skin: No rash.  Eyes: No recent vision problems or eye pain.  ENT: No congestion, ear pain, or sore throat.  Cardiovascular: No chest pain or palpation.  Respiratory: No cough, shortness of breath, congestion, or wheezing.  Gastrointestinal: No abdominal pain, nausea, vomiting, or diarrhea.  Genitourinary: No dysuria.  Musculoskeletal: No joint swelling.  Neurologic: No headache.    PHYSICAL EXAM:  GENERAL: NAD  EYES: EOMI, PERRLA  NECK: Supple, No JVD  CHEST/LUNG: dec breath sounds at bases  HEART:  S1 , S2 +  ABDOMEN: soft, bs+  EXTREMITIES:  no edema  NEUROLOGY: alert awake calm cooperative      LABS:  02-19    136  |  100  |  49<H>  ----------------------------<  257<H>  4.7   |  21<L>  |  2.29<H>    Ca    8.7      19 Feb 2018 06:37  Mg     2.1     02-19      Creatinine Trend: 2.29 <--, 2.15 <--, 2.16 <--, 2.01 <--                        11.8   5.23  )-----------( 257      ( 19 Feb 2018 06:37 )             38.3     Urine Studies:

## 2018-02-19 NOTE — DISCHARGE NOTE ADULT - MEDICATION SUMMARY - MEDICATIONS TO STOP TAKING
I will STOP taking the medications listed below when I get home from the hospital:    amLODIPine 10 mg oral tablet  -- 1 tab(s) by mouth once a day    Hyzaar 50 mg-12.5 mg oral tablet  -- 1 tab(s) by mouth once a day I will STOP taking the medications listed below when I get home from the hospital:    amLODIPine 10 mg oral tablet  -- 1 tab(s) by mouth once a day    Hyzaar 50 mg-12.5 mg oral tablet  -- 1 tab(s) by mouth once a day    metFORMIN 1000 mg oral tablet, extended release  -- 1 tab(s) by mouth once a day

## 2018-02-19 NOTE — PROGRESS NOTE ADULT - SUBJECTIVE AND OBJECTIVE BOX
Dr. Cuevas (Nephrology)  Office [phone]  Cell [phone]  Yanique LEONARD  Cell [phone]      Patient is a 57y old  Male who presents with a chief complaint of Chest pain (15 Feb 2018 22:38)      Patient seen and examined at bedside. No chest pain/sob    VITALS:  T(F): 98 (02-19-18 @ 04:56), Max: 98.7 (02-18-18 @ 17:02)  HR: 73 (02-19-18 @ 04:56)  BP: 134/83 (02-19-18 @ 04:56)  RR: 17 (02-19-18 @ 04:56)  SpO2: 100% (02-19-18 @ 04:56)  Wt(kg): --    02-18 @ 07:01  -  02-19 @ 07:00  --------------------------------------------------------  IN: 950 mL / OUT: 1295 mL / NET: -345 mL          PHYSICAL EXAM:  Constitutional: NAD  Neck: No JVD  Respiratory: CTAB, no wheezes, rales or rhonchi  Cardiovascular: S1, S2, RRR  Gastrointestinal: BS+, soft, NT/ND  Extremities: No peripheral edema    Hospital Medications:   MEDICATIONS  (STANDING):  aspirin enteric coated 81 milliGRAM(s) Oral daily  famotidine    Tablet 20 milliGRAM(s) Oral daily  heparin  Injectable 5000 Unit(s) SubCutaneous every 12 hours  influenza   Vaccine 0.5 milliLiter(s) IntraMuscular once  insulin glargine Injectable (LANTUS) 16 Unit(s) SubCutaneous every morning  insulin lispro (HumaLOG) corrective regimen sliding scale   SubCutaneous three times a day before meals  insulin lispro Injectable (HumaLOG) 4 Unit(s) SubCutaneous three times a day before meals  losartan 50 milliGRAM(s) Oral daily  metoprolol succinate ER 25 milliGRAM(s) Oral daily  sodium chloride 0.9% lock flush 3 milliLiter(s) IV Push every 8 hours      LABS:  02-19    136  |  100  |  49<H>  ----------------------------<  257<H>  4.7   |  21<L>  |  2.29<H>    Ca    8.7      19 Feb 2018 06:37  Mg     2.1     02-19      Creatinine Trend: 2.29 <--, 2.15 <--, 2.16 <--, 2.01 <--                                11.8   5.23  )-----------( 257      ( 19 Feb 2018 06:37 )             38.3     Urine Studies:      HbA1c 6.8      [02-14-18 @ 22:56]  TSH 1.01      [02-14-18 @ 22:56]  Lipid: chol 196, , HDL 33,       [06-28-17 @ 10:12]        RADIOLOGY & ADDITIONAL STUDIES:

## 2018-02-19 NOTE — DISCHARGE NOTE ADULT - PLAN OF CARE
follow up with Dr Cuevas in one week 1800 calorie diabetic diet/ low salt, low fat , low cholesterol continue lantus and metformin  follow up with your endocrinologist in 4 weeks follow up with your endocrinologist in 4 weeks for repeat thyroid function testing follow up with Dr Lemus  low salt, low fat, low cholesterol  continue lasix 40 BID starting 2/20 and cozaar, metoprolol prevent worsening of disease Continue aspirin. Started metoprolol, statin, cozaar  follow up Dr Lemus on 3/16/2018 at 4:30 pm.

## 2018-02-19 NOTE — PROGRESS NOTE ADULT - ASSESSMENT
57M admitted for chest pain, positive stress test, likely C.C. 2/16 and Hypoglycemia.
57M admitted for chest pain, positive stress test, likely C.C. 2/16 and Hypoglycemia.
57m with a history of insulin dependent DMII, HTN, Dyslipidemia, CKD, CAD with stents 8 years ago, came with hypoglycemia. Has h/o exertional SOB, stress test is positive. s/p  Cardiac cath
57y old  Male with PMH of Htn, HLD, Dm type 2 diagnosed 12 years ago and CAD s/p cardiac stents presented to the ED because of low FG of 20 at home, associated with weakness, lethargy and confusion.
57M admitted for chest pain, positive stress test, likely C.C. 2/16 and Hypoglycemia.

## 2018-02-19 NOTE — PROGRESS NOTE ADULT - SUBJECTIVE AND OBJECTIVE BOX
Cardiovascular Disease Progress Note    Overnight events: No acute events overnight. Mr. Schmitt denies chest pain or SOB.   Otherwise review of systems negative    Objective Findings:  T(C): 36.7 (18 @ 04:56), Max: 37.1 (18 @ 17:02)  HR: 73 (18 @ 04:56) (73 - 82)  BP: 134/83 (18 @ 04:56) (134/83 - 150/90)  RR: 17 (18 @ 04:56) (17 - 18)  SpO2: 100% (18 @ 04:56) (100% - 100%)  Wt(kg): --  Daily     Daily Weight in k.1 (2018 04:56)      Physical Exam:  Gen: NAD  HEENT: EOMI  CV: RRR, normal S1 + S2, no m/r/g  Lungs: CTAB  Abd: soft, non-tender  Ext: No edema    Telemetry: Sinus; no ectopy    Laboratory Data:                        11.8   5.23  )-----------( 257      ( 2018 06:37 )             38.3     -    136  |  100  |  49<H>  ----------------------------<  257<H>  4.7   |  21<L>  |  2.29<H>    Ca    8.7      2018 06:37  Mg     2.1                     Inpatient Medications:  MEDICATIONS  (STANDING):  aspirin enteric coated 81 milliGRAM(s) Oral daily  famotidine    Tablet 20 milliGRAM(s) Oral daily  heparin  Injectable 5000 Unit(s) SubCutaneous every 12 hours  influenza   Vaccine 0.5 milliLiter(s) IntraMuscular once  insulin glargine Injectable (LANTUS) 16 Unit(s) SubCutaneous every morning  insulin lispro (HumaLOG) corrective regimen sliding scale   SubCutaneous three times a day before meals  insulin lispro Injectable (HumaLOG) 4 Unit(s) SubCutaneous three times a day before meals  losartan 50 milliGRAM(s) Oral daily  metoprolol succinate ER 25 milliGRAM(s) Oral daily  sodium chloride 0.9% lock flush 3 milliLiter(s) IV Push every 8 hours      Assessment: 57 year old man with HTN and controlled IDDM presents with altered mental status and acute systolic CHF    #Acute systolic CHF-   Due to NICM  Cardiac cath performed 2018 reveals nonobstructive CAD, but LVEDP of 40 mmHg.  Patient clinically improved.  Start Lasix 40 mg PO BID starting 2018.  Continue ASA and BB.  Nephrology input appreciated.    #Controlled IDDM-   Endo input noted.     #HTN- Controlled on current regimen.      Discharge planning today.  Mr. Schmitt will follow up with me in the office.     Over 35 minutes spent on total encounter; more than 50% of the visit was spent counseling and/or coordinating care by the attending physician.      Waqar Lemus MD [facility]  Cardiovascular Disease  [phone] Cardiovascular Disease Progress Note    Overnight events: No acute events overnight. Mr. Schmitt denies chest pain or SOB.   Otherwise review of systems negative    Objective Findings:  T(C): 36.7 (18 @ 04:56), Max: 37.1 (18 @ 17:02)  HR: 73 (18 @ 04:56) (73 - 82)  BP: 134/83 (18 @ 04:56) (134/83 - 150/90)  RR: 17 (18 @ 04:56) (17 - 18)  SpO2: 100% (18 @ 04:56) (100% - 100%)  Wt(kg): --  Daily     Daily Weight in k.1 (2018 04:56)      Physical Exam:  Gen: NAD  HEENT: EOMI  CV: RRR, normal S1 + S2, no m/r/g  Lungs: CTAB  Abd: soft, non-tender  Ext: No edema    Telemetry: Sinus; no ectopy    Laboratory Data:                        11.8   5.23  )-----------( 257      ( 2018 06:37 )             38.3     -    136  |  100  |  49<H>  ----------------------------<  257<H>  4.7   |  21<L>  |  2.29<H>    Ca    8.7      2018 06:37  Mg     2.1                     Inpatient Medications:  MEDICATIONS  (STANDING):  aspirin enteric coated 81 milliGRAM(s) Oral daily  famotidine    Tablet 20 milliGRAM(s) Oral daily  heparin  Injectable 5000 Unit(s) SubCutaneous every 12 hours  influenza   Vaccine 0.5 milliLiter(s) IntraMuscular once  insulin glargine Injectable (LANTUS) 16 Unit(s) SubCutaneous every morning  insulin lispro (HumaLOG) corrective regimen sliding scale   SubCutaneous three times a day before meals  insulin lispro Injectable (HumaLOG) 4 Unit(s) SubCutaneous three times a day before meals  losartan 50 milliGRAM(s) Oral daily  metoprolol succinate ER 25 milliGRAM(s) Oral daily  sodium chloride 0.9% lock flush 3 milliLiter(s) IV Push every 8 hours      Assessment: 57 year old man with HTN and controlled IDDM presents with altered mental status and acute systolic CHF    #Acute systolic CHF-   Due to NICM  Cardiac cath performed 2018 reveals nonobstructive CAD, but LVEDP of 40 mmHg.  Patient clinically improved.  Start Lasix 40 mg PO BID starting 2018.  Continue ASA and BB.  Nephrology input appreciated.    #Controlled IDDM-   Endo input noted.     #HTN- Controlled on current regimen.      Discharge planning today.  Mr. Schmitt will follow up with me in the office on 3/16/2018 at 4:30 pm.    [address] [place] [address]  [phone]     Over 35 minutes spent on total encounter; more than 50% of the visit was spent counseling and/or coordinating care by the attending physician.      Waqar Lemus MD [facility]  Cardiovascular Disease  [phone]

## 2018-02-20 RX ORDER — FUROSEMIDE 40 MG
1 TABLET ORAL
Qty: 60 | Refills: 0 | OUTPATIENT
Start: 2018-02-20 | End: 2018-03-21

## 2018-11-09 ENCOUNTER — INPATIENT (INPATIENT)
Facility: HOSPITAL | Age: 58
LOS: 0 days | Discharge: LEFT BEFORE DISCHARGE | End: 2018-11-10
Attending: INTERNAL MEDICINE | Admitting: INTERNAL MEDICINE
Payer: COMMERCIAL

## 2018-11-09 VITALS
HEART RATE: 95 BPM | TEMPERATURE: 98 F | DIASTOLIC BLOOD PRESSURE: 101 MMHG | RESPIRATION RATE: 18 BRPM | SYSTOLIC BLOOD PRESSURE: 199 MMHG | OXYGEN SATURATION: 99 %

## 2018-11-09 DIAGNOSIS — I10 ESSENTIAL (PRIMARY) HYPERTENSION: ICD-10-CM

## 2018-11-09 DIAGNOSIS — E16.2 HYPOGLYCEMIA, UNSPECIFIED: ICD-10-CM

## 2018-11-09 DIAGNOSIS — N18.3 CHRONIC KIDNEY DISEASE, STAGE 3 (MODERATE): ICD-10-CM

## 2018-11-09 DIAGNOSIS — R41.82 ALTERED MENTAL STATUS, UNSPECIFIED: ICD-10-CM

## 2018-11-09 LAB
ALBUMIN SERPL ELPH-MCNC: 4 G/DL — SIGNIFICANT CHANGE UP (ref 3.3–5)
ALP SERPL-CCNC: 72 U/L — SIGNIFICANT CHANGE UP (ref 40–120)
ALT FLD-CCNC: 14 U/L — SIGNIFICANT CHANGE UP (ref 4–41)
APPEARANCE UR: CLEAR — SIGNIFICANT CHANGE UP
AST SERPL-CCNC: 20 U/L — SIGNIFICANT CHANGE UP (ref 4–40)
BACTERIA # UR AUTO: NEGATIVE — SIGNIFICANT CHANGE UP
BASOPHILS # BLD AUTO: 0.02 K/UL — SIGNIFICANT CHANGE UP (ref 0–0.2)
BASOPHILS NFR BLD AUTO: 0.3 % — SIGNIFICANT CHANGE UP (ref 0–2)
BILIRUB SERPL-MCNC: < 0.2 MG/DL — LOW (ref 0.2–1.2)
BILIRUB UR-MCNC: NEGATIVE — SIGNIFICANT CHANGE UP
BLOOD UR QL VISUAL: NEGATIVE — SIGNIFICANT CHANGE UP
BUN SERPL-MCNC: 18 MG/DL — SIGNIFICANT CHANGE UP (ref 7–23)
CALCIUM SERPL-MCNC: 9.1 MG/DL — SIGNIFICANT CHANGE UP (ref 8.4–10.5)
CHLORIDE SERPL-SCNC: 99 MMOL/L — SIGNIFICANT CHANGE UP (ref 98–107)
CO2 SERPL-SCNC: 24 MMOL/L — SIGNIFICANT CHANGE UP (ref 22–31)
COLOR SPEC: COLORLESS — SIGNIFICANT CHANGE UP
CREAT SERPL-MCNC: 1.56 MG/DL — HIGH (ref 0.5–1.3)
EOSINOPHIL # BLD AUTO: 0.05 K/UL — SIGNIFICANT CHANGE UP (ref 0–0.5)
EOSINOPHIL NFR BLD AUTO: 0.8 % — SIGNIFICANT CHANGE UP (ref 0–6)
GLUCOSE SERPL-MCNC: 116 MG/DL — HIGH (ref 70–99)
GLUCOSE UR-MCNC: NEGATIVE — SIGNIFICANT CHANGE UP
HCT VFR BLD CALC: 39.8 % — SIGNIFICANT CHANGE UP (ref 39–50)
HGB BLD-MCNC: 12 G/DL — LOW (ref 13–17)
HYALINE CASTS # UR AUTO: NEGATIVE — SIGNIFICANT CHANGE UP
IMM GRANULOCYTES # BLD AUTO: 0.03 # — SIGNIFICANT CHANGE UP
IMM GRANULOCYTES NFR BLD AUTO: 0.5 % — SIGNIFICANT CHANGE UP (ref 0–1.5)
KETONES UR-MCNC: NEGATIVE — SIGNIFICANT CHANGE UP
LEUKOCYTE ESTERASE UR-ACNC: NEGATIVE — SIGNIFICANT CHANGE UP
LYMPHOCYTES # BLD AUTO: 0.81 K/UL — LOW (ref 1–3.3)
LYMPHOCYTES # BLD AUTO: 13.3 % — SIGNIFICANT CHANGE UP (ref 13–44)
MCHC RBC-ENTMCNC: 22.7 PG — LOW (ref 27–34)
MCHC RBC-ENTMCNC: 30.2 % — LOW (ref 32–36)
MCV RBC AUTO: 75.2 FL — LOW (ref 80–100)
MONOCYTES # BLD AUTO: 0.58 K/UL — SIGNIFICANT CHANGE UP (ref 0–0.9)
MONOCYTES NFR BLD AUTO: 9.6 % — SIGNIFICANT CHANGE UP (ref 2–14)
NEUTROPHILS # BLD AUTO: 4.58 K/UL — SIGNIFICANT CHANGE UP (ref 1.8–7.4)
NEUTROPHILS NFR BLD AUTO: 75.5 % — SIGNIFICANT CHANGE UP (ref 43–77)
NITRITE UR-MCNC: NEGATIVE — SIGNIFICANT CHANGE UP
NRBC # FLD: 0 — SIGNIFICANT CHANGE UP
PH UR: 6.5 — SIGNIFICANT CHANGE UP (ref 5–8)
PLATELET # BLD AUTO: 251 K/UL — SIGNIFICANT CHANGE UP (ref 150–400)
PMV BLD: 10.9 FL — SIGNIFICANT CHANGE UP (ref 7–13)
POTASSIUM SERPL-MCNC: 4.2 MMOL/L — SIGNIFICANT CHANGE UP (ref 3.5–5.3)
POTASSIUM SERPL-SCNC: 4.2 MMOL/L — SIGNIFICANT CHANGE UP (ref 3.5–5.3)
PROT SERPL-MCNC: 7.2 G/DL — SIGNIFICANT CHANGE UP (ref 6–8.3)
PROT UR-MCNC: 50 — SIGNIFICANT CHANGE UP
RBC # BLD: 5.29 M/UL — SIGNIFICANT CHANGE UP (ref 4.2–5.8)
RBC # FLD: 17 % — HIGH (ref 10.3–14.5)
RBC CASTS # UR COMP ASSIST: SIGNIFICANT CHANGE UP (ref 0–?)
SODIUM SERPL-SCNC: 137 MMOL/L — SIGNIFICANT CHANGE UP (ref 135–145)
SP GR SPEC: 1.01 — SIGNIFICANT CHANGE UP (ref 1–1.04)
SQUAMOUS # UR AUTO: SIGNIFICANT CHANGE UP
TROPONIN T, HIGH SENSITIVITY: 34 NG/L — SIGNIFICANT CHANGE UP (ref ?–14)
TROPONIN T, HIGH SENSITIVITY: 35 NG/L — SIGNIFICANT CHANGE UP (ref ?–14)
UROBILINOGEN FLD QL: NORMAL — SIGNIFICANT CHANGE UP
WBC # BLD: 6.07 K/UL — SIGNIFICANT CHANGE UP (ref 3.8–10.5)
WBC # FLD AUTO: 6.07 K/UL — SIGNIFICANT CHANGE UP (ref 3.8–10.5)
WBC UR QL: SIGNIFICANT CHANGE UP (ref 0–?)

## 2018-11-09 PROCEDURE — 70450 CT HEAD/BRAIN W/O DYE: CPT | Mod: 26

## 2018-11-09 PROCEDURE — 71046 X-RAY EXAM CHEST 2 VIEWS: CPT | Mod: 26

## 2018-11-09 NOTE — ED PROVIDER NOTE - PMH
CAD (coronary artery disease)    HLD (hyperlipidemia)    Hypoglycemia    Type II or unspecified type diabetes mellitus without mention of complication, not stated as uncontr    Unspecified essential hypertension

## 2018-11-09 NOTE — CONSULT NOTE ADULT - ASSESSMENT
57m with a history of insulin dependent DMII, HTN, Dyslipidemia, CKD 3, CAD with stents 8 years ago, experiencing confusion sec hypoglycemia.

## 2018-11-09 NOTE — CONSULT NOTE ADULT - PROBLEM SELECTOR RECOMMENDATION 9
baseline ~ 1.5-1.6  ckd sec to DM/HTN  renal function stable at baseline  monitor bmp  check UA  avoid nephrotoxic agents

## 2018-11-09 NOTE — ED PROVIDER NOTE - ATTENDING CONTRIBUTION TO CARE
Attending Statement: I have reviewed and agree with all pertinent clinical information, including history and physical exam and agree with treatment plan of the PA, except as noted.  59yo M hx of DM, HTN, CAD, HLD, BIBEMS form home co AMS at home. PT recalls being home watching TV felt "dizzy" numbness to face/mouth, "not feeling ok" then being in ED. Told that FS was Low. Pt states that he has not checked his FS in "over two weeks" but has not been feeling well over the last two weeks and assumed that it was "my low FS" He self dc the lantus. Taking "only one metformin and sometimes a little insulin" last time he took lantus was "couple of days ago, he "5-10 units of lantus" at night after "eating a lot"   Now pt denies any complaints. no focal weakness/numbness. no chest pain or sob. no palpitations. no abdominal pain.   no family w pt. calling wife. pending call back.  Vital signs noted. EOMI. no facial symmetry. no drooling. supple neck. normal S1-S2 No resp distress. able to speak in full and clear sentences. no wheeze, rales or stridor. AOx3, no focal deficits. CN 2-12 grossly intact. nl gait. no meningeal signs. no sign of facial trauma.   plan ekg, labs, cxr, ct head, tele monitoring, admission. Attending Statement: I have reviewed and agree with all pertinent clinical information, including history and physical exam and agree with treatment plan of the PA, except as noted.  57yo M hx of DM, HTN, CAD, HLD, BIBEMS form home co AMS at home. PT recalls being home watching TV felt "dizzy" numbness to face/mouth, "not feeling ok" then being in ED. Told that FS was Low. Pt states that he has not checked his FS in "over two weeks" but has not been feeling well over the last two weeks and assumed that it was "my low FS" He self dc the lantus. Taking "only one metformin and sometimes a little insulin" last time he took lantus was "couple of days ago, he "5-10 units of lantus" at night after "eating a lot"   Now pt denies any complaints. no focal weakness/numbness. no chest pain or sob. no palpitations. no abdominal pain. no leg swelling no calf pain. no trauma. no travel. non smoker. no ETOH use   no family w pt. calling wife. pending call back.  Vital signs noted. EOMI. no facial symmetry. no drooling. supple neck. normal S1-S2 No resp distress. able to speak in full and clear sentences. no wheeze, rales or stridor. AOx3, no focal deficits. CN 2-12 grossly intact. nl gait. no meningeal signs. no sign of facial trauma.   plan ekg, labs, cxr, ct head, tele monitoring, admission.

## 2018-11-09 NOTE — ED ADULT NURSE NOTE - OBJECTIVE STATEMENT
Patient presented to ED A&O x4, with c/o dizziness, blurred vision, and states his wife caled EMS after he became unresponsive. Patient states he believes his glucose was low when he felt dizzy, so he ate a lollipop, sugar and 2 beef patties.  Patient does not recall events described by wife of unresponsiveness. EKG done and cardiac monitor in place.  Blood work drawn and sent to lab. ER physician evaluated patient.  Will continue to monitor patient closely. Belkis MORENO

## 2018-11-09 NOTE — ED PROVIDER NOTE - OBJECTIVE STATEMENT
57 yo M, with PMH of IDDM, HTN, HLD, CAD, hypoglycemia, BIBEMS to ER c/o altered metal status today. Pt states he was sitting on the bed watching TV, had blurry vision, dizziness, mouth numbness prior to passing out. Report he's sugar has been low for the past week, 40-60's, only taking Insulin 1-2 times this week, admits taking Metformin once daily instead of twice daily. Admits he woke up 3am and got ready to work, came back around 8AM for break, had breakfast, beef faina x2, coffee with sugar, lollipop. Admits having similar episode in the past w/ low sugar. Denies any headache, dizziness, visual disturbances, chest pain, sob, back pain, abdominal pain, hematuria, leg swelling, hx of PE/DVT, recent travel, or any other complaints.

## 2018-11-09 NOTE — ED PROVIDER NOTE - MEDICAL DECISION MAKING DETAILS
59 yo M, with PMH of IDDM, HTN, HLD, CAD, hypoglycemia, BIBEMS to ER c/o altered metal status today. Well appearing male, back to baseline, AAOx3, p/w episode of confusion and syncope today, low sugar x1 week, intermittently compliant with medications. No focal neuro deficits. No head trauma. Blood sugar 73. Pt had blurry vision/mouth numbness/dizziness prior to syncope, but wife states patient was confused, didn't passed out. Pt s/p cath 2/2018 with mild non-obstructive disease. R/o ACS, possible hypoglycemia. Plan: CT head, labs, Ua, ekg, trop, CXR and reassess.

## 2018-11-09 NOTE — ED ADULT NURSE NOTE - NSIMPLEMENTINTERV_GEN_ALL_ED
Implemented All Fall with Harm Risk Interventions:  [place] to call system. Call bell, personal items and telephone within reach. Instruct patient to call for assistance. Room bathroom lighting operational. Non-slip footwear when patient is off stretcher. Physically safe environment: no spills, clutter or unnecessary equipment. Stretcher in lowest position, wheels locked, appropriate side rails in place. Provide visual cue, wrist band, yellow gown, etc. Monitor gait and stability. Monitor for mental status changes and reorient to person, place, and time. Review medications for side effects contributing to fall risk. Reinforce activity limits and safety measures with patient and family. Provide visual clues: red socks.

## 2018-11-09 NOTE — ED ADULT TRIAGE NOTE - CHIEF COMPLAINT QUOTE
as per EMS pts wife woke up around noon and found pt to be confused, as per EMS pt went to work this AM and was well. pt was found to have a BGL in the 80s and was given glucose by EMS, pt alert at this time however BGL is dropping now 73. [place] negative.

## 2018-11-09 NOTE — ED PROVIDER NOTE - PROGRESS NOTE DETAILS
PA JESSICA: Patient reassessed, sitting comfortably in chair in NAD, denies any complaints. States feeling better, symptoms improved. Spoke with wife on phone, states that patient was sitting in bed, moving around, like going "crazy", not making any senses, then called 911, admits having similar episodes in the past. Pending CT head. Accepted for admission to Dr. Oneil on tele given ?syncope. MAR textpage sent.

## 2018-11-09 NOTE — ED ADULT NURSE NOTE - CHIEF COMPLAINT QUOTE
as per EMS pts wife woke up around noon and found pt to be confused, as per EMS pt went to work this AM and was well. pt was found to have a BGL in the 80s and was given glucose by EMS, pt alert at this time however BGL is dropping now 73. [place] negative.

## 2018-11-09 NOTE — ED ADULT NURSE NOTE - PMH
Type II or unspecified type diabetes mellitus without mention of complication, not stated as uncontr    Unspecified essential hypertension

## 2018-11-10 VITALS
SYSTOLIC BLOOD PRESSURE: 135 MMHG | TEMPERATURE: 98 F | OXYGEN SATURATION: 98 % | HEART RATE: 89 BPM | RESPIRATION RATE: 16 BRPM | DIASTOLIC BLOOD PRESSURE: 70 MMHG

## 2018-11-10 DIAGNOSIS — E78.5 HYPERLIPIDEMIA, UNSPECIFIED: ICD-10-CM

## 2018-11-10 DIAGNOSIS — Z29.9 ENCOUNTER FOR PROPHYLACTIC MEASURES, UNSPECIFIED: ICD-10-CM

## 2018-11-10 DIAGNOSIS — I10 ESSENTIAL (PRIMARY) HYPERTENSION: ICD-10-CM

## 2018-11-10 DIAGNOSIS — R41.82 ALTERED MENTAL STATUS, UNSPECIFIED: ICD-10-CM

## 2018-11-10 LAB
BUN SERPL-MCNC: 22 MG/DL — SIGNIFICANT CHANGE UP (ref 7–23)
CALCIUM SERPL-MCNC: 8.8 MG/DL — SIGNIFICANT CHANGE UP (ref 8.4–10.5)
CHLORIDE SERPL-SCNC: 104 MMOL/L — SIGNIFICANT CHANGE UP (ref 98–107)
CHOLEST SERPL-MCNC: 200 MG/DL — HIGH (ref 120–199)
CO2 SERPL-SCNC: 22 MMOL/L — SIGNIFICANT CHANGE UP (ref 22–31)
CREAT SERPL-MCNC: 1.79 MG/DL — HIGH (ref 0.5–1.3)
GLUCOSE BLDC GLUCOMTR-MCNC: 131 MG/DL — HIGH (ref 70–99)
GLUCOSE SERPL-MCNC: 142 MG/DL — HIGH (ref 70–99)
HBA1C BLD-MCNC: 6.2 % — HIGH (ref 4–5.6)
HCT VFR BLD CALC: 36.9 % — LOW (ref 39–50)
HDLC SERPL-MCNC: 39 MG/DL — SIGNIFICANT CHANGE UP (ref 35–55)
HGB BLD-MCNC: 11.1 G/DL — LOW (ref 13–17)
LIPID PNL WITH DIRECT LDL SERPL: 114 MG/DL — SIGNIFICANT CHANGE UP
MAGNESIUM SERPL-MCNC: 1.9 MG/DL — SIGNIFICANT CHANGE UP (ref 1.6–2.6)
MCHC RBC-ENTMCNC: 22.5 PG — LOW (ref 27–34)
MCHC RBC-ENTMCNC: 30.1 % — LOW (ref 32–36)
MCV RBC AUTO: 74.8 FL — LOW (ref 80–100)
NRBC # FLD: 0 — SIGNIFICANT CHANGE UP
PHOSPHATE SERPL-MCNC: 2.8 MG/DL — SIGNIFICANT CHANGE UP (ref 2.5–4.5)
PLATELET # BLD AUTO: 241 K/UL — SIGNIFICANT CHANGE UP (ref 150–400)
PMV BLD: 10.5 FL — SIGNIFICANT CHANGE UP (ref 7–13)
POTASSIUM SERPL-MCNC: 4.5 MMOL/L — SIGNIFICANT CHANGE UP (ref 3.5–5.3)
POTASSIUM SERPL-SCNC: 4.5 MMOL/L — SIGNIFICANT CHANGE UP (ref 3.5–5.3)
RBC # BLD: 4.93 M/UL — SIGNIFICANT CHANGE UP (ref 4.2–5.8)
RBC # FLD: 17 % — HIGH (ref 10.3–14.5)
SODIUM SERPL-SCNC: 139 MMOL/L — SIGNIFICANT CHANGE UP (ref 135–145)
TRIGL SERPL-MCNC: 306 MG/DL — HIGH (ref 10–149)
TSH SERPL-MCNC: 1.05 UIU/ML — SIGNIFICANT CHANGE UP (ref 0.27–4.2)
WBC # BLD: 4.79 K/UL — SIGNIFICANT CHANGE UP (ref 3.8–10.5)
WBC # FLD AUTO: 4.79 K/UL — SIGNIFICANT CHANGE UP (ref 3.8–10.5)

## 2018-11-10 RX ORDER — DEXTROSE 50 % IN WATER 50 %
15 SYRINGE (ML) INTRAVENOUS ONCE
Qty: 0 | Refills: 0 | Status: DISCONTINUED | OUTPATIENT
Start: 2018-11-10 | End: 2018-11-10

## 2018-11-10 RX ORDER — ENOXAPARIN SODIUM 100 MG/ML
0 INJECTION SUBCUTANEOUS
Qty: 0 | Refills: 0 | COMMUNITY

## 2018-11-10 RX ORDER — INSULIN LISPRO 100/ML
VIAL (ML) SUBCUTANEOUS AT BEDTIME
Qty: 0 | Refills: 0 | Status: DISCONTINUED | OUTPATIENT
Start: 2018-11-10 | End: 2018-11-10

## 2018-11-10 RX ORDER — SODIUM CHLORIDE 9 MG/ML
3 INJECTION INTRAMUSCULAR; INTRAVENOUS; SUBCUTANEOUS EVERY 8 HOURS
Qty: 0 | Refills: 0 | Status: DISCONTINUED | OUTPATIENT
Start: 2018-11-10 | End: 2018-11-10

## 2018-11-10 RX ORDER — DEXTROSE 50 % IN WATER 50 %
12.5 SYRINGE (ML) INTRAVENOUS ONCE
Qty: 0 | Refills: 0 | Status: DISCONTINUED | OUTPATIENT
Start: 2018-11-10 | End: 2018-11-10

## 2018-11-10 RX ORDER — DEXTROSE 50 % IN WATER 50 %
25 SYRINGE (ML) INTRAVENOUS ONCE
Qty: 0 | Refills: 0 | Status: DISCONTINUED | OUTPATIENT
Start: 2018-11-10 | End: 2018-11-10

## 2018-11-10 RX ORDER — SODIUM CHLORIDE 9 MG/ML
1000 INJECTION, SOLUTION INTRAVENOUS
Qty: 0 | Refills: 0 | Status: DISCONTINUED | OUTPATIENT
Start: 2018-11-10 | End: 2018-11-10

## 2018-11-10 RX ORDER — INSULIN GLARGINE 100 [IU]/ML
20 INJECTION, SOLUTION SUBCUTANEOUS
Qty: 0 | Refills: 0 | COMMUNITY

## 2018-11-10 RX ORDER — ASPIRIN/CALCIUM CARB/MAGNESIUM 324 MG
81 TABLET ORAL DAILY
Qty: 0 | Refills: 0 | Status: DISCONTINUED | OUTPATIENT
Start: 2018-11-10 | End: 2018-11-10

## 2018-11-10 RX ORDER — HEPARIN SODIUM 5000 [USP'U]/ML
5000 INJECTION INTRAVENOUS; SUBCUTANEOUS EVERY 12 HOURS
Qty: 0 | Refills: 0 | Status: DISCONTINUED | OUTPATIENT
Start: 2018-11-10 | End: 2018-11-10

## 2018-11-10 RX ORDER — INSULIN LISPRO 100/ML
VIAL (ML) SUBCUTANEOUS
Qty: 0 | Refills: 0 | Status: DISCONTINUED | OUTPATIENT
Start: 2018-11-10 | End: 2018-11-10

## 2018-11-10 RX ORDER — HYDROCHLOROTHIAZIDE 25 MG
12.5 TABLET ORAL DAILY
Qty: 0 | Refills: 0 | Status: DISCONTINUED | OUTPATIENT
Start: 2018-11-10 | End: 2018-11-10

## 2018-11-10 RX ORDER — AMLODIPINE BESYLATE 2.5 MG/1
1 TABLET ORAL
Qty: 0 | Refills: 0 | COMMUNITY

## 2018-11-10 RX ORDER — GLUCAGON INJECTION, SOLUTION 0.5 MG/.1ML
1 INJECTION, SOLUTION SUBCUTANEOUS ONCE
Qty: 0 | Refills: 0 | Status: DISCONTINUED | OUTPATIENT
Start: 2018-11-10 | End: 2018-11-10

## 2018-11-10 RX ORDER — GLYBURIDE-METFORMIN HYDROCHLORIDE 1.25; 25 MG/1; MG/1
1 TABLET ORAL
Qty: 0 | Refills: 0 | COMMUNITY

## 2018-11-10 RX ORDER — LOSARTAN/HYDROCHLOROTHIAZIDE 100MG-25MG
1 TABLET ORAL
Qty: 0 | Refills: 0 | COMMUNITY

## 2018-11-10 RX ORDER — INSULIN GLARGINE 100 [IU]/ML
20 INJECTION, SOLUTION SUBCUTANEOUS AT BEDTIME
Qty: 0 | Refills: 0 | Status: DISCONTINUED | OUTPATIENT
Start: 2018-11-10 | End: 2018-11-10

## 2018-11-10 RX ORDER — NICOTINE POLACRILEX 2 MG
2 GUM BUCCAL EVERY 4 HOURS
Qty: 0 | Refills: 0 | Status: DISCONTINUED | OUTPATIENT
Start: 2018-11-10 | End: 2018-11-10

## 2018-11-10 RX ORDER — AMLODIPINE BESYLATE 2.5 MG/1
10 TABLET ORAL DAILY
Qty: 0 | Refills: 0 | Status: DISCONTINUED | OUTPATIENT
Start: 2018-11-10 | End: 2018-11-10

## 2018-11-10 RX ORDER — LOSARTAN POTASSIUM 100 MG/1
100 TABLET, FILM COATED ORAL DAILY
Qty: 0 | Refills: 0 | Status: DISCONTINUED | OUTPATIENT
Start: 2018-11-10 | End: 2018-11-10

## 2018-11-10 RX ADMIN — Medication 12.5 MILLIGRAM(S): at 05:56

## 2018-11-10 RX ADMIN — LOSARTAN POTASSIUM 100 MILLIGRAM(S): 100 TABLET, FILM COATED ORAL at 05:56

## 2018-11-10 RX ADMIN — HEPARIN SODIUM 5000 UNIT(S): 5000 INJECTION INTRAVENOUS; SUBCUTANEOUS at 05:56

## 2018-11-10 RX ADMIN — AMLODIPINE BESYLATE 10 MILLIGRAM(S): 2.5 TABLET ORAL at 05:55

## 2018-11-10 RX ADMIN — SODIUM CHLORIDE 3 MILLILITER(S): 9 INJECTION INTRAMUSCULAR; INTRAVENOUS; SUBCUTANEOUS at 05:56

## 2018-11-10 NOTE — DISCHARGE NOTE ADULT - SECONDARY DIAGNOSIS.
HTN (hypertension), benign HLD (hyperlipidemia) Diabetes CKD (chronic kidney disease) stage 3, GFR 30-59 ml/min

## 2018-11-10 NOTE — H&P ADULT - NEUROLOGICAL DETAILS
responds to verbal commands/sensation intact/normal strength/alert and oriented x 3/no spontaneous movement

## 2018-11-10 NOTE — H&P ADULT - NEGATIVE ENMT SYMPTOMS
no nasal congestion/no vertigo/no sinus symptoms/no nasal discharge/no ear pain/no tinnitus/no nasal obstruction

## 2018-11-10 NOTE — PROGRESS NOTE ADULT - SUBJECTIVE AND OBJECTIVE BOX
[facility]   MD MELANIE VÁSQUEZ MD ANGELA WONG, PA    TEL:  OFFICE: [phone]  DR DIAZ CELL: [phone]  DR. NORTON CELL: [phone]  VIRGINIA COBB CELL: [phone]        Patient is a 58y old  Male who presents with a chief complaint of Hypoglycemia (10 Nov 2018 12:54)      Patient seen and examined at bedside. No chest pain/sob    VITALS:  T(F): 98 (11-10-18 @ 09:04), Max: 98 (11-10-18 @ 09:04)  HR: 89 (11-10-18 @ 09:04)  BP: 135/70 (11-10-18 @ 09:04)  RR: 16 (11-10-18 @ 09:04)  SpO2: 98% (11-10-18 @ 09:04)  Wt(kg): --    Height (cm): 180.34 (11-10 @ 00:42)  Weight (kg): 81.6 (11-10 @ 00:42)  BMI (kg/m2): 25.1 (11-10 @ 00:42)  BSA (m2): 2.02 (11-10 @ 00:42)    PHYSICAL EXAM:  Constitutional: NAD  Neck: No JVD  Respiratory: CTAB, no wheezes, rales or rhonchi  Cardiovascular: S1, S2, RRR  Gastrointestinal: BS+, soft, NT/ND  Extremities: No peripheral edema    Hospital Medications:   MEDICATIONS  (STANDING):  amLODIPine   Tablet 10 milliGRAM(s) Oral daily  aspirin enteric coated 81 milliGRAM(s) Oral daily  dextrose 5%. 1000 milliLiter(s) (50 mL/Hr) IV Continuous <Continuous>  dextrose 50% Injectable 12.5 Gram(s) IV Push once  dextrose 50% Injectable 25 Gram(s) IV Push once  dextrose 50% Injectable 25 Gram(s) IV Push once  heparin  Injectable 5000 Unit(s) SubCutaneous every 12 hours  hydrochlorothiazide 12.5 milliGRAM(s) Oral daily  insulin lispro (HumaLOG) corrective regimen sliding scale   SubCutaneous three times a day before meals  insulin lispro (HumaLOG) corrective regimen sliding scale   SubCutaneous at bedtime  losartan 100 milliGRAM(s) Oral daily  sodium chloride 0.9% lock flush 3 milliLiter(s) IV Push every 8 hours      LABS:  11-10    139  |  104  |  22  ----------------------------<  142<H>  4.5   |  22  |  1.79<H>    Ca    8.8      10 Nov 2018 05:40  Phos  2.8     11-10  Mg     1.9     11-10    TPro  7.2  /  Alb  4.0  /  TBili  < 0.2<L>  /  DBili      /  AST  20  /  ALT  14  /  AlkPhos  72  11-09    Creatinine Trend: 1.79 <--, 1.56 <--    Phosphorus Level, Serum: 2.8 mg/dL (11-10 @ 05:40)                              11.1   4.79  )-----------( 241      ( 10 Nov 2018 05:40 )             36.9     Urine Studies:  Urinalysis - [11-09-18 @ 16:22]      Color COLORLESS / Appearance CLEAR / SG 1.006 / pH 6.5      Gluc NEGATIVE / Ketone NEGATIVE  / Bili NEGATIVE / Urobili NORMAL       Blood NEGATIVE / Protein 50 / Leuk Est NEGATIVE / Nitrite NEGATIVE      RBC 0-2 / WBC 0-2 / Hyaline NEGATIVE / Gran  / Sq Epi OCC / Non Sq Epi  / Bacteria NEGATIVE      HbA1c 6.2      [11-10-18 @ 05:40]  TSH 1.05      [11-10-18 @ 05:40]  Lipid: chol 200, , HDL 39,       [11-10-18 @ 05:40]        RADIOLOGY & ADDITIONAL STUDIES:

## 2018-11-10 NOTE — H&P ADULT - NSHPLABSRESULTS_GEN_ALL_CORE
CT HEAD =Volume loss and microvascular disease, no  hemorrhage or midline shift, if symptoms persist consider follow-up CT or MRI if no contraindications  CXR =The lungs are clear.  EKG NSR @ 85b/ min ,LAE, QW V1 V2, TWI 1, AVL, V5 V6   H &H = 12/ 40  HsT= 34--> 35  BUN/ creatinine= 18/ 1.56  Glucose= 116

## 2018-11-10 NOTE — DISCHARGE NOTE ADULT - MEDICATION SUMMARY - MEDICATIONS TO TAKE
I will START or STAY ON the medications listed below when I get home from the hospital:    aspirin 81 mg oral delayed release tablet  -- 1 tab(s) by mouth once a day  -- Indication: For Need for prophylactic measure    glyburide-metformin 5 mg-500 mg oral tablet  -- 1 tab(s) by mouth 2 times a day  -- Indication: For Diabetes    Lantus 100 units/mL subcutaneous solution  -- 20 unit(s) subcutaneous once a day (at bedtime)  -- Indication: For Diabetes    losartan-hydrochlorothiazide 100mg-12.5mg oral tablet  -- 1 tab(s) by mouth once a day  -- Indication: For Hypertension    amLODIPine 10 mg oral tablet  -- 1 tab(s) by mouth once a day  -- Indication: For Hypertension

## 2018-11-10 NOTE — DISCHARGE NOTE ADULT - PLAN OF CARE
Back to baseline. Likely from an episode of hypoglycemia. Follow up with your primary care doctor. To maintain a normal blood pressure to prevent heart attack, stroke and renal failure. Low sodium and fat diet, continue anti-hypertensive medications, and follow up with primary care physician. To maintain normal cholesterol levels to prevent stroke, coronary artery disease, peripheral vascular disease and heart attacks. Low fat diet, exercise daily and continue current medications. Follow up with primary care physician and cardiologist for management. To maintain a normal blood glucose level and HgA1C level < 5.7 and to prevent diabetic complications such as uncontrolled diabetes, diabetic coma, blindness, renal failure, and amputations. Monitor finger sticks pre-meal and bedtime, low salt, fat and carbohydrate diet, minimize glucose intake.  Exercise daily for at least 30 minutes and weight loss.  Follow up with primary care physician and endocrinologist for routine Hemoglobin A1C checks and management.  Follow up with your ophthalmologist for routine yearly vision exams. To prevent shortness of breath, fluid overload, and electrolytes imbalance, and to slow down worsening kidney disease. Continue blood pressure, cholesterol and diabetic medications. Goal of hemoglobin A1C (HgbA1C) < 7%.  Avoid nephrotoxic drugs such as nonsteroidal anti-inflammatory agents (NSAIDs).   Please follow up with your nephrologist to monitor your kidney function, continue with low protein and potassium diet.

## 2018-11-10 NOTE — DISCHARGE NOTE ADULT - CARE PROVIDER_API CALL
Enrico Cuevas), Internal Medicine; Nephrology  [address]  [address]nd [place] [address]  Phone: [phone]  Fax: [phone]    Margarito Lezama), Internal Medicine  [address] [place] [address]  Phone: [phone]  Fax: [phone]    Waqar Lemus), Internal Medicine  [address] [place] [address]  Phone: [phone]  Fax: [phone]

## 2018-11-10 NOTE — H&P ADULT - ATTENDING COMMENTS
pt seen and exMINED  want to leave [facility]a , says he feels better doesn't want to stay in hospital , refused endo consult eval  pt has capacity to leave ama  risk of signing out ama explained, pt understood

## 2018-11-10 NOTE — DISCHARGE NOTE ADULT - HOSPITAL COURSE
59 y/o Male, with a PmHx of DM2, HTN, Dyslipidemia, CAD, presented to the [facility] ED c/o near syncope. H states he was sitting on the bed watching TV, had blurry vision, dizziness, mouth numbness prior to passing out. Report he's sugar has been low for the past week, 40-60's, only taking Insulin 1-2 times this week, admits taking Metformin once daily instead of twice daily. Admits he woke up 3am and got ready to work, came back around 8AM for break, had breakfast, beef faina x 2, coffee with sugar, lollipop. Admits having similar episode in the past w/ low sugar. Denies any headache, dizziness, visual disturbances, chest pain, sob, back pain, abdominal pain, hematuria, leg swelling, chills, diaphoresis. Was admitted to telemetry for near syncope and hypoglycemia.    On admission, EKG done showed NSR @ 85b/ min, LAE, QW V1 V2, TWI 1, AVL, V5 V6. hsTrop 34--->35. HgbA1-c: 6.2. UA neg. CT Head done showed a volume loss and microvascular disease, no  hemorrhage or midline shift, if symptoms persist consider follow-up CT or MRI if no contraindications. CXR done showed clear lungs. While in the ED, pt was also found to have margarita with a Bun/Cr on 18/1.56 that increased to 22/1.79. He was seen and assessed by Nephrologist Dr. Cuevas. Cardio c/s was done by Dr. Goodwin.   Further testing and assessment was going to continue but patient is refusing to stay in the hospital and wanted to leave against medical advice. Patient is A&O x3 and has full capacity to make his or her own medical decisions. Pt was informed of their medical conditions, benefits, and alternatives to treatment as well as the risks of refusing treatment and the seriousness of leaving against medical advice such as the risks of heart attack, stroke, seizure, and even death were fully explained to the patient.  After expressing full understanding, patient then signs out against medical advice witnessed by the nurse.  Attending Dr. Oneil was made aware.   He was advised to follow up with Dr. Cuevas and his primary care doctor.

## 2018-11-10 NOTE — DISCHARGE NOTE ADULT - CARE PLAN
Principal Discharge DX:	Altered mental status  Goal:	Back to baseline. Likely from an episode of hypoglycemia.  Assessment and plan of treatment:	Follow up with your primary care doctor.  Secondary Diagnosis:	HTN (hypertension), benign  Goal:	To maintain a normal blood pressure to prevent heart attack, stroke and renal failure.  Assessment and plan of treatment:	Low sodium and fat diet, continue anti-hypertensive medications, and follow up with primary care physician.  Secondary Diagnosis:	HLD (hyperlipidemia)  Goal:	To maintain normal cholesterol levels to prevent stroke, coronary artery disease, peripheral vascular disease and heart attacks.  Assessment and plan of treatment:	Low fat diet, exercise daily and continue current medications. Follow up with primary care physician and cardiologist for management.  Secondary Diagnosis:	Diabetes  Goal:	To maintain a normal blood glucose level and HgA1C level < 5.7 and to prevent diabetic complications such as uncontrolled diabetes, diabetic coma, blindness, renal failure, and amputations.  Assessment and plan of treatment:	Monitor finger sticks pre-meal and bedtime, low salt, fat and carbohydrate diet, minimize glucose intake.  Exercise daily for at least 30 minutes and weight loss.  Follow up with primary care physician and endocrinologist for routine Hemoglobin A1C checks and management.  Follow up with your ophthalmologist for routine yearly vision exams.  Secondary Diagnosis:	CKD (chronic kidney disease) stage 3, GFR 30-59 ml/min  Goal:	To prevent shortness of breath, fluid overload, and electrolytes imbalance, and to slow down worsening kidney disease.  Assessment and plan of treatment:	Continue blood pressure, cholesterol and diabetic medications. Goal of hemoglobin A1C (HgbA1C) < 7%.  Avoid nephrotoxic drugs such as nonsteroidal anti-inflammatory agents (NSAIDs).   Please follow up with your nephrologist to monitor your kidney function, continue with low protein and potassium diet. Yes

## 2018-11-10 NOTE — DISCHARGE NOTE ADULT - PATIENT PORTAL LINK FT
You can access the [facility] Patient Portal, offered by [facility], by registering with the following website: [email or web address]

## 2018-11-10 NOTE — CONSULT NOTE ADULT - SUBJECTIVE AND OBJECTIVE BOX
Cardiovascular Disease Initial Evaluation    CHIEF COMPLAINT: Weakness and AMS    HISTORY OF PRESENT ILLNESS:  This is a 58 year old man with systolic CHF due to NICM, CAD, HTN, Dyslipidemia, and IDDM who presented to [facility] ED on 11/9/2018 with AMS. Mr. Head states he was sitting on the bed watching TV, had blurry vision, dizziness, mouth numbness prior to passing out. Report he's sugar has been low for the past week, 40-60's, only taking Insulin 1-2 times this week, admits taking Metformin once daily instead of twice daily. He denies any headache, dizziness, visual disturbances, chest pain, sob, back pain, abdominal pain, hematuria, leg swelling, chills, diaphoresis     Allergies  ampicillin (Swelling)  penicillins (Swelling)  	    MEDICATIONS:  amLODIPine   Tablet 10 milliGRAM(s) Oral daily  aspirin enteric coated 81 milliGRAM(s) Oral daily  heparin  Injectable 5000 Unit(s) SubCutaneous every 12 hours  hydrochlorothiazide 12.5 milliGRAM(s) Oral daily  losartan 100 milliGRAM(s) Oral daily    dextrose 40% Gel 15 Gram(s) Oral once PRN  dextrose 50% Injectable 12.5 Gram(s) IV Push once  dextrose 50% Injectable 25 Gram(s) IV Push once  dextrose 50% Injectable 25 Gram(s) IV Push once  glucagon  Injectable 1 milliGRAM(s) IntraMuscular once PRN  insulin lispro (HumaLOG) corrective regimen sliding scale   SubCutaneous three times a day before meals  insulin lispro (HumaLOG) corrective regimen sliding scale   SubCutaneous at bedtime    dextrose 5%. 1000 milliLiter(s) IV Continuous <Continuous>  sodium chloride 0.9% lock flush 3 milliLiter(s) IV Push every 8 hours      PAST MEDICAL & SURGICAL HISTORY:  HLD (hyperlipidemia)  Hypoglycemia  CAD (coronary artery disease)  Unspecified essential hypertension  Type II or unspecified type diabetes mellitus without mention of complication, not stated as uncontr  S/P cardiac catheterization: 2010 (stent placement?)      FAMILY HISTORY:  No pertinent family history in first degree relatives      SOCIAL HISTORY:    Non-smoker        REVIEW OF SYSTEMS:  See HPI, otherwise complete 10 point review of systems negative    PHYSICAL EXAM:  T(C): 36.7 (11-10-18 @ 09:04), Max: 36.7 (11-09-18 @ 13:19)  HR: 89 (11-10-18 @ 09:04) (68 - 95)  BP: 135/70 (11-10-18 @ 09:04) (112/85 - 199/101)  RR: 16 (11-10-18 @ 09:04) (14 - 18)  SpO2: 98% (11-10-18 @ 09:04) (97% - 100%)  Wt(kg): --  I&O's Summary      Appearance: No Acute Distress	  HEENT:  Normal oral mucosa, PERRL, EOMI	  Cardiovascular: Normal S1 S2, No JVD, No murmurs/rubs/gallops  Respiratory: Lungs clear to auscultation bilaterally  Gastrointestinal:  Soft, Non-tender, + BS	  Skin: No rashes, No ecchymoses, No cyanosis	  Neurologic: Non-focal  Extremities: No clubbing, cyanosis or edema  Vascular: Peripheral pulses palpable 2+ bilaterally  Psychiatry: A & O x 3, Mood & affect appropriate    Laboratory Data:	 	    CBC Full  -  ( 10 Nov 2018 05:40 )  WBC Count : 4.79 K/uL  Hemoglobin : 11.1 g/dL  Hematocrit : 36.9 %  Platelet Count - Automated : 241 K/uL  Mean Cell Volume : 74.8 fL  Mean Cell Hemoglobin : 22.5 pg  Mean Cell Hemoglobin Concentration : 30.1 %  Auto Neutrophil # : x  Auto Lymphocyte # : x  Auto Monocyte # : x  Auto Eosinophil # : x  Auto Basophil # : x  Auto Neutrophil % : x  Auto Lymphocyte % : x  Auto Monocyte % : x  Auto Eosinophil % : x  Auto Basophil % : x    11-10    139  |  104  |  22  ----------------------------<  142<H>  4.5   |  22  |  1.79<H>  11-09    137  |  99  |  18  ----------------------------<  116<H>  4.2   |  24  |  1.56<H>    Ca    8.8      10 Nov 2018 05:40  Ca    9.1      09 Nov 2018 15:00  Phos  2.8     11-10  Mg     1.9     11-10    TPro  7.2  /  Alb  4.0  /  TBili  < 0.2<L>  /  DBili  x   /  AST  20  /  ALT  14  /  AlkPhos  72  11-09      proBNP:   Lipid Profile:   HgA1c: Hemoglobin A1C, Whole Blood: 6.2 % (11-10 @ 05:40)    TSH: Thyroid Stimulating Hormone, Serum: 1.05 uIU/mL (11-10 @ 05:40)      Interpretation of Telemetry: Sinus	    ECG:  Sinus; LAFB    Assessment:  58 year old man with systolic CHF due to NICM, CAD, HTN, Dyslipidemia, and IDDM presents with hypoglycemia.     Plan of Care:  57 year old man with HTN and controlled IDDM presents with altered mental status and acute systolic CHF    #Compensated systolic CHF-   Due to NICM  Cardiac cath performed 2/16/2018 reveals nonobstructive CAD.  Patient euvolemic on exam.  Continue ASA and ARB.    #HTN- Controlled on current regimen.      62 minutes spent on total encounter; more than 50% of the visit was spent counseling and/or coordinating care by the attending physician.   	  Waqar Lemus MD [facility]  Cardiovascular Diseases  [phone]
[facility] [place] [facility]   MD MELANIE VÁSQUEZ MD ANGELA WONG, PA    TEL:  OFFICE: [phone]  DR DIAZ CELL: [phone]  DR. NORTON CELL: [phone]  VIR[place]IA COBB CELL: [phone]      HPI:  57m with a history of insulin dependent DMII, HTN, Dyslipidemia, CKD 3, CAD with stents 8 years ago, experiencing confusion sec hypoglycemia. Per patient FS has been running low for the past few weeks. patient stopped taking insulin only takes metformin but blood sugar is still low. Per patient he felt that his sugar was low this AM had juice but symptoms did not improve. found by wife to be confused. Currently patient is A+Ox 4 and recall everything that happened     Allergies:  ampicillin (Swelling)  penicillins (Swelling)      PAST MEDICAL & SURGICAL HISTORY:  Unspecified essential hypertension  Type II or unspecified type diabetes mellitus without mention of complication, not stated as uncontr  S/P cardiac catheterization: 2010 (stent placement?)      Home Medications Reviewed    Hospital Medications:   MEDICATIONS  (STANDING):      SOCIAL HISTORY:  Denies ETOh, Smoking,     FAMILY HISTORY:  No pertinent family history in first degree relatives      REVIEW OF SYSTEMS:  CONSTITUTIONAL: No weakness, fevers or chills  EYES/ENT: No visual changes;  No vertigo or throat pain   NECK: No pain or stiffness  RESPIRATORY: No cough, wheezing, hemoptysis; No shortness of breath  CARDIOVASCULAR: No chest pain or palpitations.  GASTROINTESTINAL: No abdominal or epigastric pain. No nausea, vomiting, or hematemesis; No diarrhea or constipation. No melena or hematochezia.  GENITOURINARY: No dysuria, frequency, foamy urine, urinary urgency, incontinence or hematuria  NEUROLOGICAL: No numbness or weakness  SKIN: No itching, burning, rashes, or lesions   VASCULAR: No bilateral lower extremity edema.   All other review of systems is negative unless indicated above.    VITALS:  T(F): 97.7 (11-09-18 @ 17:03), Max: 98 (11-09-18 @ 13:19)  HR: 82 (11-09-18 @ 17:03)  BP: 133/68 (11-09-18 @ 17:03)  RR: 18 (11-09-18 @ 17:03)  SpO2: 100% (11-09-18 @ 17:03)  Wt(kg): --        PHYSICAL EXAM:  Constitutional: NAD  HEENT: anicteric sclera, oropharynx clear, MMM  Neck: No JVD  Respiratory: CTAB, no wheezes, rales or rhonchi  Cardiovascular: S1, S2, RRR  Gastrointestinal: BS+, soft, NT/ND  Extremities: No cyanosis or clubbing. No peripheral edema  Neurological: A/O x 3, no focal deficits  Psychiatric: Normal mood, normal affect  : No CVA tenderness. No solitario.   Skin: No rashes      LABS:  11-09    137  |  99  |  18  ----------------------------<  116<H>  4.2   |  24  |  1.56<H>    Ca    9.1      09 Nov 2018 15:00    TPro  7.2  /  Alb  4.0  /  TBili  < 0.2<L>  /  DBili      /  AST  20  /  ALT  14  /  AlkPhos  72  11-09    Creatinine Trend: 1.56 <--                        12.0   6.07  )-----------( 251      ( 09 Nov 2018 15:10 )             39.8     Urine Studies:        RADIOLOGY & ADDITIONAL STUDIES:

## 2018-11-10 NOTE — H&P ADULT - GASTROINTESTINAL DETAILS
soft/no masses palpable/bowel sounds normal/no bruit/no rigidity/no guarding/no distention/nontender

## 2018-11-10 NOTE — DISCHARGE NOTE ADULT - ADDITIONAL INSTRUCTIONS
Follow up with your Primary Care Doctor.  Follow up with your Nephrologist Dr. Cuevas.  You are leaving the hospital against medical advice.

## 2018-11-10 NOTE — H&P ADULT - HISTORY OF PRESENT ILLNESS
58M, history of DM2, HTN, Dyslipidemia, CAD, hypoglycemia, states he was sitting on the bed watching TV, had blurry vision, dizziness, mouth numbness prior to passing out. Report he's sugar has been low for the past week, 40-60's, only taking Insulin 1-2 times this week, admits taking Metformin once daily instead of twice daily. Admits he woke up 3am and got ready to work, came back around 8AM for break, had breakfast, beef faina x2, coffee with sugar, lollipop. Admits having similar episode in the past w/ low sugar. Denies any headache, dizziness, visual disturbances, chest pain, sob, back pain, abdominal pain, hematuria, leg swelling, chills, diaphoresis

## 2018-11-10 NOTE — PROGRESS NOTE ADULT - PROBLEM SELECTOR PLAN 1
baseline ~ 1.6   ckd sec to DM/HTN  renal function slightly worsen today. continue to monitor for now  monitor bmp  check UA  avoid nephrotoxic agents.

## 2018-11-10 NOTE — ED ADULT NURSE REASSESSMENT NOTE - NS ED NURSE REASSESS COMMENT FT1
Pt was visited by Tele PA and attending, pt to leave AMA, form signed and in chart. Pt has no further medical complaints at this time. IV removed.

## 2018-11-10 NOTE — H&P ADULT - PROBLEM SELECTOR PLAN 3
Renal consult appreciated.   CKD (chronic kidney disease) stage 3, GFR 30-59 ml/min. Recommendation: baseline ~ 1.5-1.6  ckd sec to DM/HTN  renal function stable at baseline  monitor bmp  check UA  avoid nephrotoxic agents.

## 2018-12-09 NOTE — ED PROVIDER NOTE - NEUROLOGICAL STRAIGHT LEG RAISE
Reason For Visit  KENDALL TALBOT is here today for a nurse visit for TB Placement.      Allergies  No Known Drug Allergies    Screening  TB Screening:   See scanned screening form in the patients chart.   Screening questions and answeres reviewed by the Provider.        Assessment   1. PPD screening test (Z11.1)    Plan   1. PPD, tuberculin skin test; purified protein derivative solution, intradermal    Patient Instructions TST placed L forearm, 8-24-18, 6:40 pm.   Return to Clinic for TB reading, Monday, 8-27-18 between 9:00 am and 6:40 pm.      Signatures   Electronically signed by : AMBAR WYLIE NP; Aug 24 2018  6:43PM CST     normal bilaterally

## 2019-12-02 NOTE — ED CDU PROVIDER INITIAL DAY NOTE - PMH
Diabetes    Type II or unspecified type diabetes mellitus without mention of complication, not stated as uncontr    Unspecified essential hypertension 0.5

## 2020-01-06 NOTE — PROGRESS NOTE ADULT - PROBLEM/PLAN-3
Father called stating Oral Surgeon needs medical records .Please fax to [phone] - [facility].    DISPLAY PLAN FREE TEXT

## 2020-01-13 NOTE — H&P ADULT - PMH
Type II or unspecified type diabetes mellitus without mention of complication, not stated as uncontr    Unspecified essential hypertension Strong peripheral pulses

## 2021-01-06 NOTE — ED ADULT NURSE NOTE - PRIMARY CARE PROVIDER
unk
POST-OP DIAGNOSIS:  Breast cancer, right 06-Jan-2021 15:41:37  Alfonzo Casillas  
POST-OP DIAGNOSIS:  Breast cancer, right 06-Jan-2021 15:41:37  Alfonzo Casillas

## 2021-06-04 PROBLEM — E16.2 HYPOGLYCEMIA, UNSPECIFIED: Chronic | Status: ACTIVE | Noted: 2018-11-09

## 2021-06-04 PROBLEM — E78.5 HYPERLIPIDEMIA, UNSPECIFIED: Chronic | Status: ACTIVE | Noted: 2018-11-09

## 2021-06-04 PROBLEM — I25.10 ATHEROSCLEROTIC HEART DISEASE OF NATIVE CORONARY ARTERY WITHOUT ANGINA PECTORIS: Chronic | Status: ACTIVE | Noted: 2018-11-09

## 2021-06-14 ENCOUNTER — APPOINTMENT (OUTPATIENT)
Dept: OPHTHALMOLOGY | Facility: CLINIC | Age: 61
End: 2021-06-14
Payer: MEDICAID

## 2021-06-14 ENCOUNTER — NON-APPOINTMENT (OUTPATIENT)
Age: 61
End: 2021-06-14

## 2021-06-14 PROCEDURE — 92025 CPTRIZED CORNEAL TOPOGRAPHY: CPT

## 2021-06-14 PROCEDURE — 92004 COMPRE OPH EXAM NEW PT 1/>: CPT

## 2021-06-14 PROCEDURE — 92250 FUNDUS PHOTOGRAPHY W/I&R: CPT

## 2021-06-14 PROCEDURE — 92286 ANT SGM IMG I&R SPECLR MIC: CPT

## 2021-06-14 PROCEDURE — 92136 OPHTHALMIC BIOMETRY: CPT

## 2021-07-23 ENCOUNTER — APPOINTMENT (OUTPATIENT)
Dept: OPHTHALMOLOGY | Facility: CLINIC | Age: 61
End: 2021-07-23

## 2021-07-30 ENCOUNTER — APPOINTMENT (OUTPATIENT)
Dept: OPHTHALMOLOGY | Facility: CLINIC | Age: 61
End: 2021-07-30

## 2021-09-02 ENCOUNTER — APPOINTMENT (OUTPATIENT)
Dept: OPHTHALMOLOGY | Facility: CLINIC | Age: 61
End: 2021-09-02

## 2021-09-29 ENCOUNTER — APPOINTMENT (OUTPATIENT)
Dept: OPHTHALMOLOGY | Facility: CLINIC | Age: 61
End: 2021-09-29

## 2021-10-01 ENCOUNTER — APPOINTMENT (OUTPATIENT)
Dept: OPHTHALMOLOGY | Facility: CLINIC | Age: 61
End: 2021-10-01

## 2021-10-04 ENCOUNTER — APPOINTMENT (OUTPATIENT)
Dept: OPHTHALMOLOGY | Facility: CLINIC | Age: 61
End: 2021-10-04
Payer: MEDICAID

## 2021-10-04 ENCOUNTER — NON-APPOINTMENT (OUTPATIENT)
Age: 61
End: 2021-10-04

## 2021-10-04 PROCEDURE — 92014 COMPRE OPH EXAM EST PT 1/>: CPT

## 2021-10-04 PROCEDURE — 76512 OPH US DX B-SCAN: CPT | Mod: RT

## 2021-10-04 PROCEDURE — 92134 CPTRZ OPH DX IMG PST SGM RTA: CPT

## 2021-10-06 ENCOUNTER — TRANSCRIPTION ENCOUNTER (OUTPATIENT)
Age: 61
End: 2021-10-06

## 2021-10-07 ENCOUNTER — APPOINTMENT (OUTPATIENT)
Dept: OPHTHALMOLOGY | Facility: AMBULATORY SURGERY CENTER | Age: 61
End: 2021-10-07

## 2021-10-07 ENCOUNTER — OUTPATIENT (OUTPATIENT)
Dept: OUTPATIENT SERVICES | Facility: HOSPITAL | Age: 61
LOS: 1 days | Discharge: HOME | End: 2021-10-07
Payer: MEDICAID

## 2021-10-07 PROCEDURE — 66985 INSERT LENS PROSTHESIS: CPT | Mod: RT

## 2021-10-07 PROCEDURE — 66852 REMOVAL OF LENS MATERIAL: CPT | Mod: RT

## 2021-10-08 ENCOUNTER — NON-APPOINTMENT (OUTPATIENT)
Age: 61
End: 2021-10-08

## 2021-10-08 ENCOUNTER — APPOINTMENT (OUTPATIENT)
Dept: OPHTHALMOLOGY | Facility: CLINIC | Age: 61
End: 2021-10-08
Payer: MEDICAID

## 2021-10-08 PROCEDURE — 99024 POSTOP FOLLOW-UP VISIT: CPT

## 2021-10-15 ENCOUNTER — APPOINTMENT (OUTPATIENT)
Dept: OPHTHALMOLOGY | Facility: CLINIC | Age: 61
End: 2021-10-15
Payer: MEDICAID

## 2021-10-15 ENCOUNTER — NON-APPOINTMENT (OUTPATIENT)
Age: 61
End: 2021-10-15

## 2021-10-15 PROCEDURE — 99024 POSTOP FOLLOW-UP VISIT: CPT

## 2021-10-29 ENCOUNTER — NON-APPOINTMENT (OUTPATIENT)
Age: 61
End: 2021-10-29

## 2021-10-29 ENCOUNTER — APPOINTMENT (OUTPATIENT)
Dept: OPHTHALMOLOGY | Facility: CLINIC | Age: 61
End: 2021-10-29
Payer: MEDICAID

## 2021-10-29 PROCEDURE — 99024 POSTOP FOLLOW-UP VISIT: CPT

## 2021-10-29 PROCEDURE — 92134 CPTRZ OPH DX IMG PST SGM RTA: CPT

## 2021-11-18 ENCOUNTER — APPOINTMENT (OUTPATIENT)
Dept: OPHTHALMOLOGY | Facility: CLINIC | Age: 61
End: 2021-11-18

## 2021-12-03 ENCOUNTER — APPOINTMENT (OUTPATIENT)
Dept: OPHTHALMOLOGY | Facility: CLINIC | Age: 61
End: 2021-12-03
Payer: MEDICAID

## 2021-12-03 ENCOUNTER — NON-APPOINTMENT (OUTPATIENT)
Age: 61
End: 2021-12-03

## 2021-12-03 PROCEDURE — 92012 INTRM OPH EXAM EST PATIENT: CPT | Mod: 24

## 2022-01-14 ENCOUNTER — APPOINTMENT (OUTPATIENT)
Dept: OPHTHALMOLOGY | Facility: CLINIC | Age: 62
End: 2022-01-14

## 2022-05-19 NOTE — H&P ADULT - NSHPSOURCEINFORD_GEN_ALL_CORE
OV 04/19/22. Per Dr. villasenor    8. Need for meningococcal vaccination  .  Discussed the need for meningococcal vaccination  - MENINGOCOCCAL CONJUGATE MCV4P VACC (MENACTRA)  - MENINGOCOCCAL SEROGROUP B RECOMBINANT VACC 2 DOSE (BEXSERO   Chart(s)/Patient

## 2022-10-27 NOTE — ED ADULT TRIAGE NOTE - MEANS OF ARRIVAL
Recorded by: Florian Ferro 10/27/2022 @ 1:47 PM   Time Spent: 2 minutes     Remote Patient Monitoring Note Date/Time: 10/27/2022 1:47 PM LPN reviewed patients reported daily Remote Patient Monitoring metrics. All reported metrics are within normal limits. Plan/Follow Up:  Will continue to review, monitor and address alerts with follow up based on severity of symptoms and risk factors ---- Current Patient Metrics ---- Activity: - mins Blood Pressure: 166/98, 82bpm Pulseox: 96%, 78bpm Survey: C Weight: 161.8lbs Note Created at: 10/27/2022 01:47 PM ET ---- Time-Spent: 2 minutes 0 seconds
stretcher

## 2022-12-22 NOTE — ED ADULT TRIAGE NOTE - ESI TRIAGE ACUITY LEVEL, MLM
3 Valtrex Counseling: I discussed with the patient the risks of valacyclovir including but not limited to kidney damage, nausea, vomiting and severe allergy.  The patient understands that if the infection seems to be worsening or is not improving, they are to call.

## 2023-12-13 ENCOUNTER — APPOINTMENT (OUTPATIENT)
Dept: OPHTHALMOLOGY | Facility: CLINIC | Age: 63
End: 2023-12-13

## 2024-04-30 NOTE — DISCHARGE NOTE ADULT - CARE PROVIDER_API CALL
[address] Lemus), Internal Medicine  [address] [place] [address]  Phone: [phone]  Fax: [phone]    Enrico Cuevas (CORNEL), Internal Medicine; Nephrology  [address]  [address] [place] [address]  Phone: [phone]  Fax: [phone]

## 2024-12-10 ENCOUNTER — INPATIENT (INPATIENT)
Facility: HOSPITAL | Age: 64
LOS: 1 days | Discharge: HOME | End: 2024-12-12
Attending: INTERNAL MEDICINE | Admitting: INTERNAL MEDICINE
Payer: MEDICARE

## 2024-12-10 VITALS
RESPIRATION RATE: 18 BRPM | TEMPERATURE: 98 F | WEIGHT: 179.9 LBS | HEART RATE: 90 BPM | HEIGHT: 71 IN | SYSTOLIC BLOOD PRESSURE: 167 MMHG | OXYGEN SATURATION: 100 % | DIASTOLIC BLOOD PRESSURE: 75 MMHG

## 2024-12-10 DIAGNOSIS — N17.9 ACUTE KIDNEY FAILURE, UNSPECIFIED: ICD-10-CM

## 2024-12-10 LAB
ADD ON TEST-SPECIMEN IN LAB: SIGNIFICANT CHANGE UP
ADD ON TEST-SPECIMEN IN LAB: SIGNIFICANT CHANGE UP
ALBUMIN SERPL ELPH-MCNC: 3.5 G/DL — SIGNIFICANT CHANGE UP (ref 3.3–5)
ALP SERPL-CCNC: 152 U/L — HIGH (ref 40–120)
ALT FLD-CCNC: 15 U/L — SIGNIFICANT CHANGE UP (ref 4–41)
ANION GAP SERPL CALC-SCNC: 14 MMOL/L — SIGNIFICANT CHANGE UP (ref 7–14)
ANION GAP SERPL CALC-SCNC: 19 MMOL/L — HIGH (ref 7–14)
APTT BLD: 28.7 SEC — SIGNIFICANT CHANGE UP (ref 24.5–35.6)
AST SERPL-CCNC: 20 U/L — SIGNIFICANT CHANGE UP (ref 4–40)
BILIRUB SERPL-MCNC: 0.3 MG/DL — SIGNIFICANT CHANGE UP (ref 0.2–1.2)
BLD GP AB SCN SERPL QL: NEGATIVE — SIGNIFICANT CHANGE UP
BLOOD GAS VENOUS COMPREHENSIVE RESULT: SIGNIFICANT CHANGE UP
BUN SERPL-MCNC: 48 MG/DL — HIGH (ref 7–23)
BUN SERPL-MCNC: 49 MG/DL — HIGH (ref 7–23)
CALCIUM SERPL-MCNC: 9.3 MG/DL — SIGNIFICANT CHANGE UP (ref 8.4–10.5)
CALCIUM SERPL-MCNC: 9.4 MG/DL — SIGNIFICANT CHANGE UP (ref 8.4–10.5)
CHLORIDE SERPL-SCNC: 101 MMOL/L — SIGNIFICANT CHANGE UP (ref 98–107)
CHLORIDE SERPL-SCNC: 103 MMOL/L — SIGNIFICANT CHANGE UP (ref 98–107)
CK SERPL-CCNC: 105 U/L — SIGNIFICANT CHANGE UP (ref 30–200)
CO2 SERPL-SCNC: 13 MMOL/L — LOW (ref 22–31)
CO2 SERPL-SCNC: 17 MMOL/L — LOW (ref 22–31)
CREAT SERPL-MCNC: 4.07 MG/DL — HIGH (ref 0.5–1.3)
CREAT SERPL-MCNC: 4.17 MG/DL — HIGH (ref 0.5–1.3)
EGFR: 15 ML/MIN/1.73M2 — LOW
EGFR: 16 ML/MIN/1.73M2 — LOW
FLUAV AG NPH QL: SIGNIFICANT CHANGE UP
FLUBV AG NPH QL: SIGNIFICANT CHANGE UP
GLUCOSE SERPL-MCNC: 198 MG/DL — HIGH (ref 70–99)
GLUCOSE SERPL-MCNC: 216 MG/DL — HIGH (ref 70–99)
HCT VFR BLD CALC: 29.9 % — LOW (ref 39–50)
HGB BLD-MCNC: 9.3 G/DL — LOW (ref 13–17)
INR BLD: 0.92 RATIO — SIGNIFICANT CHANGE UP (ref 0.85–1.16)
LIDOCAIN IGE QN: 26 U/L — SIGNIFICANT CHANGE UP (ref 7–60)
MAGNESIUM SERPL-MCNC: 1.9 MG/DL — SIGNIFICANT CHANGE UP (ref 1.6–2.6)
MCHC RBC-ENTMCNC: 21.8 PG — LOW (ref 27–34)
MCHC RBC-ENTMCNC: 31.1 G/DL — LOW (ref 32–36)
MCV RBC AUTO: 70 FL — LOW (ref 80–100)
NRBC # BLD: 0 /100 WBCS — SIGNIFICANT CHANGE UP (ref 0–0)
NRBC # FLD: 0.02 K/UL — HIGH (ref 0–0)
PHOSPHATE SERPL-MCNC: 3.4 MG/DL — SIGNIFICANT CHANGE UP (ref 2.5–4.5)
PLATELET # BLD AUTO: 366 K/UL — SIGNIFICANT CHANGE UP (ref 150–400)
POTASSIUM SERPL-MCNC: 5 MMOL/L — SIGNIFICANT CHANGE UP (ref 3.5–5.3)
POTASSIUM SERPL-MCNC: 5.6 MMOL/L — HIGH (ref 3.5–5.3)
POTASSIUM SERPL-SCNC: 5 MMOL/L — SIGNIFICANT CHANGE UP (ref 3.5–5.3)
POTASSIUM SERPL-SCNC: 5.6 MMOL/L — HIGH (ref 3.5–5.3)
PROT SERPL-MCNC: 8.2 G/DL — SIGNIFICANT CHANGE UP (ref 6–8.3)
PROTHROM AB SERPL-ACNC: 11 SEC — SIGNIFICANT CHANGE UP (ref 9.9–13.4)
RBC # BLD: 4.27 M/UL — SIGNIFICANT CHANGE UP (ref 4.2–5.8)
RBC # FLD: 17.7 % — HIGH (ref 10.3–14.5)
RH IG SCN BLD-IMP: POSITIVE — SIGNIFICANT CHANGE UP
RSV RNA NPH QL NAA+NON-PROBE: SIGNIFICANT CHANGE UP
SARS-COV-2 RNA SPEC QL NAA+PROBE: SIGNIFICANT CHANGE UP
SODIUM SERPL-SCNC: 133 MMOL/L — LOW (ref 135–145)
SODIUM SERPL-SCNC: 134 MMOL/L — LOW (ref 135–145)
TROPONIN T, HIGH SENSITIVITY RESULT: 49 NG/L — SIGNIFICANT CHANGE UP
TROPONIN T, HIGH SENSITIVITY RESULT: 49 NG/L — SIGNIFICANT CHANGE UP
WBC # BLD: 6.24 K/UL — SIGNIFICANT CHANGE UP (ref 3.8–10.5)
WBC # FLD AUTO: 6.24 K/UL — SIGNIFICANT CHANGE UP (ref 3.8–10.5)

## 2024-12-10 PROCEDURE — 71046 X-RAY EXAM CHEST 2 VIEWS: CPT | Mod: 26

## 2024-12-10 PROCEDURE — 76770 US EXAM ABDO BACK WALL COMP: CPT | Mod: 26

## 2024-12-10 PROCEDURE — 99285 EMERGENCY DEPT VISIT HI MDM: CPT | Mod: GC

## 2024-12-10 PROCEDURE — 99223 1ST HOSP IP/OBS HIGH 75: CPT

## 2024-12-10 RX ORDER — SODIUM ZIRCONIUM CYCLOSILICATE 5 G/5G
10 POWDER, FOR SUSPENSION ORAL ONCE
Refills: 0 | Status: COMPLETED | OUTPATIENT
Start: 2024-12-10 | End: 2024-12-10

## 2024-12-10 RX ORDER — FUROSEMIDE 40 MG/1
40 TABLET ORAL ONCE
Refills: 0 | Status: COMPLETED | OUTPATIENT
Start: 2024-12-10 | End: 2024-12-10

## 2024-12-10 RX ADMIN — FUROSEMIDE 40 MILLIGRAM(S): 40 TABLET ORAL at 21:22

## 2024-12-10 RX ADMIN — SODIUM ZIRCONIUM CYCLOSILICATE 10 GRAM(S): 5 POWDER, FOR SUSPENSION ORAL at 21:21

## 2024-12-10 NOTE — ED PROVIDER NOTE - PHYSICAL EXAMINATION
General: well appearing, interactive, well nourished, no apparent distress, ncat  HEENT: EOMI, PERRLA, normal mucosa, normal oropharynx, no lesions on the lips or on oral mucosa, normal external ear  Neck: supple, no lymphadenopathy, full range of motion, no nuchal rigidity  CV: RRR, normal S1 and S2 with no murmur, capillary refill less than two seconds  Resp: bilateral lower lobes intermittent wheeze with questionable crackles  Abd: non-distended, soft, non-tender  : no CVA tenderness  MSK: full range of motion, no cyanosis, no edema, no clubbing, no immobility  Neuro: CN II-XII grossly intact, muscle strength 5/5 in all extremities, normal gait  Skin: no rashes, skin intact

## 2024-12-10 NOTE — CONSULT NOTE ADULT - SUBJECTIVE AND OBJECTIVE BOX
[facility]   MD MELANIE VÁSQUEZ MD ANGELA WONG, PA        TEL:  OFFICE: [phone]  From 5pm-7am answering service [phone]    --- INITIAL RENAL CONSULT NOTE ---date of service 12-10-24 @ 18:22    HPI:  64 hx of htn, id diabetes with a cc of shortness of breath for 2 weeks. Patient states 2 weeks prior patient ate vegetables from his wife and had a onset of shortness of breath, n/v/f/c. Patient also endorsing decreased appetite and diarhhea. Patient denying chest pain, headaches, neuro changes, stool changes or urinary changes. Patient states he felt this prior when he went to [facility] 1 yr prior and states he was given a medicine to help him pee and made him feel better  pt seen dr narvaez for ckd last visit in 2017 has not been following up since. last scr in office 1.9 GFR 44      Allergies:  penicillins (Swelling)  ampicillin (Swelling)      PAST MEDICAL & SURGICAL HISTORY:  Type II or unspecified type diabetes mellitus without mention of complication, not stated as uncontr      Unspecified essential hypertension      CAD (coronary artery disease)      Hypoglycemia      HLD (hyperlipidemia)      S/P cardiac catheterization  2010 (stent placement?)          Home Medications Reviewed    Hospital Medications:   MEDICATIONS  (STANDING):      SOCIAL HISTORY:  Denies ETOh, +Smoking,     FAMILY HISTORY:  No pertinent family history in first degree relatives        REVIEW OF SYSTEMS:  CONSTITUTIONAL: No weakness, fevers or chills  EYES/ENT: No visual changes;  No vertigo or throat pain   NECK: No pain or stiffness  RESPIRATORY:+ shortness of breath  CARDIOVASCULAR: No chest pain or palpitations.  GASTROINTESTINAL: No abdominal or epigastric pain. No nausea, vomiting, or hematemesis; No diarrhea or constipation. No melena or hematochezia.  GENITOURINARY: No dysuria, frequency, foamy urine, urinary urgency, incontinence or hematuria  NEUROLOGICAL: No numbness or weakness  SKIN: No itching, burning, rashes, or lesions   VASCULAR: No bilateral lower extremity edema.   All other review of systems is negative unless indicated above.    VITALS:  T(F): 98.1 (12-10-24 @ 16:18), Max: 98.1 (12-10-24 @ 16:18)  HR: 86 (12-10-24 @ 17:19)  BP: 162/93 (12-10-24 @ 17:19)  RR: 18 (12-10-24 @ 17:19)  SpO2: 100% (12-10-24 @ 17:19)  Wt(kg): --    Height (cm): 180.3 (12-10 @ 14:23)  Weight (kg): 81.6 (12-10 @ 14:23)  BMI (kg/m2): 25.1 (12-10 @ 14:23)  BSA (m2): 2.02 (12-10 @ 14:23)    PHYSICAL EXAM:  General: NAD  HEENT: anicteric sclera, oropharynx clear, MMM  Neck: No JVD  Respiratory: CTAB, no wheezes, rales or rhonchi  Cardiovascular: S1, S2, RRR  Gastrointestinal: BS+, soft, NT/ND  Extremities: No cyanosis or clubbing. No peripheral edema  Neurological: A/O x 3, no focal deficits  Psychiatric: Normal mood, normal affect  : No CVA tenderness. No solitario.   Skin: No rashes  Vascular Access:    LABS:  12-10    133[L]  |  101  |  49[H]  ----------------------------<  216[H]  5.6[H]   |  13[L]  |  4.07[H]    Ca    9.3      10 Dec 2024 17:26  Phos  3.4     12-10  Mg     1.90     12-10    TPro  8.2  /  Alb  3.5  /  TBili  0.3  /  DBili      /  AST  20  /  ALT  15  /  AlkPhos  152[H]  12-10    Creatinine Trend: 4.07 <--    Urine Studies:  Urinalysis Basic - ( 10 Dec 2024 17:26 )    Color:  / Appearance:  / SG:  / pH:   Gluc: 216 mg/dL / Ketone:   / Bili:  / Urobili:    Blood:  / Protein:  / Nitrite:    Leuk Esterase:  / RBC:  / WBC    Sq Epi:  / Non Sq Epi:  / Bacteria:           RADIOLOGY & ADDITIONAL STUDIES:

## 2024-12-10 NOTE — CONSULT NOTE ADULT - ASSESSMENT
64 hx of htn, id diabetes with a cc of shortness of breath for 2 weeks. Patient states 2 weeks prior patient ate vegetables from his wife and had a onset of shortness of breath, n/v/f/c. Patient also endorsing decreased appetite and diarhhea. Patient denying chest pain, headaches, neuro changes, stool changes or urinary changes. Patient states he felt this prior when he went to [facility] 1 yr prior and states he was given a medicine to help him pee and made him feel better  pt seen dr narvaez for ckd last visit in 2017 has not been following up since. last scr in office 1.9 GFR 44    acute on ckd stage 3 vs progression of ckd  follow up with dr narvaez last vist scr 1.9 in 2017 has not 64 hx of htn, id diabetes with a cc of shortness of breath for 2 weeks. Patient states 2 weeks prior patient ate vegetables from his wife and had a onset of shortness of breath, n/v/f/c. Patient also endorsing decreased appetite and diarhhea. Patient denying chest pain, headaches, neuro changes, stool changes or urinary changes. Patient states he felt this prior when he went to [facility] 1 yr prior and states he was given a medicine to help him pee and made him feel better  pt seen dr narvaez for ckd last visit in 2017 has not been following up since. last scr in office 1.9 GFR 44    acute on ckd stage 3 vs progression of ckd  follow up with dr narvaez last vist scr 1.9 in 2017 has not follow up since  now scr 4.07  ? JORGITO vs progression  check renal us  check ua urine p/c urine cr, urine urea  hold ARB/HCTZ  avoid nephrotoxic agent    hyponatremia  sec to hyperglycemia  optimize dm control  monitor    hyperkalemia  sec to renal failure and hyperglycemia  lokelma 10x1  hold arb  optimize dm control  low k diet  monitor    sob  not clinically overloaded  chest xray clear  r/o URI   r/o cardiac etiology 64 hx of htn, id diabetes with a cc of shortness of breath for 2 weeks. Patient states 2 weeks prior patient ate vegetables from his wife and had a onset of shortness of breath, n/v/f/c. Patient also endorsing decreased appetite and diarhhea. Patient denying chest pain, headaches, neuro changes, stool changes or urinary changes. Patient states he felt this prior when he went to [facility] 1 yr prior and states he was given a medicine to help him pee and made him feel better  pt seen dr narvaez for ckd last visit in 2017 has not been following up since. last scr in office 1.9 GFR 44    acute on ckd stage 3 vs progression of ckd  follow up with dr narvaez last vist scr 1.9   now scr 4.07  ? JORGITO vs progression  check renal us  check ua urine p/c urine cr, urine urea  hold ARB/HCTZ  avoid nephrotoxic agent    hyponatremia  sec to hyperglycemia  optimize dm control  monitor    hyperkalemia  sec to renal failure and hyperglycemia  lokelma 10x1  hold arb  optimize dm control  low k diet  monitor    sob  not clinically overloaded  chest xray clear  r/o URI   r/o cardiac etiology

## 2024-12-10 NOTE — H&P ADULT - PROBLEM SELECTOR PLAN 3
Hyperglycemic. Pt states he takes lantus 30 units QAM. He states he has been skipping his insulin due to poor PO intake.  - start lantus 25 units QAM, low dose ISS

## 2024-12-10 NOTE — H&P ADULT - PROBLEM SELECTOR PLAN 4
Microcytic, Hb 9.3, last Hb 11.1 in 2018. Pt denies any bleeding/black stools. Possibly in setting of CKD.  - continue to monitor - hold losartan/hctz in setting of JORGITO.   - c/w home metoprolol and amlodipine

## 2024-12-10 NOTE — H&P ADULT - PROBLEM SELECTOR PLAN 1
Cr elevated to 4.17, previously 1.79 in 2018. Pt w/ normal renal U/S. Unclear etiology, possibly pre-renal, intrinsic. Hyperkalemia resolved s/p lokelma  - f/u urine studies  - f/u nephro recs

## 2024-12-10 NOTE — H&P ADULT - NSHPPOADEEPVENOUSTHROMB_GEN_A_CORE
How Severe Is Your Skin Lesion?: mild Has Your Skin Lesion Been Treated?: not been treated Is This A New Presentation, Or A Follow-Up?: Skin Lesion Additional History: Patient has a few raised lesions along her sternum she would like evaluated as well. no

## 2024-12-10 NOTE — ED PROVIDER NOTE - ATTENDING CONTRIBUTION TO CARE
The patient is a 64y Male who has a past medical and surgery history of HTN HLD CAD DM c/b Hypoglycemia Prostate CA awaiting experimental tx for bone mets PTED with Pt c/o difficulty breathing over the past two weeks as descibed     Vital Signs Last 24 Hrs  T(F): 98.1 HR: 95 BP: 170/85 RR: 18 SpO2: 100%  PE: as described; my additions and exceptions are noted in the chart    DATA:  EKG: pending at time of evaluation 11/9/18   Diagnosis Line Normal sinus rhythm  Possible Left atrial enlargement    Septal infarct , age undetermined  ST & T wave abnormality, consider lateral ischemia  Abnormal ECG    DATA:  EKG: Left anterior fascicular block unchanged from prior  LAB: Blood Gas Venous - Lactate: 1.3 mmol/L (12-10-24 @ 17:10)                        9.3    6.24  )-----------( 366      ( 10 Dec 2024 18:30 )             29.9   Mean Cell Volume: 70.0 fL (12-10-24 @ 18:30)  PT/INR - ( 10 Dec 2024 17:26 )   PT: 11.0 sec;   INR: 0.92 ratio         PTT - ( 10 Dec 2024 17:26 )  PTT:28.7 sec12-10    134[L]  |  103  |  48[H]  ----------------------------<  198[H]  5.0   |  17[L]  |  4.17[H]    Ca    9.4      10 Dec 2024 18:33  Phos  3.4     12-10  Mg     1.90     12-10  TPro  8.2  /  Alb  3.5  /  TBili  0.3  /  DBili  x   /  AST  20  /  ALT  15  /  AlkPhos  152[H]  12-10  Lipase: 26 U/L (12-10-24 @ 17:26)  Lipase: 26 U/L (12-10-24 @ 17:26)  Troponin T, High Sensitivity Result: 49 ng/L (12-10-24 @ 18:19)  Troponin T, High Sensitivity Result: 49 ng/L (12-10-24 @ 17:26)    IMPRESSION/RISK:  Dx= JORGITO  Consideration include anorexia a component also UA + for UTI will need antibiotics   Plan  furosemide   Injectable 40 milliGRAM(s) IV Push antibioitcs TBA for followup of culture and furher JORGITO w/u renal aware of patient The patient is a 64y Male who has a past medical and surgery history of HTN HLD CAD DM c/b Hypoglycemia PTED with Pt c/o difficulty breathing over the past two weeks as described     Vital Signs Last 24 Hrs  T(F): 98.1 HR: 95 BP: 170/85 RR: 18 SpO2: 100%  PE: as described; my additions and exceptions are noted in the chart    DATA:  EKG: pending at time of evaluation 11/9/18   Diagnosis Line Normal sinus rhythm  Possible Left atrial enlargement    Septal infarct , age undetermined  ST & T wave abnormality, consider lateral ischemia  Abnormal ECG    DATA:  EKG: Left anterior fascicular block unchanged from prior  LAB: Blood Gas Venous - Lactate: 1.3 mmol/L (12-10-24 @ 17:10)                        9.3    6.24  )-----------( 366      ( 10 Dec 2024 18:30 )             29.9   Mean Cell Volume: 70.0 fL (12-10-24 @ 18:30)  PT/INR - ( 10 Dec 2024 17:26 )   PT: 11.0 sec;   INR: 0.92 ratio         PTT - ( 10 Dec 2024 17:26 )  PTT:28.7 sec12-10    134[L]  |  103  |  48[H]  ----------------------------<  198[H]  5.0   |  17[L]  |  4.17[H]    Ca    9.4      10 Dec 2024 18:33  Phos  3.4     12-10  Mg     1.90     12-10  TPro  8.2  /  Alb  3.5  /  TBili  0.3  /  DBili  x   /  AST  20  /  ALT  15  /  AlkPhos  152[H]  12-10  Lipase: 26 U/L (12-10-24 @ 17:26)  Lipase: 26 U/L (12-10-24 @ 17:26)  Troponin T, High Sensitivity Result: 49 ng/L (12-10-24 @ 18:19)  Troponin T, High Sensitivity Result: 49 ng/L (12-10-24 @ 17:26)    IMPRESSION/RISK:  Dx= JORGITO  Consideration include anorexia a component also UA + for UTI will need antibiotics   Plan  furosemide   Injectable 40 milliGRAM(s) IV Push antibioitcs TBA for followup of culture and furher JORGITO w/u renal aware of patient

## 2024-12-10 NOTE — ED ADULT NURSE NOTE - OBJECTIVE STATEMENT
A&Ox4. ambulatory. c/o nausea, vomiting and ABD pain after eating fried food late last night. PT states symptoms have been similar for 2 weeks and is a diabetic type 2 on insulin. NAD. pt denies SOB, chest pain, dizziness, weakness, urinary symptoms, HA, n/v/d, fevers, chills, pain. respirations are even and un labored. ABD is soft and non tender. skin intact. 20g placed to LAC. labs drawn and sent. safety precautions maintained.

## 2024-12-10 NOTE — H&P ADULT - TIME BILLING
I have spent a total of 78 minutes time spent to prepare to see the patient, obtaining and reviewing history, physical examination, explaining the diagnosis, prognosis and treatment plan with the patient/family/caregiver. I also have spent the time ordering studies and testing, interpreting results, medicine reconciliation, subspecialty consultation and documentation as above.

## 2024-12-10 NOTE — ED PROVIDER NOTE - CARE PLAN
1 Principal Discharge DX:	JORGITO (acute kidney injury)  Secondary Diagnosis:	Volume overload  Secondary Diagnosis:	Hyperkalemia

## 2024-12-10 NOTE — ED PROVIDER NOTE - CLINICAL SUMMARY MEDICAL DECISION MAKING FREE TEXT BOX
64 hx of htn, id diabetes with a cc of shortness of breath for 2 weeks. bilateral lower lobes crackles and wheezing (mild). CHF vs viral URI vs anemia vs pneumonia. Symptoms are viral in nature however shortness of breath with lung findings concerning for CHF. Will obtain labs, trops, bnp, cxray and reassess. 64 hx of htn, id diabetes with a cc of shortness of breath for 2 weeks. bilateral lower lobes crackles and wheezing (mild). CHF vs viral URI vs anemia vs pneumonia. Symptoms are viral in nature however shortness of breath with lung findings concerning for CHF vs CKD fluid overload. Will obtain labs, trops, bnp, cxray and reassess.

## 2024-12-10 NOTE — H&P ADULT - NSICDXPASTMEDICALHX_GEN_ALL_CORE_FT
PAST MEDICAL HISTORY:  CAD (coronary artery disease)     HLD (hyperlipidemia)     Hypoglycemia     Type II or unspecified type diabetes mellitus without mention of complication, not stated as uncontr     Unspecified essential hypertension

## 2024-12-10 NOTE — H&P ADULT - ASSESSMENT
Pt is a 65 y/o M w/ pmhx oh HTN, IDDM, CKD, presenting with complaint of SOB x 2 weeks. Pt found to have JORGITO on CKD and admitted for further eval and management.

## 2024-12-10 NOTE — H&P ADULT - HISTORY OF PRESENT ILLNESS
Pt is a 65 y/o M w/ pmhx oh HTN, IDDM, CKD, presenting with complaint of SOB x 2 weeks, Pt states symptoms initially started after he ate fried vegetables 2 weeks ago. He states he had 1 episode of nbnb emesis at that time. He states he had intermittent fevers at night and overall did not feel well since. He states he had a few episode of nonbloody diarrhea as well which has since resolved. He states he now gets SOB after walking a few steps. He denies any leg swelling, cough, or orthopnea. He reports overall poor PO intake the past week and has been skipping his insulin and home medications since not feeling well.     In ED, vitals T97.9 , HR 90, /75, RR 18, O2 sat 100% on RA  Labs significant for: Hb 9.3, BUN/Cr 48/4.17, glucose 198   EKG personally reviewed: sinus rhythm w/ PACs, no acute ischemic changes  CXR: clear lungs  Imaging: U/S kidneys: normal renal u/s  ED management: lasix 40 mg PO x1, IV lasix 40 mg x1, lokelma 10 mg x1

## 2024-12-10 NOTE — H&P ADULT - PROBLEM SELECTOR PLAN 2
Pt presenting w/ 2 weeks of intermittent SOB. [place]-bnp elevated to [address] however pt does not appear overloaded on exam. No LE edema, lungs clear, CXR w/o pulm edema. However given elevated pro-bnp concern for possible chf, low concern for PE.  - monitor on tele  - f/u TTE Pt presenting w/ 2 weeks of intermittent SOB. pro-bnp elevated to [address] however pt does not appear overloaded on exam. No LE edema, lungs clear, CXR w/o pulm edema. However given elevated pro-bnp concern for possible chf, low concern for PE. S/p lasix in ED  - monitor on tele  - f/u TTE  - monitor volume status

## 2024-12-10 NOTE — ED PROVIDER NOTE - OBJECTIVE STATEMENT
64 hx of htn, id diabetes with a cc of shortness of breath for 2 weeks. Patient states 2 weeks prior patient ate vegetables from his wife and had a onset of shortness of breath, n/v/f/c. Patient also endorsing decreased appetite and diarhhea. Patient denying chest pain, headaches, neuro changes, stool changes or urinary changes. Patient states he felt this prior when he went to [facility] 1 yr prior and states he was given a medicine to help him pee and made him feel better.

## 2024-12-10 NOTE — ED ADULT NURSE NOTE - CHIEF COMPLAINT QUOTE
Pt c/o difficulty breathing beginning last week. having difficulty eating no appetite, fever, body aches, vomiting, dizziness x 1 week. no chest pain, abdominal pain. no respiratory distress noted at this time. appears uncomfortable. hx. DM, CAD, HLD

## 2024-12-10 NOTE — H&P ADULT - NSHPPHYSICALEXAM_GEN_ALL_CORE
VITAL SIGNS:  T(C): 36.6 (12-11-24 @ 01:25), Max: 36.9 (12-11-24 @ 00:00)  T(F): 97.9 (12-11-24 @ 01:25), Max: 98.5 (12-11-24 @ 00:00)  HR: 93 (12-11-24 @ 01:25) (86 - 97)  BP: 166/75 (12-11-24 @ 01:25) (153/77 - 175/82)  BP(mean): --  RR: 17 (12-11-24 @ 01:25) (17 - 18)  SpO2: 97% (12-11-24 @ 01:25) (97% - 100%)  Wt(kg): --    PHYSICAL EXAM:    Constitutional: resting comfortably in bed; NAD  Head: NC/AT  Eyes: PERRL, EOMI, anicteric sclera  ENT: no nasal discharge; uvula midline, no oropharyngeal erythema or exudates; MMM  Neck: supple  Respiratory: CTA B/L; no W/R/R, no retractions  Cardiac: +S1/S2; RRR; no M/R/G  Gastrointestinal: abdomen soft, NT/ND; no rebound or guarding; +BSx4  Back: spine midline, no bony tenderness  Extremities: WWP, no clubbing or cyanosis; no peripheral edema  Musculoskeletal: NROM x4; no joint swelling, tenderness or erythema  Vascular: distal pulses intact  Dermatologic: skin warm, dry and intact; no rashes  Lymphatic: no submandibular or cervical LAD  Neurologic: AAOx3; moves all 4 extremities  Psychiatric: affect and characteristics of appearance, verbalizations, behaviors are appropriate

## 2024-12-10 NOTE — ED ADULT NURSE REASSESSMENT NOTE - NS ED NURSE REASSESS COMMENT FT1
Pt  received alert and awake able to make needs known. pt noted with color good with respirations even and non-labored on room air. Pt denies chest pain or discomfort.  pt normal sinus rhythm on the monitor.  No s/s of distress noted.

## 2024-12-10 NOTE — H&P ADULT - PROBLEM SELECTOR PLAN 5
DVT prophylaxis: hep subq  Diet: dasht/tlc, cc, low K Microcytic, Hb 9.3, last Hb 11.1 in 2018. Pt denies any bleeding/black stools. Possibly in setting of CKD.  - continue to monitor

## 2024-12-10 NOTE — H&P ADULT - NSHPLABSRESULTS_GEN_ALL_CORE
.  LABS:                         9.3    6.24  )-----------( 366      ( 10 Dec 2024 18:30 )             29.9     12-10    134[L]  |  103  |  48[H]  ----------------------------<  198[H]  5.0   |  17[L]  |  4.17[H]    Ca    9.4      10 Dec 2024 18:33  Phos  3.4     12-10  Mg     1.90     12-10    TPro  8.2  /  Alb  3.5  /  TBili  0.3  /  DBili  x   /  AST  20  /  ALT  15  /  AlkPhos  152[H]  12-10    PT/INR - ( 10 Dec 2024 17:26 )   PT: 11.0 sec;   INR: 0.92 ratio         PTT - ( 10 Dec 2024 17:26 )  PTT:28.7 sec  Urinalysis Basic - ( 10 Dec 2024 18:33 )    Color: x / Appearance: x / SG: x / pH: x  Gluc: 198 mg/dL / Ketone: x  / Bili: x / Urobili: x   Blood: x / Protein: x / Nitrite: x   Leuk Esterase: x / RBC: x / WBC x   Sq Epi: x / Non Sq Epi: x / Bacteria: x                RADIOLOGY, EKG & ADDITIONAL TESTS: Reviewed.

## 2024-12-11 ENCOUNTER — RESULT REVIEW (OUTPATIENT)
Age: 64
End: 2024-12-11

## 2024-12-11 DIAGNOSIS — Z29.9 ENCOUNTER FOR PROPHYLACTIC MEASURES, UNSPECIFIED: ICD-10-CM

## 2024-12-11 DIAGNOSIS — D64.9 ANEMIA, UNSPECIFIED: ICD-10-CM

## 2024-12-11 DIAGNOSIS — I10 ESSENTIAL (PRIMARY) HYPERTENSION: ICD-10-CM

## 2024-12-11 DIAGNOSIS — E11.9 TYPE 2 DIABETES MELLITUS WITHOUT COMPLICATIONS: ICD-10-CM

## 2024-12-11 DIAGNOSIS — R06.02 SHORTNESS OF BREATH: ICD-10-CM

## 2024-12-11 DIAGNOSIS — N17.9 ACUTE KIDNEY FAILURE, UNSPECIFIED: ICD-10-CM

## 2024-12-11 LAB
A1C WITH ESTIMATED AVERAGE GLUCOSE RESULT: 7.5 % — HIGH (ref 4–5.6)
ADD ON TEST-SPECIMEN IN LAB: SIGNIFICANT CHANGE UP
ANION GAP SERPL CALC-SCNC: 17 MMOL/L — HIGH (ref 7–14)
BASOPHILS # BLD AUTO: 0.06 K/UL — SIGNIFICANT CHANGE UP (ref 0–0.2)
BASOPHILS NFR BLD AUTO: 0.8 % — SIGNIFICANT CHANGE UP (ref 0–2)
BLD GP AB SCN SERPL QL: NEGATIVE — SIGNIFICANT CHANGE UP
BUN SERPL-MCNC: 52 MG/DL — HIGH (ref 7–23)
CALCIUM SERPL-MCNC: 9.4 MG/DL — SIGNIFICANT CHANGE UP (ref 8.4–10.5)
CHLORIDE SERPL-SCNC: 105 MMOL/L — SIGNIFICANT CHANGE UP (ref 98–107)
CO2 SERPL-SCNC: 16 MMOL/L — LOW (ref 22–31)
CREAT SERPL-MCNC: 4.28 MG/DL — HIGH (ref 0.5–1.3)
EGFR: 15 ML/MIN/1.73M2 — LOW
EOSINOPHIL # BLD AUTO: 0.47 K/UL — SIGNIFICANT CHANGE UP (ref 0–0.5)
EOSINOPHIL NFR BLD AUTO: 6.5 % — HIGH (ref 0–6)
ESTIMATED AVERAGE GLUCOSE: 169 — SIGNIFICANT CHANGE UP
GLUCOSE BLDC GLUCOMTR-MCNC: 138 MG/DL — HIGH (ref 70–99)
GLUCOSE BLDC GLUCOMTR-MCNC: 184 MG/DL — HIGH (ref 70–99)
GLUCOSE BLDC GLUCOMTR-MCNC: 248 MG/DL — HIGH (ref 70–99)
GLUCOSE BLDC GLUCOMTR-MCNC: 264 MG/DL — HIGH (ref 70–99)
GLUCOSE BLDC GLUCOMTR-MCNC: 280 MG/DL — HIGH (ref 70–99)
GLUCOSE SERPL-MCNC: 294 MG/DL — HIGH (ref 70–99)
HCT VFR BLD CALC: 28.1 % — LOW (ref 39–50)
HGB BLD-MCNC: 8.5 G/DL — LOW (ref 13–17)
IANC: 4.72 K/UL — SIGNIFICANT CHANGE UP (ref 1.8–7.4)
IMM GRANULOCYTES NFR BLD AUTO: 0.8 % — SIGNIFICANT CHANGE UP (ref 0–0.9)
IRON SATN MFR SERPL: 21 % — SIGNIFICANT CHANGE UP (ref 14–50)
IRON SATN MFR SERPL: 36 UG/DL — LOW (ref 45–165)
LYMPHOCYTES # BLD AUTO: 0.96 K/UL — LOW (ref 1–3.3)
LYMPHOCYTES # BLD AUTO: 13.2 % — SIGNIFICANT CHANGE UP (ref 13–44)
MAGNESIUM SERPL-MCNC: 1.7 MG/DL — SIGNIFICANT CHANGE UP (ref 1.6–2.6)
MCHC RBC-ENTMCNC: 21.3 PG — LOW (ref 27–34)
MCHC RBC-ENTMCNC: 30.2 G/DL — LOW (ref 32–36)
MCV RBC AUTO: 70.4 FL — LOW (ref 80–100)
MONOCYTES # BLD AUTO: 1.01 K/UL — HIGH (ref 0–0.9)
MONOCYTES NFR BLD AUTO: 13.9 % — SIGNIFICANT CHANGE UP (ref 2–14)
NEUTROPHILS # BLD AUTO: 4.72 K/UL — SIGNIFICANT CHANGE UP (ref 1.8–7.4)
NEUTROPHILS NFR BLD AUTO: 64.8 % — SIGNIFICANT CHANGE UP (ref 43–77)
NRBC # BLD: 0 /100 WBCS — SIGNIFICANT CHANGE UP (ref 0–0)
NRBC # FLD: 0 K/UL — SIGNIFICANT CHANGE UP (ref 0–0)
PHOSPHATE SERPL-MCNC: 3.9 MG/DL — SIGNIFICANT CHANGE UP (ref 2.5–4.5)
PLATELET # BLD AUTO: 390 K/UL — SIGNIFICANT CHANGE UP (ref 150–400)
POTASSIUM SERPL-MCNC: 5 MMOL/L — SIGNIFICANT CHANGE UP (ref 3.5–5.3)
POTASSIUM SERPL-SCNC: 5 MMOL/L — SIGNIFICANT CHANGE UP (ref 3.5–5.3)
RBC # BLD: 3.99 M/UL — LOW (ref 4.2–5.8)
RBC # FLD: 17.9 % — HIGH (ref 10.3–14.5)
RH IG SCN BLD-IMP: POSITIVE — SIGNIFICANT CHANGE UP
SODIUM SERPL-SCNC: 138 MMOL/L — SIGNIFICANT CHANGE UP (ref 135–145)
TIBC SERPL-MCNC: 169 UG/DL — LOW (ref 220–430)
UIBC SERPL-MCNC: 133 UG/DL — SIGNIFICANT CHANGE UP (ref 110–370)
WBC # BLD: 7.28 K/UL — SIGNIFICANT CHANGE UP (ref 3.8–10.5)
WBC # FLD AUTO: 7.28 K/UL — SIGNIFICANT CHANGE UP (ref 3.8–10.5)

## 2024-12-11 PROCEDURE — 93306 TTE W/DOPPLER COMPLETE: CPT | Mod: 26

## 2024-12-11 RX ORDER — INSULIN GLARGINE 100 [IU]/ML
25 INJECTION, SOLUTION SUBCUTANEOUS EVERY MORNING
Refills: 0 | Status: DISCONTINUED | OUTPATIENT
Start: 2024-12-11 | End: 2024-12-12

## 2024-12-11 RX ORDER — AMLODIPINE BESYLATE 10 MG/1
10 TABLET ORAL DAILY
Refills: 0 | Status: DISCONTINUED | OUTPATIENT
Start: 2024-12-11 | End: 2024-12-12

## 2024-12-11 RX ORDER — ACETAMINOPHEN, DIPHENHYDRAMINE HCL, PHENYLEPHRINE HCL 325; 25; 5 MG/1; MG/1; MG/1
3 TABLET ORAL AT BEDTIME
Refills: 0 | Status: DISCONTINUED | OUTPATIENT
Start: 2024-12-11 | End: 2024-12-12

## 2024-12-11 RX ORDER — 0.9 % SODIUM CHLORIDE 0.9 %
1000 INTRAVENOUS SOLUTION INTRAVENOUS
Refills: 0 | Status: DISCONTINUED | OUTPATIENT
Start: 2024-12-11 | End: 2024-12-12

## 2024-12-11 RX ORDER — METOPROLOL TARTRATE 100 MG/1
50 TABLET, FILM COATED ORAL DAILY
Refills: 0 | Status: DISCONTINUED | OUTPATIENT
Start: 2024-12-11 | End: 2024-12-12

## 2024-12-11 RX ORDER — GLUCAGON INJECTION, SOLUTION 0.5 MG/.1ML
1 INJECTION, SOLUTION SUBCUTANEOUS ONCE
Refills: 0 | Status: DISCONTINUED | OUTPATIENT
Start: 2024-12-11 | End: 2024-12-12

## 2024-12-11 RX ORDER — SODIUM BICARBONATE 84 MG/ML
650 INJECTION, SOLUTION INTRAVENOUS THREE TIMES A DAY
Refills: 0 | Status: DISCONTINUED | OUTPATIENT
Start: 2024-12-11 | End: 2024-12-12

## 2024-12-11 RX ORDER — ACETAMINOPHEN 500MG 500 MG/1
650 TABLET, COATED ORAL EVERY 6 HOURS
Refills: 0 | Status: DISCONTINUED | OUTPATIENT
Start: 2024-12-11 | End: 2024-12-12

## 2024-12-11 RX ORDER — METOPROLOL TARTRATE 100 MG/1
1 TABLET, FILM COATED ORAL
Refills: 0 | DISCHARGE

## 2024-12-11 RX ORDER — HEPARIN SODIUM,PORCINE 1000/ML
5000 VIAL (ML) INJECTION EVERY 8 HOURS
Refills: 0 | Status: DISCONTINUED | OUTPATIENT
Start: 2024-12-11 | End: 2024-12-12

## 2024-12-11 RX ORDER — INFLUENZA VIRUS VACCINE 15; 15; 15; 15 UG/.5ML; UG/.5ML; UG/.5ML; UG/.5ML
0.5 SUSPENSION INTRAMUSCULAR ONCE
Refills: 0 | Status: DISCONTINUED | OUTPATIENT
Start: 2024-12-11 | End: 2024-12-12

## 2024-12-11 RX ADMIN — Medication 1: at 17:01

## 2024-12-11 RX ADMIN — METOPROLOL TARTRATE 50 MILLIGRAM(S): 100 TABLET, FILM COATED ORAL at 05:30

## 2024-12-11 RX ADMIN — SODIUM BICARBONATE 650 MILLIGRAM(S): 84 INJECTION, SOLUTION INTRAVENOUS at 21:50

## 2024-12-11 RX ADMIN — Medication 5000 UNIT(S): at 06:37

## 2024-12-11 RX ADMIN — FUROSEMIDE 40 MILLIGRAM(S): 40 TABLET ORAL at 00:09

## 2024-12-11 RX ADMIN — Medication 3: at 08:19

## 2024-12-11 RX ADMIN — Medication 5000 UNIT(S): at 21:50

## 2024-12-11 RX ADMIN — AMLODIPINE BESYLATE 10 MILLIGRAM(S): 10 TABLET ORAL at 05:30

## 2024-12-11 RX ADMIN — Medication 5000 UNIT(S): at 14:59

## 2024-12-11 RX ADMIN — INSULIN GLARGINE 25 UNIT(S): 100 INJECTION, SOLUTION SUBCUTANEOUS at 08:27

## 2024-12-11 NOTE — PROGRESS NOTE ADULT - ASSESSMENT
64 hx of htn, id diabetes with a cc of shortness of breath for 2 weeks. Patient states 2 weeks prior patient ate vegetables from his wife and had a onset of shortness of breath, n/v/f/c. Patient also endorsing decreased appetite and diarhhea. Patient denying chest pain, headaches, neuro changes, stool changes or urinary changes. Patient states he felt this prior when he went to [facility] 1 yr prior and states he was given a medicine to help him pee and made him feel better  pt seen dr narvaez for ckd last visit in 2017 has not been following up since. last scr in office 1.9 GFR 44    acute on ckd stage 3 vs progression of ckd  per patient now follows up with Dr. Orlando [phone], no answer when called  now scr 4.07 on admission slightly elevated   ? JORGITO vs progression  renal us without hydro  check ua urine p/c urine cr, urine urea  hold ARB/HCTZ  avoid nephrotoxic agent    hyponatremia  sec to hyperglycemia  optimize dm control  monitor    hyperkalemia  sec to renal failure and hyperglycemia  better  hold arb  optimize dm control  low k diet  monitor    sob  not clinically overloaded  chest xray clear  [id number] vix 1 12/11  symptom better today  f/u cardio    acidosis  nonAG likely sec to renal failure  oral bicarb 650 tid  monitor    ckd-mbd  check pth  monitor phos and calcium daily

## 2024-12-11 NOTE — PROGRESS NOTE ADULT - SUBJECTIVE AND OBJECTIVE BOX
Patient is a 64y old  Male who presents with a chief complaint of margarita (11 Dec 2024 14:28)    Date of servie : 12-11-24 @ 15:42  INTERVAL HPI/OVERNIGHT EVENTS:  T(C): 36.6 (12-11-24 @ 11:30), Max: 37.1 (12-11-24 @ 05:20)  HR: 73 (12-11-24 @ 11:30) (73 - 108)  BP: 147/72 (12-11-24 @ 11:30) (138/56 - 175/82)  RR: 18 (12-11-24 @ 11:30) (17 - 18)  SpO2: 96% (12-11-24 @ 11:30) (96% - 100%)  Wt(kg): --  I&O's Summary    11 Dec 2024 07:01  -  11 Dec 2024 15:42  --------------------------------------------------------  IN: 575 mL / OUT: 200 mL / NET: 375 mL        LABS:                        8.5    7.28  )-----------( 390      ( 11 Dec 2024 05:20 )             28.1     12-11    138  |  105  |  52[H]  ----------------------------<  294[H]  5.0   |  16[L]  |  4.28[H]    Ca    9.4      11 Dec 2024 05:20  Phos  3.9     12-11  Mg     1.70     12-11    TPro  8.2  /  Alb  3.5  /  TBili  0.3  /  DBili  x   /  AST  20  /  ALT  15  /  AlkPhos  152[H]  12-10    PT/INR - ( 10 Dec 2024 17:26 )   PT: 11.0 sec;   INR: 0.92 ratio         PTT - ( 10 Dec 2024 17:26 )  PTT:28.7 sec  Urinalysis Basic - ( 11 Dec 2024 05:20 )    Color: x / Appearance: x / SG: x / pH: x  Gluc: 294 mg/dL / Ketone: x  / Bili: x / Urobili: x   Blood: x / Protein: x / Nitrite: x   Leuk Esterase: x / RBC: x / WBC x   Sq Epi: x / Non Sq Epi: x / Bacteria: x      CAPILLARY BLOOD GLUCOSE      POCT Blood Glucose.: 138 mg/dL (11 Dec 2024 11:34)  POCT Blood Glucose.: 280 mg/dL (11 Dec 2024 07:32)  POCT Blood Glucose.: 264 mg/dL (11 Dec 2024 00:55)        Urinalysis Basic - ( 11 Dec 2024 05:20 )    Color: x / Appearance: x / SG: x / pH: x  Gluc: 294 mg/dL / Ketone: x  / Bili: x / Urobili: x   Blood: x / Protein: x / Nitrite: x   Leuk Esterase: x / RBC: x / WBC x   Sq Epi: x / Non Sq Epi: x / Bacteria: x        MEDICATIONS  (STANDING):  amLODIPine   Tablet 10 milliGRAM(s) Oral daily  dextrose 5%. 1000 milliLiter(s) (50 mL/Hr) IV Continuous <Continuous>  dextrose 50% Injectable 25 Gram(s) IV Push once  glucagon  Injectable 1 milliGRAM(s) IntraMuscular once  heparin   Injectable 5000 Unit(s) SubCutaneous every 8 hours  influenza  Vaccine (HIGH DOSE) 0.5 milliLiter(s) IntraMuscular once  insulin glargine Injectable (LANTUS) 25 Unit(s) SubCutaneous every morning  insulin lispro (ADMELOG) corrective regimen sliding scale   SubCutaneous three times a day before meals  insulin lispro (ADMELOG) corrective regimen sliding scale   SubCutaneous at bedtime  metoprolol succinate ER 50 milliGRAM(s) Oral daily  sodium bicarbonate 650 milliGRAM(s) Oral three times a day    MEDICATIONS  (PRN):  acetaminophen     Tablet .. 650 milliGRAM(s) Oral every 6 hours PRN Temp greater or equal to 38C (100.4F), Mild Pain (1 - 3)  dextrose Oral Gel 15 Gram(s) Oral once PRN Blood Glucose LESS THAN 70 milliGRAM(s)/deciliter  melatonin 3 milliGRAM(s) Oral at bedtime PRN Insomnia          PHYSICAL EXAM:  GENERAL: NAD, well-groomed, well-developed  HEAD:  Atraumatic, Normocephalic  CHEST/LUNG: Clear to percussion bilaterally; No rales, rhonchi, wheezing, or rubs  HEART: Regular rate and rhythm; No murmurs, rubs, or gallops  ABDOMEN: Soft, Nontender, Nondistended; Bowel sounds present  EXTREMITIES:  2+ Peripheral Pulses, No clubbing, cyanosis, or edema  LYMPH: No lymphadenopathy noted  SKIN: No rashes or lesions    Care Discussed with Consultants/Other Providers [ ] YES  [ ] NO

## 2024-12-11 NOTE — PROGRESS NOTE ADULT - ASSESSMENT
JORGITO on CKD  - monitor cr  - renal fu   - check urine studies    SOB  - sec to fluid overload   - check TTE  - cw diuretics  - Cards fu     DM  - monitor FS  - Basal, bolus     HTN  - cw home meds  - DASH diet    DVT prophylaxis    dw pt and PA

## 2024-12-11 NOTE — CONSULT NOTE ADULT - NS ATTEND AMEND GEN_ALL_CORE FT
Patient seen and examined. Agree with plan as detailed in PA/NP Note.     Previous mildy low EF, with non obstructive CAD, Suspect hypertensive heart disease  C/w BB and CCB for now  Check TTE  Euvolemic on exam      Preet Stone MD  Pager: [phone]
check work up as above

## 2024-12-11 NOTE — PATIENT PROFILE ADULT - FALL HARM RISK - HARM RISK INTERVENTIONS

## 2024-12-11 NOTE — CONSULT NOTE ADULT - SUBJECTIVE AND OBJECTIVE BOX
HISTORY OF PRESENT ILLNESS: HPI:  Pt is a 63 y/o M w/ pmhx of HTN, IDDM, and CKD, presenting with complaint of SOB x 2 weeks.   Pt states symptoms initially started after he ate fried vegetables 2 weeks ago. He states he had 1 episode of nbnb emesis at that time. He states he had intermittent fevers at night and overall did not feel well since. He states he had a few episode of nonbloody diarrhea as well which has since resolved. He states he now gets SOB after walking a few steps. He denies any leg swelling, cough, or orthopnea. He reports overall poor PO intake the past week and has been skipping his insulin and home medications since not feeling well.     In ED, vitals T97.9 , HR 90, /75, RR 18, O2 sat 100% on RA  Labs significant for: Hb 9.3, BUN/Cr 48/4.17, glucose 198   EKG personally reviewed: sinus rhythm w/ PACs, no acute ischemic changes  CXR: clear lungs  Imaging: U/S kidneys: normal renal u/s  ED management: lasix 40 mg PO x1, IV lasix 40 mg x1, lokelma 10 mg x1 (10 Dec 2024 23:40)    SOB resolved after Lasix in ED.  Denies chest pain, palps, LOC.  Poor historian.  Denies prior Neprhology or Cardiology OP visits.  Per chart review, underwent LHC in 2018 for abnormal NST revealing non obs CAD.    PAST MEDICAL & SURGICAL HISTORY:  Type II or unspecified type diabetes mellitus without mention of complication, not stated as uncontr      Unspecified essential hypertension      CAD (coronary artery disease)      Hypoglycemia      HLD (hyperlipidemia)      S/P cardiac catheterization  2010 (stent placement?)      MEDICATIONS  (STANDING):  amLODIPine   Tablet 10 milliGRAM(s) Oral daily  dextrose 5%. 1000 milliLiter(s) (50 mL/Hr) IV Continuous <Continuous>  dextrose 50% Injectable 25 Gram(s) IV Push once  glucagon  Injectable 1 milliGRAM(s) IntraMuscular once  heparin   Injectable 5000 Unit(s) SubCutaneous every 8 hours  influenza  Vaccine (HIGH DOSE) 0.5 milliLiter(s) IntraMuscular once  insulin glargine Injectable (LANTUS) 25 Unit(s) SubCutaneous every morning  insulin lispro (ADMELOG) corrective regimen sliding scale   SubCutaneous three times a day before meals  insulin lispro (ADMELOG) corrective regimen sliding scale   SubCutaneous at bedtime  metoprolol succinate ER 50 milliGRAM(s) Oral daily      Allergies  penicillins (Swelling)  ampicillin (Swelling)      FAMILY HISTORY:  No pertinent family history in first degree relatives  Noncontributory for premature coronary disease or sudden cardiac death    SOCIAL HISTORY:    [x ] Non-smoker  [ ] Smoker  [ ] Alcohol    FLU VACCINE THIS YEAR STARTS IN AUGUST:  [ ] Yes    [ ] No    IF OVER 65 HAVE YOU EVER HAD A PNA VACCINE:  [ ] Yes    [ ] No       [ ] N/A      REVIEW OF SYSTEMS:  [ ]chest pain  [x  ]shortness of breath  [  ]palpitations  [  ]syncope  [ ]near syncope [ ]upper extremity weakness   [ ] lower extremity weakness  [  ]diplopia  [  ]altered mental status   [  ]fevers  [ ]chills [ ]nausea  [ ]vomiting  [  ]dysphagia    [ ]abdominal pain  [ ]melena  [ ]BRBPR    [  ]epistaxis  [  ]rash    [ ]lower extremity edema        [x ] All others negative	  [ ] Unable to obtain      LABS:	 	    CARDIAC MARKERS:  Troponin T, High Sensitivity Result: 49, 49             8.5    7.28  )-----------( 390      ( 11 Dec 2024 05:20 )             28.1    138  |  105  |  52[H]  ----------------------------<  294[H]  5.0   |  16[L]  |  4.28[H]    Ca    9.4      11 Dec 2024 05:20  Phos  3.9     12-11  Mg     1.70     12-11    TPro  8.2  /  Alb  3.5  /  TBili  0.3  /  DBili  x   /  AST  20  /  ALT  15  /  AlkPhos  152[H]  12-10    Creatinine Trend: 4.28<--, 4.17<--, 4.07<--    Coags:  PT/INR - ( 10 Dec 2024 17:26 )   PT: 11.0 sec;   INR: 0.92 ratio      PTT - ( 10 Dec 2024 17:26 )  PTT:28.7 sec    PHYSICAL EXAM:  T(C): 37.1 (12-11-24 @ 05:20), Max: 37.1 (12-11-24 @ 05:20)  HR: 81 (12-11-24 @ 05:20) (81 - 108)  BP: 158/78 (12-11-24 @ 05:20) (138/56 - 175/82)  RR: 18 (12-11-24 @ 05:20) (17 - 18)  SpO2: 97% (12-11-24 @ 05:20) (97% - 100%)  Wt(kg): --   BMI (kg/m2): 25.1 (12-10-24 @ 14:23)  I&O's Summary    11 Dec 2024 07:01  -  11 Dec 2024 13:10  --------------------------------------------------------  IN: 260 mL / OUT: 0 mL / NET: 260 mL      Gen: Appears well in NAD  HEENT:  (-)icterus (-)pallor  CV: N S1 S2 1/6 DAVID (+)2 Pulses B/l  Resp:  Clear to ausculatation B/L, normal effort  GI: (+) BS Soft, NT, ND  Lymph:  (-)Edema, (-)obvious lymphadenopathy  Skin: Warm to touch, Normal turgor  Psych: Appropriate mood and affect    EKG- NSR, PACS    < from: Xray Chest 2 Views PA/Lat (12.10.24 @ 17:57) >  IMPRESSION:  No focal consolidations.  Bibasilar hazy opacities, which may represent mild pulmonary vascular   congestion.    < end of copied text >    < from: Cardiac Cath Lab - Adult (02.16.18 @ 16:02) >  DIAGNOSTIC IMPRESSIONS: Mild non-obstructive CAD  Elevated LVEDp 40mmHg  DIAGNOSTIC RECOMMENDATIONS: Medical therapy and optimization of volume status  INTERVENTIONAL IMPRESSIONS: Mild non-obstructive CAD  Elevated LVEDp 40mmHg  INTERVENTIONAL RECOMMENDATIONS: Medical therapy and optimization of volume  status    < end of copied text >      ASSESSMENT/PLAN: 	Pt is a 63 y/o M w/ pmhx of HTN, IDDM, CKD, with hx LVEF 47% and no obs CAD by cath in 2018, presenting with complaint of SOB x 2 weeks.  Found with elevated BNP, JORGITO on CKD, and mild pulm vasc congestion on CXR    #SOB  --s/p Lasix with improvement  --check TTE  --suspect volume overload due to JORGITO on CKD and HTN  --?compliant with meds/diet at home  --monitor BP on Norvasc and Toprol, if remains elevated, given PMH as above, would first change Toprol to Coreg.   --further reccs pending above    Laura LEONARD  [phone]                  CARDIOLOGY  ********************    HISTORY OF PRESENT ILLNESS: HPI:  Pt is a 63 y/o M w/ pmhx of HTN, IDDM, and CKD, presenting with complaint of SOB x 2 weeks.   Pt states symptoms initially started after he ate fried vegetables 2 weeks ago. He states he had 1 episode of nbnb emesis at that time. He states he had intermittent fevers at night and overall did not feel well since. He states he had a few episode of nonbloody diarrhea as well which has since resolved. He states he now gets SOB after walking a few steps. He denies any leg swelling, cough, or orthopnea. He reports overall poor PO intake the past week and has been skipping his insulin and home medications since not feeling well.     In ED, vitals T97.9 , HR 90, /75, RR 18, O2 sat 100% on RA  Labs significant for: Hb 9.3, BUN/Cr 48/4.17, glucose 198   EKG personally reviewed: sinus rhythm w/ PACs, no acute ischemic changes  CXR: clear lungs  Imaging: U/S kidneys: normal renal u/s  ED management: lasix 40 mg PO x1, IV lasix 40 mg x1, lokelma 10 mg x1 (10 Dec 2024 23:40)    SOB resolved after Lasix in ED.  Denies chest pain, palps, LOC.  Poor historian.  Denies prior Neprhology or Cardiology OP visits.  Per chart review, underwent LHC in 2018 for abnormal NST revealing non obs CAD.    PAST MEDICAL & SURGICAL HISTORY:  Type II or unspecified type diabetes mellitus without mention of complication, not stated as uncontr      Unspecified essential hypertension      CAD (coronary artery disease)      Hypoglycemia      HLD (hyperlipidemia)      S/P cardiac catheterization  2010 (stent placement?)      MEDICATIONS  (STANDING):  amLODIPine   Tablet 10 milliGRAM(s) Oral daily  dextrose 5%. 1000 milliLiter(s) (50 mL/Hr) IV Continuous <Continuous>  dextrose 50% Injectable 25 Gram(s) IV Push once  glucagon  Injectable 1 milliGRAM(s) IntraMuscular once  heparin   Injectable 5000 Unit(s) SubCutaneous every 8 hours  influenza  Vaccine (HIGH DOSE) 0.5 milliLiter(s) IntraMuscular once  insulin glargine Injectable (LANTUS) 25 Unit(s) SubCutaneous every morning  insulin lispro (ADMELOG) corrective regimen sliding scale   SubCutaneous three times a day before meals  insulin lispro (ADMELOG) corrective regimen sliding scale   SubCutaneous at bedtime  metoprolol succinate ER 50 milliGRAM(s) Oral daily      Allergies  penicillins (Swelling)  ampicillin (Swelling)      FAMILY HISTORY:  No pertinent family history in first degree relatives  Noncontributory for premature coronary disease or sudden cardiac death    SOCIAL HISTORY:    [x ] Non-smoker  [ ] Smoker  [ ] Alcohol    FLU VACCINE THIS YEAR STARTS IN AUGUST:  [ ] Yes    [ ] No    IF OVER 65 HAVE YOU EVER HAD A PNA VACCINE:  [ ] Yes    [ ] No       [ ] N/A      REVIEW OF SYSTEMS:  [ ]chest pain  [x  ]shortness of breath  [  ]palpitations  [  ]syncope  [ ]near syncope [ ]upper extremity weakness   [ ] lower extremity weakness  [  ]diplopia  [  ]altered mental status   [  ]fevers  [ ]chills [ ]nausea  [ ]vomiting  [  ]dysphagia    [ ]abdominal pain  [ ]melena  [ ]BRBPR    [  ]epistaxis  [  ]rash    [ ]lower extremity edema        [x ] All others negative	  [ ] Unable to obtain      LABS:	 	    CARDIAC MARKERS:  Troponin T, High Sensitivity Result: 49, 49             8.5    7.28  )-----------( 390      ( 11 Dec 2024 05:20 )             28.1    138  |  105  |  52[H]  ----------------------------<  294[H]  5.0   |  16[L]  |  4.28[H]    Ca    9.4      11 Dec 2024 05:20  Phos  3.9     12-11  Mg     1.70     12-11    TPro  8.2  /  Alb  3.5  /  TBili  0.3  /  DBili  x   /  AST  20  /  ALT  15  /  AlkPhos  152[H]  12-10    Creatinine Trend: 4.28<--, 4.17<--, 4.07<--    Coags:  PT/INR - ( 10 Dec 2024 17:26 )   PT: 11.0 sec;   INR: 0.92 ratio      PTT - ( 10 Dec 2024 17:26 )  PTT:28.7 sec    PHYSICAL EXAM:  T(C): 37.1 (12-11-24 @ 05:20), Max: 37.1 (12-11-24 @ 05:20)  HR: 81 (12-11-24 @ 05:20) (81 - 108)  BP: 158/78 (12-11-24 @ 05:20) (138/56 - 175/82)  RR: 18 (12-11-24 @ 05:20) (17 - 18)  SpO2: 97% (12-11-24 @ 05:20) (97% - 100%)  Wt(kg): --   BMI (kg/m2): 25.1 (12-10-24 @ 14:23)  I&O's Summary    11 Dec 2024 07:01  -  11 Dec 2024 13:10  --------------------------------------------------------  IN: 260 mL / OUT: 0 mL / NET: 260 mL      Gen: Appears well in NAD  HEENT:  (-)icterus (-)pallor  CV: N S1 S2 1/6 DAVID (+)2 Pulses B/l  Resp:  Clear to ausculatation B/L, normal effort  GI: (+) BS Soft, NT, ND  Lymph:  (-)Edema, (-)obvious lymphadenopathy  Skin: Warm to touch, Normal turgor  Psych: Appropriate mood and affect    EKG- NSR, PACS    < from: Xray Chest 2 Views PA/Lat (12.10.24 @ 17:57) >  IMPRESSION:  No focal consolidations.  Bibasilar hazy opacities, which may represent mild pulmonary vascular   congestion.    < end of copied text >    < from: Cardiac Cath Lab - Adult (02.16.18 @ 16:02) >  DIAGNOSTIC IMPRESSIONS: Mild non-obstructive CAD  Elevated LVEDp 40mmHg  DIAGNOSTIC RECOMMENDATIONS: Medical therapy and optimization of volume status  INTERVENTIONAL IMPRESSIONS: Mild non-obstructive CAD  Elevated LVEDp 40mmHg  INTERVENTIONAL RECOMMENDATIONS: Medical therapy and optimization of volume  status    < end of copied text >      ASSESSMENT/PLAN: 	Pt is a 63 y/o M w/ pmhx of HTN, IDDM, CKD, with hx LVEF 47% and no obs CAD by cath in 2018, presenting with complaint of SOB x 2 weeks.  Found with elevated BNP, JORGITO on CKD, and mild pulm vasc congestion on CXR    #SOB  --s/p Lasix with improvement  --check TTE  --suspect volume overload due to JORGITO on CKD and HTN  --?compliant with meds/diet at home  --monitor BP on Norvasc and Toprol, if remains elevated, given PMH as above, would first change Toprol to Coreg.   --further reccs pending above    Laura LEONARD  [phone]

## 2024-12-11 NOTE — PROGRESS NOTE ADULT - SUBJECTIVE AND OBJECTIVE BOX
[facility]   MD MELANIE VÁSQUEZ MD ANGELA WONG, PA    TEL:  OFFICE: [phone]  From 5pm-7am Answering Service [phone]    -- RENAL FOLLOW UP NOTE ---Date of Service 12-11-24 @ 14:28    Patient is a 64y old  Male who presents with a chief complaint of margarita (11 Dec 2024 13:10)      Patient seen and examined at bedside. No chest pain/sob    VITALS:  T(F): 97.8 (12-11-24 @ 11:30), Max: 98.7 (12-11-24 @ 05:20)  HR: 73 (12-11-24 @ 11:30)  BP: 147/72 (12-11-24 @ 11:30)  RR: 18 (12-11-24 @ 11:30)  SpO2: 96% (12-11-24 @ 11:30)  Wt(kg): --    12-11 @ 07:01  -  12-11 @ 14:28  --------------------------------------------------------  IN: 575 mL / OUT: 200 mL / NET: 375 mL          PHYSICAL EXAM:  General: NAD  Neck: No JVD  Respiratory: CTAB, no wheezes, rales or rhonchi  Cardiovascular: S1, S2, RRR  Gastrointestinal: BS+, soft, NT/ND  Extremities: No peripheral edema    Hospital Medications:   MEDICATIONS  (STANDING):  amLODIPine   Tablet 10 milliGRAM(s) Oral daily  dextrose 5%. 1000 milliLiter(s) (50 mL/Hr) IV Continuous <Continuous>  dextrose 50% Injectable 25 Gram(s) IV Push once  glucagon  Injectable 1 milliGRAM(s) IntraMuscular once  heparin   Injectable 5000 Unit(s) SubCutaneous every 8 hours  influenza  Vaccine (HIGH DOSE) 0.5 milliLiter(s) IntraMuscular once  insulin glargine Injectable (LANTUS) 25 Unit(s) SubCutaneous every morning  insulin lispro (ADMELOG) corrective regimen sliding scale   SubCutaneous three times a day before meals  insulin lispro (ADMELOG) corrective regimen sliding scale   SubCutaneous at bedtime  metoprolol succinate ER 50 milliGRAM(s) Oral daily      LABS:  12-11    138  |  105  |  52[H]  ----------------------------<  294[H]  5.0   |  16[L]  |  4.28[H]    Ca    9.4      11 Dec 2024 05:20  Phos  3.9     12-11  Mg     1.70     12-11    TPro  8.2  /  Alb  3.5  /  TBili  0.3  /  DBili      /  AST  20  /  ALT  15  /  AlkPhos  152[H]  12-10    Creatinine Trend: 4.28 <--, 4.17 <--, 4.07 <--    Phosphorus: 3.9 mg/dL (12-11 @ 05:20)  Phosphorus: 3.4 mg/dL (12-10 @ 17:26)  Albumin: 3.5 g/dL (12-10 @ 17:26)                              8.5    7.28  )-----------( 390      ( 11 Dec 2024 05:20 )             28.1     Urine Studies:  Urinalysis - [12-11-24 @ 05:20]      Color  / Appearance  / SG  / pH       Gluc 294 / Ketone   / Bili  / Urobili        Blood  / Protein  / Leuk Est  / Nitrite       RBC  / WBC  / Hyaline  / Gran  / Sq Epi  / Non Sq Epi  / Bacteria             RADIOLOGY & ADDITIONAL STUDIES:

## 2024-12-11 NOTE — PATIENT PROFILE ADULT - FUNCTIONAL ASSESSMENT - BASIC MOBILITY 6.
3-calculated by average/Not able to assess (calculate score using [facility] averaging method)

## 2024-12-12 ENCOUNTER — TRANSCRIPTION ENCOUNTER (OUTPATIENT)
Age: 64
End: 2024-12-12

## 2024-12-12 VITALS — WEIGHT: 173.5 LBS

## 2024-12-12 LAB
24R-OH-CALCIDIOL SERPL-MCNC: 18.6 NG/ML — LOW (ref 30–80)
ADD ON TEST-SPECIMEN IN LAB: SIGNIFICANT CHANGE UP
ADD ON TEST-SPECIMEN IN LAB: SIGNIFICANT CHANGE UP
ANION GAP SERPL CALC-SCNC: 13 MMOL/L — SIGNIFICANT CHANGE UP (ref 7–14)
APPEARANCE UR: CLEAR — SIGNIFICANT CHANGE UP
BACTERIA # UR AUTO: NEGATIVE /HPF — SIGNIFICANT CHANGE UP
BASOPHILS # BLD AUTO: 0.05 K/UL — SIGNIFICANT CHANGE UP (ref 0–0.2)
BASOPHILS NFR BLD AUTO: 0.8 % — SIGNIFICANT CHANGE UP (ref 0–2)
BILIRUB UR-MCNC: NEGATIVE — SIGNIFICANT CHANGE UP
BUN SERPL-MCNC: 58 MG/DL — HIGH (ref 7–23)
CALCIUM SERPL-MCNC: 8.5 MG/DL — SIGNIFICANT CHANGE UP (ref 8.4–10.5)
CALCIUM SERPL-MCNC: 8.7 MG/DL — SIGNIFICANT CHANGE UP (ref 8.4–10.5)
CAST: 1 /LPF — SIGNIFICANT CHANGE UP (ref 0–4)
CHLORIDE SERPL-SCNC: 103 MMOL/L — SIGNIFICANT CHANGE UP (ref 98–107)
CHOLEST SERPL-MCNC: 138 MG/DL — SIGNIFICANT CHANGE UP
CO2 SERPL-SCNC: 17 MMOL/L — LOW (ref 22–31)
COLOR SPEC: YELLOW — SIGNIFICANT CHANGE UP
CORTIS AM PEAK SERPL-MCNC: 10 UG/DL — SIGNIFICANT CHANGE UP (ref 6–18.4)
CREAT ?TM UR-MCNC: 122 MG/DL — SIGNIFICANT CHANGE UP
CREAT SERPL-MCNC: 4.39 MG/DL — HIGH (ref 0.5–1.3)
DIFF PNL FLD: NEGATIVE — SIGNIFICANT CHANGE UP
EGFR: 14 ML/MIN/1.73M2 — LOW
EOSINOPHIL # BLD AUTO: 0.51 K/UL — HIGH (ref 0–0.5)
EOSINOPHIL NFR BLD AUTO: 8.3 % — HIGH (ref 0–6)
FERRITIN SERPL-MCNC: 316 NG/ML — SIGNIFICANT CHANGE UP (ref 30–400)
FOLATE SERPL-MCNC: 7.1 NG/ML — SIGNIFICANT CHANGE UP (ref 3.1–17.5)
GLUCOSE BLDC GLUCOMTR-MCNC: 111 MG/DL — HIGH (ref 70–99)
GLUCOSE BLDC GLUCOMTR-MCNC: 172 MG/DL — HIGH (ref 70–99)
GLUCOSE BLDC GLUCOMTR-MCNC: 278 MG/DL — HIGH (ref 70–99)
GLUCOSE SERPL-MCNC: 203 MG/DL — HIGH (ref 70–99)
GLUCOSE UR QL: NEGATIVE MG/DL — SIGNIFICANT CHANGE UP
HCT VFR BLD CALC: 27 % — LOW (ref 39–50)
HDLC SERPL-MCNC: 23 MG/DL — LOW
HGB BLD-MCNC: 8.4 G/DL — LOW (ref 13–17)
IANC: 3.13 K/UL — SIGNIFICANT CHANGE UP (ref 1.8–7.4)
IMM GRANULOCYTES NFR BLD AUTO: 1.3 % — HIGH (ref 0–0.9)
IRON SATN MFR SERPL: 23 % — SIGNIFICANT CHANGE UP (ref 14–50)
IRON SATN MFR SERPL: 38 UG/DL — LOW (ref 45–165)
KETONES UR-MCNC: NEGATIVE MG/DL — SIGNIFICANT CHANGE UP
LEUKOCYTE ESTERASE UR-ACNC: NEGATIVE — SIGNIFICANT CHANGE UP
LIPID PNL WITH DIRECT LDL SERPL: 82 MG/DL — SIGNIFICANT CHANGE UP
LYMPHOCYTES # BLD AUTO: 1.38 K/UL — SIGNIFICANT CHANGE UP (ref 1–3.3)
LYMPHOCYTES # BLD AUTO: 22.5 % — SIGNIFICANT CHANGE UP (ref 13–44)
MAGNESIUM SERPL-MCNC: 1.7 MG/DL — SIGNIFICANT CHANGE UP (ref 1.6–2.6)
MCHC RBC-ENTMCNC: 21.7 PG — LOW (ref 27–34)
MCHC RBC-ENTMCNC: 31.1 G/DL — LOW (ref 32–36)
MCV RBC AUTO: 69.8 FL — LOW (ref 80–100)
MONOCYTES # BLD AUTO: 0.99 K/UL — HIGH (ref 0–0.9)
MONOCYTES NFR BLD AUTO: 16.1 % — HIGH (ref 2–14)
NEUTROPHILS # BLD AUTO: 3.13 K/UL — SIGNIFICANT CHANGE UP (ref 1.8–7.4)
NEUTROPHILS NFR BLD AUTO: 51 % — SIGNIFICANT CHANGE UP (ref 43–77)
NITRITE UR-MCNC: NEGATIVE — SIGNIFICANT CHANGE UP
NON HDL CHOLESTEROL: 115 MG/DL — SIGNIFICANT CHANGE UP
NRBC # BLD: 0 /100 WBCS — SIGNIFICANT CHANGE UP (ref 0–0)
NRBC # FLD: 0 K/UL — SIGNIFICANT CHANGE UP (ref 0–0)
PH UR: 6.5 — SIGNIFICANT CHANGE UP (ref 5–8)
PHOSPHATE SERPL-MCNC: 4.9 MG/DL — HIGH (ref 2.5–4.5)
PLATELET # BLD AUTO: 415 K/UL — HIGH (ref 150–400)
POTASSIUM SERPL-MCNC: 4.3 MMOL/L — SIGNIFICANT CHANGE UP (ref 3.5–5.3)
POTASSIUM SERPL-SCNC: 4.3 MMOL/L — SIGNIFICANT CHANGE UP (ref 3.5–5.3)
PROT ?TM UR-MCNC: 161 MG/DL — SIGNIFICANT CHANGE UP
PROT UR-MCNC: 300 MG/DL
PROT/CREAT UR-RTO: 1.3 RATIO — HIGH (ref 0–0.2)
PTH-INTACT FLD-MCNC: 257 PG/ML — HIGH (ref 15–65)
RBC # BLD: 3.87 M/UL — LOW (ref 4.2–5.8)
RBC # FLD: 17.6 % — HIGH (ref 10.3–14.5)
RBC CASTS # UR COMP ASSIST: 0 /HPF — SIGNIFICANT CHANGE UP (ref 0–4)
SODIUM SERPL-SCNC: 133 MMOL/L — LOW (ref 135–145)
SP GR SPEC: 1.01 — SIGNIFICANT CHANGE UP (ref 1–1.03)
SQUAMOUS # UR AUTO: 0 /HPF — SIGNIFICANT CHANGE UP (ref 0–5)
TIBC SERPL-MCNC: 167 UG/DL — LOW (ref 220–430)
TRIGL SERPL-MCNC: 192 MG/DL — HIGH
TSH SERPL-MCNC: 1.75 UIU/ML — SIGNIFICANT CHANGE UP (ref 0.27–4.2)
UIBC SERPL-MCNC: 129 UG/DL — SIGNIFICANT CHANGE UP (ref 110–370)
UROBILINOGEN FLD QL: 0.2 MG/DL — SIGNIFICANT CHANGE UP (ref 0.2–1)
UUN UR-MCNC: 614 MG/DL — SIGNIFICANT CHANGE UP
VIT B12 SERPL-MCNC: 631 PG/ML — SIGNIFICANT CHANGE UP (ref 200–900)
VIT D25+D1,25 OH+D1,25 PNL SERPL-MCNC: 26.6 PG/ML — SIGNIFICANT CHANGE UP (ref 19.9–79.3)
WBC # BLD: 6.14 K/UL — SIGNIFICANT CHANGE UP (ref 3.8–10.5)
WBC # FLD AUTO: 6.14 K/UL — SIGNIFICANT CHANGE UP (ref 3.8–10.5)
WBC UR QL: 1 /HPF — SIGNIFICANT CHANGE UP (ref 0–5)

## 2024-12-12 PROCEDURE — 93010 ELECTROCARDIOGRAM REPORT: CPT

## 2024-12-12 RX ORDER — SODIUM BICARBONATE 84 MG/ML
1 INJECTION, SOLUTION INTRAVENOUS
Qty: 90 | Refills: 0
Start: 2024-12-12 | End: 2025-01-10

## 2024-12-12 RX ORDER — FUROSEMIDE 40 MG/1
1 TABLET ORAL
Qty: 60 | Refills: 0
Start: 2024-12-12 | End: 2025-01-10

## 2024-12-12 RX ORDER — NIFEDIPINE 10 MG
60 CAPSULE ORAL
Refills: 0 | Status: DISCONTINUED | OUTPATIENT
Start: 2024-12-12 | End: 2024-12-12

## 2024-12-12 RX ORDER — METOPROLOL TARTRATE 100 MG/1
75 TABLET, FILM COATED ORAL DAILY
Refills: 0 | Status: DISCONTINUED | OUTPATIENT
Start: 2024-12-12 | End: 2024-12-12

## 2024-12-12 RX ORDER — NIFEDIPINE 10 MG
1 CAPSULE ORAL
Qty: 30 | Refills: 0
Start: 2024-12-12 | End: 2025-01-10

## 2024-12-12 RX ORDER — CALCITRIOL 0.5 UG/1
1 CAPSULE, LIQUID FILLED ORAL
Qty: 30 | Refills: 0
Start: 2024-12-12 | End: 2025-01-10

## 2024-12-12 RX ORDER — CALCITRIOL 0.5 UG/1
0.25 CAPSULE, LIQUID FILLED ORAL DAILY
Refills: 0 | Status: DISCONTINUED | OUTPATIENT
Start: 2024-12-12 | End: 2024-12-12

## 2024-12-12 RX ORDER — INSULIN GLARGINE 100 [IU]/ML
30 INJECTION, SOLUTION SUBCUTANEOUS
Refills: 0 | DISCHARGE

## 2024-12-12 RX ORDER — METOPROLOL TARTRATE 100 MG/1
50 TABLET, FILM COATED ORAL
Refills: 0 | Status: DISCONTINUED | OUTPATIENT
Start: 2024-12-12 | End: 2024-12-12

## 2024-12-12 RX ORDER — INSULIN GLARGINE 100 [IU]/ML
25 INJECTION, SOLUTION SUBCUTANEOUS
Qty: 1 | Refills: 0
Start: 2024-12-12 | End: 2025-01-10

## 2024-12-12 RX ADMIN — Medication 100 GRAM(S): at 08:49

## 2024-12-12 RX ADMIN — AMLODIPINE BESYLATE 10 MILLIGRAM(S): 10 TABLET ORAL at 05:32

## 2024-12-12 RX ADMIN — Medication 5000 UNIT(S): at 05:32

## 2024-12-12 RX ADMIN — SODIUM BICARBONATE 650 MILLIGRAM(S): 84 INJECTION, SOLUTION INTRAVENOUS at 05:32

## 2024-12-12 RX ADMIN — METOPROLOL TARTRATE 50 MILLIGRAM(S): 100 TABLET, FILM COATED ORAL at 05:32

## 2024-12-12 RX ADMIN — SODIUM BICARBONATE 650 MILLIGRAM(S): 84 INJECTION, SOLUTION INTRAVENOUS at 13:03

## 2024-12-12 RX ADMIN — Medication 1: at 07:40

## 2024-12-12 RX ADMIN — Medication 2 UNIT(S): at 17:09

## 2024-12-12 RX ADMIN — Medication 400 MILLIGRAM(S): at 12:01

## 2024-12-12 RX ADMIN — Medication 400 MILLIGRAM(S): at 17:12

## 2024-12-12 RX ADMIN — INSULIN GLARGINE 25 UNIT(S): 100 INJECTION, SOLUTION SUBCUTANEOUS at 07:40

## 2024-12-12 RX ADMIN — Medication 5000 UNIT(S): at 13:03

## 2024-12-12 RX ADMIN — Medication 3: at 17:10

## 2024-12-12 RX ADMIN — Medication 2 UNIT(S): at 11:34

## 2024-12-12 NOTE — DIETITIAN INITIAL EVALUATION ADULT - PERTINENT LABORATORY DATA
12-12    133[L]  |  103  |  58[H]  ----------------------------<  203[H]  4.3   |  17[L]  |  4.39[H]    Ca    8.7      12 Dec 2024 04:15  Phos  4.9     12-12  Mg     1.70     12-12    TPro  8.2  /  Alb  3.5  /  TBili  0.3  /  DBili  x   /  AST  20  /  ALT  15  /  AlkPhos  152[H]  12-10  POCT Blood Glucose.: 111 mg/dL (12-12-24 @ 11:17)  A1C with Estimated Average Glucose Result: 7.5 % (12-11-24 @ 05:20)

## 2024-12-12 NOTE — DISCHARGE NOTE NURSING/CASE MANAGEMENT/SOCIAL WORK - FINANCIAL ASSISTANCE
[facility] provides services at a reduced cost to those who are determined to be eligible through [facility]’s financial assistance program. Information regarding [facility]’s financial assistance program can be found by going to [email or web address] or by calling [phone].

## 2024-12-12 NOTE — DISCHARGE NOTE PROVIDER - HOSPITAL COURSE
Patient seen and evaluated, no acute somatic complaints. Reviewed discharge medications with patient; All new medications requiring new prescriptions were sent to the pharmacy of patient's choice. Reviewed need for prescription for previous home medications and new prescriptions sent if requested. Medically cleared/stable for discharge as per Dr. Stone on 12/12/24 with close outpatient follow up within 1-2 weeks of discharge. Patient understands and agrees with plan of care. Pt is a 65 y/o M w/ pmhx of HTN, IDDM, CKD, with hx LVEF 47% and no obs CAD by cath in 2018, presenting with complaint of SOB x 2 weeks.  Found with elevated BNP, JORGITO on CKD, and mild pulm vasc congestion on CXR    #SOB  -- s/p Lasix with improvement  -- TTE with Normal LV systolic function, LVH, mild DD  -- suspect volume overload due to JORGITO on CKD and HTN  -- ?compliant with meds/diet at home  -- cont Toprol 50mg daily, would not increase given asymptomatic sinus bradycardia to 40s  -- change Norvasc to Procardia XL 60mg daily for HTN  - op pcp and cards fu     acute on ckd stage 3 vs progression of ckd  per wife pt is ckd stage 4  now scr 4.07 on admission slightly elevated   ? JORGITO vs progression  renal us without hydro  check ua urine p/c urine cr, urine urea  hold ARB/HCTZ  still have [place] will start lasix 40po bid  pt can be dc from renal stand point. pt advised to follow up with Dr Azul in 1 wk post discharge   avoid nephrotoxic agent    hyponatremia  sec to hyperglycemia  optimize dm control  monitor    hyperkalemia  sec to renal failure and hyperglycemia  better  hold arb  optimize dm control  low k diet  monitor    anemia  - checked iron panel  monitor  likely would need epo weekly. can follow up outpatient    htn  suboptimal  on norvasc 10  holding arb/hctz  monitor    sob  [id number] vix 1 12/11  start lasix 40 po bid  f/u cardio    acidosis  nonAG likely sec to renal failure  oral bicarb 650 tid  monitor    ckd-mbd  elevated pth start calcitriol 0.25 daily  monitor phos and calcium daily    Patient seen and evaluated, no acute somatic complaints. Reviewed discharge medications with patient; All new medications requiring new prescriptions were sent to the pharmacy of patient's choice. Reviewed need for prescription for previous home medications and new prescriptions sent if requested. Medically cleared/stable for discharge as per Dr. Stone on 12/12/24 with close outpatient follow up within 1-2 weeks of discharge. Patient understands and agrees with plan of care.

## 2024-12-12 NOTE — PROGRESS NOTE ADULT - SUBJECTIVE AND OBJECTIVE BOX
Date of service 12/12/24    no chest pain or SOB, ROS otherwise negative      acetaminophen     Tablet .. 650 milliGRAM(s) Oral every 6 hours PRN  atorvastatin 10 milliGRAM(s) Oral at bedtime  calcitriol   Capsule 0.25 MICROGram(s) Oral daily  dextrose 5%. 1000 milliLiter(s) IV Continuous <Continuous>  dextrose 50% Injectable 25 Gram(s) IV Push once  dextrose Oral Gel 15 Gram(s) Oral once PRN  glucagon  Injectable 1 milliGRAM(s) IntraMuscular once  heparin   Injectable 5000 Unit(s) SubCutaneous every 8 hours  influenza  Vaccine (HIGH DOSE) 0.5 milliLiter(s) IntraMuscular once  insulin glargine Injectable (LANTUS) 25 Unit(s) SubCutaneous every morning  insulin lispro (ADMELOG) corrective regimen sliding scale   SubCutaneous three times a day before meals  insulin lispro (ADMELOG) corrective regimen sliding scale   SubCutaneous at bedtime  insulin lispro Injectable (ADMELOG) 2 Unit(s) SubCutaneous three times a day before meals  magnesium oxide 400 milliGRAM(s) Oral every 4 hours  melatonin 3 milliGRAM(s) Oral at bedtime PRN  metoprolol succinate ER 50 milliGRAM(s) Oral <User Schedule>  NIFEdipine XL 60 milliGRAM(s) Oral <User Schedule>  sodium bicarbonate 650 milliGRAM(s) Oral three times a day                            8.4    6.14  )-----------( 415      ( 12 Dec 2024 04:15 )             27.0       12-12    133[L]  |  103  |  58[H]  ----------------------------<  203[H]  4.3   |  17[L]  |  4.39[H]    Ca    8.7      12 Dec 2024 04:15  Phos  4.9     12-12  Mg     1.70     12-12    TPro  8.2  /  Alb  3.5  /  TBili  0.3  /  DBili  x   /  AST  20  /  ALT  15  /  AlkPhos  152[H]  12-10      T(C): 36.8 (12-12-24 @ 11:11), Max: 36.9 (12-11-24 @ 17:00)  HR: 68 (12-12-24 @ 11:11) (68 - 76)  BP: 142/71 (12-12-24 @ 11:11) (142/71 - 151/69)  RR: 18 (12-12-24 @ 11:11) (18 - 18)  SpO2: 99% (12-12-24 @ 11:11) (96% - 99%)  Wt(kg): --    I&O's Summary    11 Dec 2024 07:01  -  12 Dec 2024 07:00  --------------------------------------------------------  IN: 975 mL / OUT: 200 mL / NET: 775 mL        General: Well nourished in no acute distress. Alert and Oriented * 3.   Head: Normocephalic and atraumatic.   Neck: No JVD. No bruits. Supple. Does not appear to be enlarged.   Cardiovascular: + S1,S2 ; RRR Soft systolic murmur at the left lower sternal border. No rubs noted.    Lungs: CTA b/l. No rhonchi, rales or wheezes.   Abdomen: + BS, soft. Non tender. Non distended. No rebound. No guarding.   Extremities: No clubbing/cyanosis/edema.   Neurologic: Moves all four extremities. Full range of motion.   Skin: Warm and moist. The patient's skin has normal elasticity and good skin turgor.   Psychiatric: Appropriate mood and affect.  Musculoskeletal: Normal range of motion, normal strength    DATA    tele- SB SR    EKG- NSR LAFB no acute changes    < from: Xray Chest 2 Views PA/Lat (12.10.24 @ 17:57) >  IMPRESSION:  No focal consolidations.  Bibasilar hazy opacities, which may represent mild pulmonary vascular   congestion.    < end of copied text >    < from: Cardiac Cath Lab - Adult (02.16.18 @ 16:02) >  DIAGNOSTIC IMPRESSIONS: Mild non-obstructive CAD  Elevated LVEDp 40mmHg  DIAGNOSTIC RECOMMENDATIONS: Medical therapy and optimization of volume status  INTERVENTIONAL IMPRESSIONS: Mild non-obstructive CAD  Elevated LVEDp 40mmHg  INTERVENTIONAL RECOMMENDATIONS: Medical therapy and optimization of volume  status    < end of copied text >    < from: TTE W or WO Ultrasound Enhancing Agent (12.11.24 @ 11:58) >     CONCLUSIONS:      1. The left ventricular cavity is normal in size. Left ventricular wall thickness is moderately increased. Left ventricular systolic function is normal with a calculated ejection fraction of 59 % by the Bauer's biplane method of disks. There are no regional wall motion abnormalities seen. There is increased LV mass and concentric hypertrophy. There is mild (grade 1) left ventricular diastolic dysfunction.   2. Normal right ventricular cavity size and normal right ventricular systolic function.   3. Structurally normal mitral valve with normal leaflet excursion. There is calcification of the mitral valve annulus. There is mild mitral regurgitation.    < end of copied text >        ASSESSMENT/PLAN: 	Pt is a 63 y/o M w/ pmhx of HTN, IDDM, CKD, with hx LVEF 47% and no obs CAD by cath in 2018, presenting with complaint of SOB x 2 weeks.  Found with elevated BNP, JORGITO on CKD, and mild pulm vasc congestion on CXR    #SOB  --s/p Lasix with improvement  --TTE with Normal LV systolic function, LVH, mild DD  --suspect volume overload due to JORGITO on CKD and HTN  --?compliant with meds/diet at home  --cont toprol 50mg daily, would not increase given asymptomatic sinus bradycardia to 40s  --change Norvasc to Procardia XL 60mg daily for HTN    will arrange for OP F/U in the office, [phone]    Laura LEONARD  [phone]                        Date of service 12/12/24    no chest pain or SOB, ROS otherwise negative      acetaminophen     Tablet .. 650 milliGRAM(s) Oral every 6 hours PRN  atorvastatin 10 milliGRAM(s) Oral at bedtime  calcitriol   Capsule 0.25 MICROGram(s) Oral daily  dextrose 5%. 1000 milliLiter(s) IV Continuous <Continuous>  dextrose 50% Injectable 25 Gram(s) IV Push once  dextrose Oral Gel 15 Gram(s) Oral once PRN  glucagon  Injectable 1 milliGRAM(s) IntraMuscular once  heparin   Injectable 5000 Unit(s) SubCutaneous every 8 hours  influenza  Vaccine (HIGH DOSE) 0.5 milliLiter(s) IntraMuscular once  insulin glargine Injectable (LANTUS) 25 Unit(s) SubCutaneous every morning  insulin lispro (ADMELOG) corrective regimen sliding scale   SubCutaneous three times a day before meals  insulin lispro (ADMELOG) corrective regimen sliding scale   SubCutaneous at bedtime  insulin lispro Injectable (ADMELOG) 2 Unit(s) SubCutaneous three times a day before meals  magnesium oxide 400 milliGRAM(s) Oral every 4 hours  melatonin 3 milliGRAM(s) Oral at bedtime PRN  metoprolol succinate ER 50 milliGRAM(s) Oral <User Schedule>  NIFEdipine XL 60 milliGRAM(s) Oral <User Schedule>  sodium bicarbonate 650 milliGRAM(s) Oral three times a day                            8.4    6.14  )-----------( 415      ( 12 Dec 2024 04:15 )             27.0       12-12    133[L]  |  103  |  58[H]  ----------------------------<  203[H]  4.3   |  17[L]  |  4.39[H]    Ca    8.7      12 Dec 2024 04:15  Phos  4.9     12-12  Mg     1.70     12-12    TPro  8.2  /  Alb  3.5  /  TBili  0.3  /  DBili  x   /  AST  20  /  ALT  15  /  AlkPhos  152[H]  12-10      T(C): 36.8 (12-12-24 @ 11:11), Max: 36.9 (12-11-24 @ 17:00)  HR: 68 (12-12-24 @ 11:11) (68 - 76)  BP: 142/71 (12-12-24 @ 11:11) (142/71 - 151/69)  RR: 18 (12-12-24 @ 11:11) (18 - 18)  SpO2: 99% (12-12-24 @ 11:11) (96% - 99%)  Wt(kg): --    I&O's Summary    11 Dec 2024 07:01  -  12 Dec 2024 07:00  --------------------------------------------------------  IN: 975 mL / OUT: 200 mL / NET: 775 mL        General: Well nourished in no acute distress. Alert and Oriented * 3.   Head: Normocephalic and atraumatic.   Neck: No JVD. No bruits. Supple. Does not appear to be enlarged.   Cardiovascular: + S1,S2 ; RRR Soft systolic murmur at the left lower sternal border. No rubs noted.    Lungs: CTA b/l. No rhonchi, rales or wheezes.   Abdomen: + BS, soft. Non tender. Non distended. No rebound. No guarding.   Extremities: No clubbing/cyanosis/edema.   Neurologic: Moves all four extremities. Full range of motion.   Skin: Warm and moist. The patient's skin has normal elasticity and good skin turgor.   Psychiatric: Appropriate mood and affect.  Musculoskeletal: Normal range of motion, normal strength    DATA    tele- SB SR    EKG- NSR LAFB no acute changes    < from: Xray Chest 2 Views PA/Lat (12.10.24 @ 17:57) >  IMPRESSION:  No focal consolidations.  Bibasilar hazy opacities, which may represent mild pulmonary vascular   congestion.    < end of copied text >    < from: Cardiac Cath Lab - Adult (02.16.18 @ 16:02) >  DIAGNOSTIC IMPRESSIONS: Mild non-obstructive CAD  Elevated LVEDp 40mmHg  DIAGNOSTIC RECOMMENDATIONS: Medical therapy and optimization of volume status  INTERVENTIONAL IMPRESSIONS: Mild non-obstructive CAD  Elevated LVEDp 40mmHg  INTERVENTIONAL RECOMMENDATIONS: Medical therapy and optimization of volume  status    < end of copied text >    < from: TTE W or WO Ultrasound Enhancing Agent (12.11.24 @ 11:58) >     CONCLUSIONS:      1. The left ventricular cavity is normal in size. Left ventricular wall thickness is moderately increased. Left ventricular systolic function is normal with a calculated ejection fraction of 59 % by the Bauer's biplane method of disks. There are no regional wall motion abnormalities seen. There is increased LV mass and concentric hypertrophy. There is mild (grade 1) left ventricular diastolic dysfunction.   2. Normal right ventricular cavity size and normal right ventricular systolic function.   3. Structurally normal mitral valve with normal leaflet excursion. There is calcification of the mitral valve annulus. There is mild mitral regurgitation.    < end of copied text >        ASSESSMENT/PLAN: 	Pt is a 63 y/o M w/ pmhx of HTN, IDDM, CKD, with hx LVEF 47% and no obs CAD by cath in 2018, presenting with complaint of SOB x 2 weeks.  Found with elevated BNP, JORGITO on CKD, and mild pulm vasc congestion on CXR    #SOB  -- s/p Lasix with improvement  -- TTE with Normal LV systolic function, LVH, mild DD  -- suspect volume overload due to JORGITO on CKD and HTN  -- ?compliant with meds/diet at home  -- cont Toprol 50mg daily, would not increase given asymptomatic sinus bradycardia to 40s  -- change Norvasc to Procardia XL 60mg daily for HTN    will arrange for OP F/U in the office, [phone]    Laura LEONARD  [phone]

## 2024-12-12 NOTE — DIETITIAN INITIAL EVALUATION ADULT - PERSON TAUGHT/METHOD
Pt provided with written and verbal nutrition education on type 2 DM diet. Topics discussed include: MyPlate healthy eating guidelines, avoidance of sugar sweetened beverages and recommended alternatives, label reading, sources of carbohydrates, carbohydrate counting, complex vs simple carbohydrates, inclusion of whole grains and fiber, mixed meals (pairing protein with carbohydrate for optimal blood glucose response), hypoglycemia prevention/management and timing of meals/snacks. Discussed importance of self monitoring blood glucose and encouraged outpatient endocrinology follow up. Pt verbalized understanding of nutrition education./verbal instruction/written material/patient instructed

## 2024-12-12 NOTE — DIETITIAN INITIAL EVALUATION ADULT - PHYSCIAL ASSESSMENT
Patient reports UBW of ~180lbs. Current body weight 78.7kg (bed scaled, at time of visit on 12/12). Patient reports weight loss of 3.8% x 3 weeks. [facility] weight history is not available at this time. Edema note, weight fluctuated may occur 2/2 fluid shifts.

## 2024-12-12 NOTE — DISCHARGE NOTE NURSING/CASE MANAGEMENT/SOCIAL WORK - NSDCPEFALRISK_GEN_ALL_CORE
For information on Fall & Injury Prevention, visit: [email or web address] OR  [email or web address] OR  https://www.cdc.gov/steadi/patient.html

## 2024-12-12 NOTE — DISCHARGE NOTE PROVIDER - NSDCDCMDCOMP_GEN_ALL_CORE
Rx sent into preferred pharmacy,see medication list for more details.    
This document is complete and the patient is ready for discharge.

## 2024-12-12 NOTE — CONSULT NOTE ADULT - SUBJECTIVE AND OBJECTIVE BOX
EP Attending  HISTORY OF PRESENT ILLNESS: HPI:  Pt is a 65 y/o M w/ pmhx oh HTN, IDDM, CKD, presenting with complaint of SOB x 2 weeks, Pt states symptoms initially started after he ate fried vegetables 2 weeks ago. He states he had 1 episode of nbnb emesis at that time. He states he had intermittent fevers at night and overall did not feel well since. He states he had a few episode of nonbloody diarrhea as well which has since resolved. He states he now gets SOB after walking a few steps. He denies any leg swelling, cough, or orthopnea. He reports overall poor PO intake the past week and has been skipping his insulin and home medications since not feeling well.     In ED, vitals T97.9 , HR 90, /75, RR 18, O2 sat 100% on RA  Labs significant for: Hb 9.3, BUN/Cr 48/4.17, glucose 198   EKG personally reviewed: sinus rhythm w/ PACs, no acute ischemic changes  CXR: clear lungs  Imaging: U/S kidneys: normal renal u/s  ED management: lasix 40 mg PO x1, IV lasix 40 mg x1, lokelma 10 mg x1 (10 Dec 2024 23:40)    Pt with known history of CAD/DM/HTN, not under good management, has no-showed multiple apptmts with different providers.  Not having any angina or lightheadedness. No fainting.  Here w/ diarrhea and exertional fatigue.  A 10 pt ROS is otherwise negative.  EP called re: bradycardia on EKG.    PAST MEDICAL & SURGICAL HISTORY:  Type II or unspecified type diabetes mellitus without mention of complication, not stated as uncontr  Unspecified essential hypertension  CAD (coronary artery disease)  Hypoglycemia  HLD (hyperlipidemia)  S/P cardiac catheterization  2010 (stent placement?)    MEDICATIONS  (STANDING):  atorvastatin 10 milliGRAM(s) Oral at bedtime  calcitriol   Capsule 0.25 MICROGram(s) Oral daily  dextrose 5%. 1000 milliLiter(s) (50 mL/Hr) IV Continuous <Continuous>  dextrose 50% Injectable 25 Gram(s) IV Push once  glucagon  Injectable 1 milliGRAM(s) IntraMuscular once  heparin   Injectable 5000 Unit(s) SubCutaneous every 8 hours  influenza  Vaccine (HIGH DOSE) 0.5 milliLiter(s) IntraMuscular once  insulin glargine Injectable (LANTUS) 25 Unit(s) SubCutaneous every morning  insulin lispro (ADMELOG) corrective regimen sliding scale   SubCutaneous three times a day before meals  insulin lispro (ADMELOG) corrective regimen sliding scale   SubCutaneous at bedtime  insulin lispro Injectable (ADMELOG) 2 Unit(s) SubCutaneous three times a day before meals  magnesium oxide 400 milliGRAM(s) Oral every 4 hours  metoprolol succinate ER 50 milliGRAM(s) Oral <User Schedule>  NIFEdipine XL 60 milliGRAM(s) Oral <User Schedule>  sodium bicarbonate 650 milliGRAM(s) Oral three times a day    Allergies    penicillins (Swelling)  ampicillin (Swelling)    Intolerances    FAMILY HISTORY:  No pertinent family history in first degree relatives    Non-contributary for premature coronary disease or sudden cardiac death    SOCIAL HISTORY:    [x ] Non-smoker  [ ] Smoker  [ ] Alcohol      REVIEW OF SYSTEMS:  [ ]chest pain  [  ]shortness of breath  [  ]palpitations  [  ]syncope  [ ]near syncope [ ]upper extremity weakness   [ ] lower extremity weakness  [  ]diplopia  [  ]altered mental status   [  ]fevers  [ ]chills [ ]nausea  [ ]vomitting  [  ]dysphagia    [ ]abdominal pain  [ ]melena  [ ]BRBPR    [  ]epistaxis  [  ]rash    [ ]lower extremity edema        [ ] All others negative	  [ ] Unable to obtain    PHYSICAL EXAM:  T(C): 36.8 (12-12-24 @ 11:11), Max: 36.9 (12-11-24 @ 17:00)  HR: 68 (12-12-24 @ 11:11) (68 - 76)  BP: 142/71 (12-12-24 @ 11:11) (142/71 - 151/69)  RR: 18 (12-12-24 @ 11:11) (18 - 18)  SpO2: 99% (12-12-24 @ 11:11) (96% - 99%)  Wt(kg): --    Appearance: Normal appearing adult male in no acute distress	  HEENT:   Normal oral mucosa, PERRL, EOMI	  Lymphatic: No lymphadenopathy , no edema  Cardiovascular: Normal S1 S2, No JVD, No murmurs , Peripheral pulses palpable 2+ bilaterally  Respiratory: Lungs clear to auscultation, normal effort 	  Gastrointestinal:  Soft, Non-tender, + BS	  Skin: No rashes, No ecchymoses, No cyanosis, warm to touch  Musculoskeletal: Normal range of motion, normal strength  Psychiatry:  Mood & affect appropriate    TELEMETRY: NSR, short bursts of PAT, blocked early APCs. 	    ECG: NSR  Echo:  < from: TTE W or WO Ultrasound Enhancing Agent (12.11.24 @ 11:58) >  CONCLUSIONS:      1. The left ventricular cavity is normal in size. Left ventricular wall thickness is moderately increased. Left ventricular systolic function is normal with a calculated ejection fraction of 59 % by the Bauer's biplane method of disks. There are no regional wall motion abnormalities seen. There is increased LV mass and concentric hypertrophy. There is mild (grade 1) left ventricular diastolic dysfunction.   2. Normal right ventricular cavity size and normal right ventricular systolic function.   3. Structurally normal mitral valve with normal leaflet excursion. There is calcification of the mitral valve annulus. There is mild mitral regurgitation.    < end of copied text >  < from: TTE W or WO Ultrasound Enhancing Agent (12.11.24 @ 11:58) >     Left Ventricle:  The left ventricular cavity is normal in size. Left ventricular wall thickness is moderately increased. Left ventricular systolic function is normal with a calculated ejection fraction of 59 % by the Bauer's biplane method of disks. There are no regional wall motion abnormalities seen. There is increased LV mass and concentric hypertrophy. There is mild (grade 1) left ventricular diastolic dysfunction.     Right Ventricle:  The right ventricular cavity is normal in size and right ventricular systolic function is normal. Tricuspid annular plane systolic excursion (TAPSE) is 1.8 cm (normal >=1.7 cm).     Left Atrium:  The left atrium is normal in size with an indexed volume of 24.89 ml/m².     Right Atrium:  The right atrium is normal in size with an indexed volume of 18.84 ml/m² and an indexed area of 7.93 cm²/m².     Aortic Valve:  The aortic valve appears trileaflet with normal systolic excursion. There is calcification of the aortic valve leaflets. There is no evidence of aortic regurgitation.     Mitral Valve:  Structurally normal mitral valve with normal leaflet excursion. There is calcification of the mitral valve annulus. There is mild mitral regurgitation.     Tricuspid Valve:  Structurally normal tricuspid valve with normal leaflet excursion. There is trace tricuspid regurgitation.     Pulmonic Valve:  Structurally normal pulmonic valve with normal leaflet excursion. Thereis trace pulmonic regurgitation.     Aorta:  The aortic root at the sinuses of Valsalva is normal in size, measuring 3.40 cm (indexed 1.69 cm/m²). The ascending aorta is normal in size, measuring 3.20 cm (indexed 1.59 cm/m²).     Pericardium:  No pericardial effusion seen.     Systemic Veins:  The inferior vena cava is normal in size measuring 1.69 cm in diameter, (normal <2.1cm) with normal inspiratory collapse (normal >50%) consistent with normal right atrial pressure (~3, range 0-5mmHg).    < end of copied text >  	  LABS:	 	                          8.4    6.14  )-----------( 415      ( 12 Dec 2024 04:15 )             27.0     12-12    133[L]  |  103  |  58[H]  ----------------------------<  203[H]  4.3   |  17[L]  |  4.39[H]    Ca    8.7      12 Dec 2024 04:15  Phos  4.9     12-12  Mg     1.70     12-12    TPro  8.2  /  Alb  3.5  /  TBili  0.3  /  DBili  x   /  AST  20  /  ALT  15  /  AlkPhos  152[H]  12-10  TSH: Thyroid Stimulating Hormone, Serum: 1.75 uIU/mL (12-12 @ 04:15)    ASSESSMENT/PLAN: Mr Schmitt is a very pleasant 64y Male here with SOB on exertion.  Diarrhea essentially resolved.  Has an acute kidney injury compared to last time.  Needs optimization of BP/DM management.   Can continue beta blockers for hx CAD.  Could even switch to Coreg, with better antihypertensive properties, targeting a BP of 130/80.  His bradycardia is due to (physiologically) blocked atrial premature beats.  No invasive management of this is required.  Maintain K 4-4.5, Mg 2.  Will follow with you.      Narendra Vuong M.D.  Cardiac Electrophysiology    office [phone]  pager [phone]

## 2024-12-12 NOTE — DIETITIAN INITIAL EVALUATION ADULT - ORAL INTAKE PTA/DIET HISTORY
Patient reports decreased appetite / PO intake in the past 3 weeks PTA.  Denies prior use of nutrition shakes/ vitamins PTA. No other reported issues.

## 2024-12-12 NOTE — DIETITIAN INITIAL EVALUATION ADULT - PROBLEM SELECTOR PLAN 5
Microcytic, Hb 9.3, last Hb 11.1 in 2018. Pt denies any bleeding/black stools. Possibly in setting of CKD.  - continue to monitor

## 2024-12-12 NOTE — DISCHARGE NOTE NURSING/CASE MANAGEMENT/SOCIAL WORK - PATIENT PORTAL LINK FT
You can access the FollowMyHealth Patient Portal offered by [facility] by registering at the following website: [email or web address]. By joining [employer]’s FollowMyHealth portal, you will also be able to view your health information using other applications (apps) compatible with our system.

## 2024-12-12 NOTE — CONSULT NOTE ADULT - REASON FOR ADMISSION
Progress Notes signed by Shy Leslie MD at 03/20/17 10:10 AM      Author:  Shy Leslie MD Service:  (none) Author Type:  Physician     Filed:  03/20/17 10:10 AM Encounter Date:  3/16/2017 Status:  Signed     :  Shy Leslie MD (Physician)               SUBJECTIVE:    Ms. Phalen is a 76-year-old female, who I saw a few months ago.  At that time, she was having some mild abdominal pain and belching.  We were asked to do a colonoscopy.  We also do an endoscopy at that time, however, because of her shortness of breath that had to be canceled.  From that point of view, she is doing much better.  However, over the last 2 months, she has been describing right lower quadrant pain.  It is actually more in the right groin.  It is very tender.  It is there constantly.  She has not had a full evaluation for this.  She does have a history of colitis.  She takes Colazal.  However, she is not having bowel changes.  She has no rectal bleeding or melena.  She feels well otherwise.  No fevers or chills.    OBJECTIVE:    Vital Signs:  Blood pressure 124/72, pulse 76, respiratory rate 12, weight 177 pounds.   General:  Patient is in no acute distress.  Patient is mildly obese.   HEENT:  Normocephalic, atraumatic.  Oropharynx clear.  No scleral icterus.   Neck:  Supple.  No lymphadenopathy.   Lungs:  Clear to auscultation bilaterally.   Cardiovascular:  Regular rate and rhythm.  Normal S1 and S2.  Abdomen:  Soft, nontender, nondistended.  Positive bowel sounds.  No guarding or rebound.     Extremities:  No clubbing, cyanosis, or edema.   Neurologic:  The patient is awake, alert, and oriented x3.   Skin:  No jaundice.  No caput medusae.  No evidence of chronic liver disease.    ASSESSMENT:    A 76-year-old female with right lower quadrant/right groin pain for the last 1 to 2 months.    PLAN:    1. At this point, I do not appreciate any type of hernia.  However, we will do a CT scan of the abdomen and pelvis for further 
margarita
evaluation.  2. If her CT scan is unremarkable and she continues to have pain, we can have her see General Surgery.  3. At some point, we will reschedule her upper endoscopy and colonoscopy.  The     patient will like to wait until her CT scan is complete and then we will schedule at that time.        ______________________________  Shy Leslie MD  GASTROENTEROLOGY    AB/modl  D:  03/19/2017 19:54:25  T:  03/19/2017 21:07:23  Job #:  [id number]     [AB1.1M]    Revision History        User Key Date/Time User Provider Type Action    > AB1.1 03/20/17 10:10 AM Shy Leslie MD Physician Sign    M - Manual            
margarita

## 2024-12-12 NOTE — DISCHARGE NOTE PROVIDER - NSDCCPCAREPLAN_GEN_ALL_CORE_FT
PRINCIPAL DISCHARGE DIAGNOSIS  Diagnosis: JORGITO (acute kidney injury)  Assessment and Plan of Treatment: Nephrology saw you. please follow up for further care.      SECONDARY DISCHARGE DIAGNOSES  Diagnosis: Shortness of breath  Assessment and Plan of Treatment: you have mild diastolic heart failure on echocardiogram likely as a result of kidney disease. kidney sonogram showed no new findings. You were given diuretics/lasix to remove excess fluid causing shortness of breath. Please follow up with cardiology. Your blood pressure medications were adjusted due to lower heart rate, rhythm changes, and also higher blood pressure.    Diagnosis: DM (diabetes mellitus)  Assessment and Plan of Treatment: A1c 7.5% Continue a consistent carbohydrate diet (Meaning eating the same amount of carbohydrates at the same time each day). Monitor blood glucose levels four times a day before meals and at bedtime. Record blood sugars and bring to outpatient providers appointment in order to be reviewed by your doctor for management modifications. If your sugars are more than 400 or less than 70 you should contact your Primary Care Physician immediately. Monitor for signs/symptoms of low blood glucose, such as, dizziness, altered mental status, or cool/clammy skin. In addition, monitor for signs/symptoms of high blood glucose, such as, feeling hot, dry, fatigued, or with increased thirst/urination.  Exercise daily for at least 30 minutes and weight loss.  Follow up with your primary care physician and endocrinologist for routine Hemoglobin A1C checks and management.  Follow up with your ophthalmologist for routine yearly vision exams. Make regular podiatry appointments in order to have feet checked for wounds.

## 2024-12-12 NOTE — PROGRESS NOTE ADULT - SUBJECTIVE AND OBJECTIVE BOX
[facility]ROLOGY [facility]ACT[facility]   MD MELANIE VÁSQUEZ MD ANGELA WONG, PA    TEL:  OFFICE: [phone]  From 5pm-7am Answering Service [phone]    -- RENAL FOLLOW UP NOTE ---Date of Service 12-12-24 @ 10:45    Patient is a 64y old  Male who presents with a chief complaint of margarita (12 Dec 2024 10:37)      Patient seen and examined at bedside. pt still feel slight salamanca    VITALS:  T(F): 98.1 (12-12-24 @ 05:32), Max: 98.4 (12-11-24 @ 17:00)  HR: 75 (12-12-24 @ 05:32)  BP: 151/69 (12-12-24 @ 05:32)  RR: 18 (12-12-24 @ 05:32)  SpO2: 99% (12-12-24 @ 05:32)  Wt(kg): --    12-11 @ 07:01  -  12-12 @ 07:00  --------------------------------------------------------  IN: 975 mL / OUT: 200 mL / NET: 775 mL          PHYSICAL EXAM:  General: NAD  Neck: No JVD  Respiratory: CTAB, no wheezes, rales or rhonchi  Cardiovascular: S1, S2, RRR  Gastrointestinal: BS+, soft, NT/ND  Extremities: No peripheral edema    Hospital Medications:   MEDICATIONS  (STANDING):  amLODIPine   Tablet 10 milliGRAM(s) Oral daily  atorvastatin 10 milliGRAM(s) Oral at bedtime  dextrose 5%. 1000 milliLiter(s) (50 mL/Hr) IV Continuous <Continuous>  dextrose 50% Injectable 25 Gram(s) IV Push once  glucagon  Injectable 1 milliGRAM(s) IntraMuscular once  heparin   Injectable 5000 Unit(s) SubCutaneous every 8 hours  influenza  Vaccine (HIGH DOSE) 0.5 milliLiter(s) IntraMuscular once  insulin glargine Injectable (LANTUS) 25 Unit(s) SubCutaneous every morning  insulin lispro (ADMELOG) corrective regimen sliding scale   SubCutaneous three times a day before meals  insulin lispro (ADMELOG) corrective regimen sliding scale   SubCutaneous at bedtime  insulin lispro Injectable (ADMELOG) 2 Unit(s) SubCutaneous three times a day before meals  magnesium oxide 400 milliGRAM(s) Oral every 4 hours  metoprolol succinate ER 50 milliGRAM(s) Oral daily  sodium bicarbonate 650 milliGRAM(s) Oral three times a day      LABS:  12-12    133[L]  |  103  |  58[H]  ----------------------------<  203[H]  4.3   |  17[L]  |  4.39[H]    Ca    8.7      12 Dec 2024 04:15  Phos  4.9     12-12  Mg     1.70     12-12    TPro  8.2  /  Alb  3.5  /  TBili  0.3  /  DBili      /  AST  20  /  ALT  15  /  AlkPhos  152[H]  12-10    Creatinine Trend: 4.39 <--, 4.28 <--, 4.17 <--, 4.07 <--    Phosphorus: 4.9 mg/dL (12-12 @ 04:15)  Ferritin: 316 ng/mL (12-12 @ 04:15)    calcium8.5  intact [id number]  parathyroid hormone intact, serum--                            8.4    6.14  )-----------( 415      ( 12 Dec 2024 04:15 )             27.0     Urine Studies:  Urinalysis - [12-12-24 @ 04:15]      Color  / Appearance  / SG  / pH       Gluc 203 / Ketone   / Bili  / Urobili        Blood  / Protein  / Leuk Est  / Nitrite       RBC  / WBC  / Hyaline  / Gran  / Sq Epi  / Non Sq Epi  / Bacteria       Iron 36, TIBC 169, %sat 21      [12-11-24 @ 05:20]  Ferritin 316      [12-12-24 @ 04:15]  PTH -- (Ca 8.5)      [12-12-24 @ 04:15]   257  TSH 1.75      [12-12-24 @ 04:15]  Lipid: chol 138, , HDL 23, LDL --      [12-12-24 @ 04:15]        RADIOLOGY & ADDITIONAL STUDIES:

## 2024-12-12 NOTE — DIETITIAN INITIAL EVALUATION ADULT - PERTINENT MEDS FT
MEDICATIONS  (STANDING):  atorvastatin 10 milliGRAM(s) Oral at bedtime  calcitriol   Capsule 0.25 MICROGram(s) Oral daily  dextrose 5%. 1000 milliLiter(s) (50 mL/Hr) IV Continuous <Continuous>  dextrose 50% Injectable 25 Gram(s) IV Push once  glucagon  Injectable 1 milliGRAM(s) IntraMuscular once  heparin   Injectable 5000 Unit(s) SubCutaneous every 8 hours  influenza  Vaccine (HIGH DOSE) 0.5 milliLiter(s) IntraMuscular once  insulin glargine Injectable (LANTUS) 25 Unit(s) SubCutaneous every morning  insulin lispro (ADMELOG) corrective regimen sliding scale   SubCutaneous three times a day before meals  insulin lispro (ADMELOG) corrective regimen sliding scale   SubCutaneous at bedtime  insulin lispro Injectable (ADMELOG) 2 Unit(s) SubCutaneous three times a day before meals  magnesium oxide 400 milliGRAM(s) Oral every 4 hours  metoprolol succinate ER 50 milliGRAM(s) Oral <User Schedule>  NIFEdipine XL 60 milliGRAM(s) Oral <User Schedule>  sodium bicarbonate 650 milliGRAM(s) Oral three times a day    MEDICATIONS  (PRN):  acetaminophen     Tablet .. 650 milliGRAM(s) Oral every 6 hours PRN Temp greater or equal to 38C (100.4F), Mild Pain (1 - 3)  dextrose Oral Gel 15 Gram(s) Oral once PRN Blood Glucose LESS THAN 70 milliGRAM(s)/deciliter  melatonin 3 milliGRAM(s) Oral at bedtime PRN Insomnia

## 2024-12-12 NOTE — PROGRESS NOTE ADULT - NS ATTEND AMEND GEN_ALL_CORE FT
Patient seen and examined. Agree with plan as detailed in PA/NP Note.     Likely hypertensive heart disease  Has concentric LVH  Change to Nifedipine  eventual ARB when JORGITO resolved  Avoid BB in setting of bradycardia    Preet Stone MD  Pager: [phone]
as above
as above

## 2024-12-12 NOTE — DIETITIAN INITIAL EVALUATION ADULT - PROBLEM SELECTOR PLAN 2
Pt presenting w/ 2 weeks of intermittent SOB. pro-bnp elevated to [address] however pt does not appear overloaded on exam. No LE edema, lungs clear, CXR w/o pulm edema. However given elevated pro-bnp concern for possible chf, low concern for PE. S/p lasix in ED  - monitor on tele  - f/u TTE  - monitor volume status

## 2024-12-12 NOTE — DISCHARGE NOTE PROVIDER - CARE PROVIDER_API CALL
Preet Stone  Interventional Cardiology  [id number]  [place] [address]  Phone: [phone]  Fax: [phone]  Follow Up Time:     Stanley Azul  Nephrology  [address] [place] [address]  Phone: [phone]  Fax: [phone]  Follow Up Time: 1 week    Margarito Lezama  Internal Medicine  [address] [place] [address]  Phone: [phone]  Fax: ()-  Follow Up Time: 2 weeks

## 2024-12-12 NOTE — DISCHARGE NOTE PROVIDER - CARE PROVIDERS DIRECT ADDRESSES
,DirectAddress_Unknown,[email or web address],DirectAddress_Unknown

## 2024-12-12 NOTE — DISCHARGE NOTE NURSING/CASE MANAGEMENT/SOCIAL WORK - NSDCPEWEB_GEN_ALL_CORE
[facility] for Tobacco Control website --- [email or web address] website --- [email or web address]

## 2024-12-12 NOTE — DIETITIAN INITIAL EVALUATION ADULT - REASON FOR ADMISSION
Per chart review, Pt is a 64-year-old Male w/ pmhx oh HTN, IDDM, CKD, presenting with complaint of SOB x 2 weeks. Pt found to have JORGITO on CKD and admitted for further eval and management.

## 2024-12-12 NOTE — DISCHARGE NOTE PROVIDER - NSDCFUADDAPPT_GEN_ALL_CORE_FT
APPTS ARE READY TO BE MADE: [X] YES    Best Family or Patient Contact (if needed):    Additional Information about above appointments (if needed):    1: PCP  2: Dr. Preet Stone  3: Endocrinology  4: Nephrology    Other comments or requests:

## 2024-12-12 NOTE — PROVIDER CONTACT NOTE (OTHER) - SITUATION
pt had 4 beats of vtach  bp: 138/56 hr: 108  pt in bed watching tv no s/s of distress noted
Patient went brittany HR 34 unsustained on tele
Pt had 3 beats of VTACH

## 2024-12-12 NOTE — PROVIDER CONTACT NOTE (OTHER) - ASSESSMENT
Pt asymptomatic. Denies chest pain, SOB, and dizziness.
No chest pain or SOB, VS - /72, HR 74, O2 97% room air
pt had 4 beats of vtach  bp: 138/56 hr: 108  pt in bed watching tv no s/s of distress noted

## 2024-12-12 NOTE — DISCHARGE NOTE PROVIDER - PROVIDER TOKENS
PROVIDER:[TOKEN:[[id number]]],PROVIDER:[TOKEN:[[id number]],FOLLOWUP:[1 week]],PROVIDER:[TOKEN:[[id number]],FOLLOWUP:[2 weeks]]

## 2024-12-12 NOTE — DISCHARGE NOTE NURSING/CASE MANAGEMENT/SOCIAL WORK - NSDCPEEMAIL_GEN_ALL_CORE
[facility] for Tobacco Control email [email or web address]

## 2024-12-12 NOTE — DISCHARGE NOTE NURSING/CASE MANAGEMENT/SOCIAL WORK - NSDCVIVACCINE_GEN_ALL_CORE_FT
influenza, injectable, quadrivalent, preservative free; 19-Feb-2018 16:42; Ana Hidalgo (RN); [employer]; [id number]; IntraMuscular; Deltoid Right.; 0.5 milliLiter(s); VIS (VIS Published: 07-Aug-2015, VIS Presented: 19-Feb-2018);

## 2024-12-12 NOTE — DISCHARGE NOTE PROVIDER - NSFOLLOWUPCLINICS_GEN_ALL_ED_FT
[facility]  Endocrinology  [address] [place] [address]  Phone: [phone]  Fax:

## 2024-12-12 NOTE — PROGRESS NOTE ADULT - ASSESSMENT
64 hx of htn, id diabetes with a cc of shortness of breath for 2 weeks. Patient states 2 weeks prior patient ate vegetables from his wife and had a onset of shortness of breath, n/v/f/c. Patient also endorsing decreased appetite and diarhhea. Patient denying chest pain, headaches, neuro changes, stool changes or urinary changes. Patient states he felt this prior when he went to [facility] 1 yr prior and states he was given a medicine to help him pee and made him feel better  pt seen dr narvaez for ckd last visit in 2017 has not been following up since. last scr in office 1.9 GFR 44    acute on ckd stage 3 vs progression of ckd  per patient now follows up with Dr. Orlando [phone], no answer when called  per wife pt is ckd stage 4  now scr 4.07 on admission slightly elevated   ? JORGITO vs progression  renal us without hydro  check ua urine p/c urine cr, urine urea  hold ARB/HCTZ  still have salamanca will start lasix 40po bid  pt can be dc from renal stand point. pt advised to follow up with Dr Azul in 1 wk post discharge   avoid nephrotoxic agent    hyponatremia  sec to hyperglycemia  optimize dm control  monitor    hyperkalemia  sec to renal failure and hyperglycemia  better  hold arb  optimize dm control  low k diet  monitor    anemia  check iron panel  monitor  likely would need epo weekly. can follow up outpatient    sob  [id number] vix 1 12/11  start lasix 40 po bid  f/u cardio    acidosis  nonAG likely sec to renal failure  oral bicarb 650 tid  monitor    ckd-mbd  elevated pth start calcitriol 0.25 daily  monitor phos and calcium daily 64 hx of htn, id diabetes with a cc of shortness of breath for 2 weeks. Patient states 2 weeks prior patient ate vegetables from his wife and had a onset of shortness of breath, n/v/f/c. Patient also endorsing decreased appetite and diarhhea. Patient denying chest pain, headaches, neuro changes, stool changes or urinary changes. Patient states he felt this prior when he went to [facility] 1 yr prior and states he was given a medicine to help him pee and made him feel better  pt seen dr narvaez for ckd last visit in 2017 has not been following up since. last scr in office 1.9 GFR 44    acute on ckd stage 3 vs progression of ckd  per patient now follows up with Dr. Orlando [phone], no answer when called  per wife pt is ckd stage 4  now scr 4.07 on admission slightly elevated   ? JORGITO vs progression  renal us without hydro  check ua urine p/c urine cr, urine urea  hold ARB/HCTZ  still have salamanca will start lasix 40po bid  pt can be dc from renal stand point. pt advised to follow up with Dr Azul in 1 wk post discharge   avoid nephrotoxic agent    hyponatremia  sec to hyperglycemia  optimize dm control  monitor    hyperkalemia  sec to renal failure and hyperglycemia  better  hold arb  optimize dm control  low k diet  monitor    anemia  check iron panel  monitor  likely would need epo weekly. can follow up outpatient    htn  suboptimal  on norvasc 10  holding arb/hctz  start coreg 6.125 bid  monitor    sob  [id number] vix 1 12/11  start lasix 40 po bid  f/u cardio    acidosis  nonAG likely sec to renal failure  oral bicarb 650 tid  monitor    ckd-mbd  elevated pth start calcitriol 0.25 daily  monitor phos and calcium daily 64 hx of htn, id diabetes with a cc of shortness of breath for 2 weeks. Patient states 2 weeks prior patient ate vegetables from his wife and had a onset of shortness of breath, n/v/f/c. Patient also endorsing decreased appetite and diarhhea. Patient denying chest pain, headaches, neuro changes, stool changes or urinary changes. Patient states he felt this prior when he went to [facility] 1 yr prior and states he was given a medicine to help him pee and made him feel better  pt seen dr narvaez for ckd last visit in 2017 has not been following up since. last scr in office 1.9 GFR 44    acute on ckd stage 3 vs progression of ckd  per patient now follows up with Dr. Orlando [phone], no answer when called  per wife pt is ckd stage 4  now scr 4.07 on admission slightly elevated   ? JORGITO vs progression  renal us without hydro  check ua urine p/c urine cr, urine urea  hold ARB/HCTZ  still have salamanca will start lasix 40po bid  pt can be dc from renal stand point. pt advised to follow up with Dr Azul in 1 wk post discharge   avoid nephrotoxic agent    hyponatremia  sec to hyperglycemia  optimize dm control  monitor    hyperkalemia  sec to renal failure and hyperglycemia  better  hold arb  optimize dm control  low k diet  monitor    anemia  check iron panel  monitor  likely would need epo weekly. can follow up outpatient    htn  suboptimal  on norvasc 10  holding arb/hctz  increase Toprol   monitor    sob  [id number] vix 1 12/11  start lasix 40 po bid  f/u cardio    acidosis  nonAG likely sec to renal failure  oral bicarb 650 tid  monitor    ckd-mbd  elevated pth start calcitriol 0.25 daily  monitor phos and calcium daily 64 hx of htn, id diabetes with a cc of shortness of breath for 2 weeks. Patient states 2 weeks prior patient ate vegetables from his wife and had a onset of shortness of breath, n/v/f/c. Patient also endorsing decreased appetite and diarhhea. Patient denying chest pain, headaches, neuro changes, stool changes or urinary changes. Patient states he felt this prior when he went to [facility] 1 yr prior and states he was given a medicine to help him pee and made him feel better  pt seen dr narvaez for ckd last visit in 2017 has not been following up since. last scr in office 1.9 GFR 44    acute on ckd stage 3 vs progression of ckd  per wife pt is ckd stage 4  now scr 4.07 on admission slightly elevated   ? JORGITO vs progression  renal us without hydro  check ua urine p/c urine cr, urine urea  hold ARB/HCTZ  still have salamanca will start lasix 40po bid  pt can be dc from renal stand point. pt advised to follow up with Dr Azul in 1 wk post discharge   avoid nephrotoxic agent    hyponatremia  sec to hyperglycemia  optimize dm control  monitor    hyperkalemia  sec to renal failure and hyperglycemia  better  hold arb  optimize dm control  low k diet  monitor    anemia  check iron panel  monitor  likely would need epo weekly. can follow up outpatient    htn  suboptimal  on norvasc 10  holding arb/hctz  increase Toprol   monitor    sob  [id number] vix 1 12/11  start lasix 40 po bid  f/u cardio    acidosis  nonAG likely sec to renal failure  oral bicarb 650 tid  monitor    ckd-mbd  elevated pth start calcitriol 0.25 daily  monitor phos and calcium daily

## 2024-12-12 NOTE — DIETITIAN INITIAL EVALUATION ADULT - OTHER INFO
Met patient at bedside. Patient alert, A&O x4. Patient reports improved appetite in house. Per RN flowsheets intake is %. Patient denies any in house nausea, vomiting, diarrhea, or constipation. Last BM noted on 12/11 per patient. No reported chewing/swallowing issues. No known food allergies. Medications notable for insulin. Labs reviewed, A1c 7.5% indicating poor control, fingersticks between 138-280mg/dL, hyperphosphatemia (4.9) and hyponatremia (133) noted, potassium level was high, now is WNL, elevated lipids noted. Encourage PO intake and honor food preferences as able. Educated with carbohydrate counting for people with diabetes. RD to remain available for further nutritional interventions as indicated

## 2024-12-12 NOTE — DISCHARGE NOTE PROVIDER - NSDCMRMEDTOKEN_GEN_ALL_CORE_FT
amLODIPine 10 mg oral tablet: 1 tab(s) orally once a day  aspirin 81 mg oral delayed release tablet: 1 tab(s) orally once a day  Insulin Glargine-yfgn Prefilled Pen 100 units/mL subcutaneous solution: 30 unit(s) subcutaneous 2 times a day (prescribed as 40 units twice a day, pt states he uses 30 units twice a day)  losartan-hydrochlorothiazide 100mg-12.5mg oral tablet: 1 tab(s) orally once a day  metoprolol succinate 50 mg oral tablet, extended release: 1 tab(s) orally once a day   atorvastatin 10 mg oral tablet: 1 tab(s) orally once a day (at bedtime)  calcitriol 0.25 mcg oral capsule: 1 cap(s) orally once a day  furosemide 40 mg oral tablet: 1 tab(s) orally 2 times a day  Insulin Glargine-yfgn Prefilled Pen 100 units/mL subcutaneous solution: 25 unit(s) subcutaneous once a day (in the morning)  metoprolol succinate 50 mg oral tablet, extended release: 1 tab(s) orally once a day  NIFEdipine 60 mg oral tablet, extended release: 1 tab(s) orally once a day  sodium bicarbonate 650 mg oral tablet: 1 tab(s) orally 3 times a day

## 2024-12-12 NOTE — DIETITIAN INITIAL EVALUATION ADULT - NS FNS DIET ORDER
Diet, DASH/TLC:   Sodium & Cholesterol Restricted  Consistent Carbohydrate {Evening Snack} (CSTCHOSN)  No Concentrated Potassium  No Concentrated Phosphorus (12-12-24 @ 07:31)

## 2024-12-13 NOTE — PHARMACOTHERAPY INTERVENTION NOTE - COMMENTS
Discharge medications atorvastatin, calcitriol, furosemide, nifedipine and sodium bicarb were reviewed with patient via telephone. Current medication schedule was discussed in detail including: medication name, indication, dose, administration times, treatment duration, side effects, drug interactions, and special instructions. Patient questions and concerns were answered and addressed. Patient verbalized understanding.     Christen MuñizD, [facility]  Clinical Pharmacy Specialist  Spectra: [phone]  
Medication history is complete. Medication list verified by patient. Outpatient Medication Record (OMR) updated.     Of note:  -Patient states he is no longer using glyburide-metformin 5-500mg twice a day.    Please call spectra [phone] if you have any questions.

## 2024-12-18 NOTE — ED PROVIDER NOTE - GASTROINTESTINAL NEGATIVE STATEMENT, MLM
PRE OP HISTORY AND PHYSICAL EXAM - UROLOGY         Date of visit   12/18/24    Date of Procedure  1/07/25    Surgeon  Francisco Tsang MD    CHIEF COMPLAINT  bladder urothelial cell carcinoma, high-grade, muscle invasive     HISTORY OF PRESENT ILLNESS    This is a 63 year old male who is here for history and physical for bladder urothelial cell carcinoma, high-grade, muscle invasive obtained transurethral resection of bladder tumor 15 cm anterior and bladder neck tumor 4/30/2024. Pt has been drinking protein shakes and has gained 4 lbs recently. Patient denies abdominal or flank pain.  No nausea or vomiting. No fever, chills. Patient denies urgency, frequency of urination, dysuria or gross hematuria. No chest pain or shortness of breath. No pacemaker or defibrillator. Stream is adequate.        PAST MEDICAL HISTORY      Bladder cancer  (CMD)                                         PAST SURGICAL HISTORY      APPENDECTOMY                                                  SHOULDER ARTHROSCOPY                            01/01/2014      Comment: Right shoulder    REMOVAL BLADDER TUMOR/PERCUT                                  CYSTOSCOPY                                      04/30/2024      Comment: TURBT    OUTPATIENT MEDICATIONS  Current Outpatient Medications   Medication Sig    omeprazole (PrilOSEC) 20 MG capsule Take 1 capsule by mouth in the morning and 1 capsule in the evening. Take before meals.    ondansetron (ZOFRAN ODT) 8 MG disintegrating tablet Place 1 tablet onto the tongue every 8 hours as needed for Nausea.    calcium carbonate (TUMS) 500 MG chewable tablet Chew 1 tablet by mouth as needed for Heartburn.    [START ON 1/6/2025] neomycin (MYCIFRADIN) 500 MG tablet Take 2 tablets by mouth 1 time for 1 dose. Begin taking on January 6, 2025. Take per surgery instructions    [START ON 1/6/2025] electrolyte/PEG 3350 (GOLYTELY) 236 g solution Take 4,000 mLs by mouth 1 time for 1 dose. Begin taking on January 6, 2025.  Use per surgery instructions (Patient not taking: Begin taking on January 6, 2025. Reported on 11/8/2024)    Magnesium Oxide 400 MG Cap Take 400 mg by mouth daily.    tamsulosin (FLOMAX) 0.4 MG Cap Take 1 capsule by mouth daily.    vitamin B-12 (CYANOCOBALAMIN) 1000 MCG tablet Take 1,000 mcg by mouth daily.    naproxen sodium (ALEVE) 220 MG tablet Take 220 mg by mouth in the morning and 220 mg in the evening. Take with meals.    Multiple Vitamins-Minerals (CENTRUM SILVER 50+MEN PO) Take 1 tablet by mouth daily.    Cranberry 125 MG Tab Take 125 mg by mouth daily.    cholecalciferol (VITAMIN D) 25 mcg (1,000 units) tablet Take by mouth daily.     No current facility-administered medications for this visit.       ALLERGIES    ALLERGIES:  No Known Allergies    FAMILY HISTORY    Family History   Problem Relation Age of Onset    Heart Mother     Hypertension Mother     Stroke Mother     Diabetes Sister        SOCIAL HISTORY  Social History     Occupational History    Not on file   Tobacco Use    Smoking status: Every Day     Current packs/day: 0.50     Average packs/day: 0.5 packs/day for 50.3 years (25.1 ttl pk-yrs)     Types: Cigarettes     Start date: 9/17/1974    Smokeless tobacco: Never   Vaping Use    Vaping status: never used   Substance and Sexual Activity    Alcohol use: No    Drug use: Yes     Frequency: 7.0 times per week     Types: Marijuana     Comment: Aware to hold for surgery on 4/30/24    Sexual activity: Yes     Partners: Male           REVIEW OF SYSTEMS    A 13 point review of systems was conducted and all systems were negative    PHYSICAL EXAM    Vital Signs:   Blood pressure 138/86, pulse 67, temperature 99.1 °F (37.3 °C), temperature source Temporal, resp. rate 17, height 6' 4\" (1.93 m), weight 67.2 kg (148 lb 2.4 oz), SpO2 98%.   General: The patient is well developed, well nourished. No acute distress, nontoxic. Appears stated age.   Neurologic: Alert and oriented. Normal mood and affect. Cranial  Nerves II-XII grossly intact without focal deficits.   Skin: Warm and dry. Exposed areas without rash.   HEENT: Normocephalic, atraumatic. EOMI, PEERL, sclera anicteric. Nares patent. Oropharynx is clear, mucous membranes are moist.   Neck: Supple and symmetric without swelling or tenderness. No lymphadenopathy or jugular venous distention.   Respiratory: Respiratory effort normal. Clear to auscultation bilaterally without wheeze.  Cardiovascular: Regular rate and rhythm. No murmur.   Back: No costovertebral angle tenderness.    Abdomen: Bowel sounds normal. Abdomen is soft, non-tender, non-distended. Bladder and kidneys are nonpalpable.  There is no hepatosplenomegaly. There are no other abdominal masses present.  Extremities: No Clubbing, cyanosis, or edema.       STUDIES  Laboratory Results:      Urinalysis:  Office Visit on 12/18/2024   Component Date Value    POCT Color 12/18/2024 Yellow     POCT Appearance 12/18/2024 Cloudy     POCT Glucose Urine 12/18/2024 Negative     POCT Bilirubin 12/18/2024 Negative     POCT Ketones 12/18/2024 Negative     POCT Specific Gravity 12/18/2024 1.015     POCT Occult Blood 12/18/2024 Moderate (A)     POCT pH 12/18/2024 7.5     POCT Protein 12/18/2024 30 mg/dL (A)     POCT Urobilinogen 12/18/2024 0.2     Urine Nitrite 12/18/2024 Positive (A)     WBC (Leukocyte) Esterase* 12/18/2024 Large (A)     Internal Procedural Cont* 12/18/2024 Yes     TEST LOT EXPIRATION DATE 12/18/2024 9/30/25     TEST LOT NUMBER 12/18/2024 [id number]         All available tests, imaging, and procedures were reviewed with the patient today in the office.    ASSESSMENT    Bladder urothelial cell carcinoma, high-grade, muscle invasive     PLAN    UA showed moderate blood, positive nitrites and large leukocytes. This will be sent for analysis.     Pt is scheduled for radical cystoprostatectomy with ileal conduit creation for high-grade muscle invasive bladder urothelial cell carcinoma in the operating room. Risks  and benefits were discussed. Specific risks to the procedure were discussed. These include, but are not limited to  bleeding, infection, urine leak, bowel leak, impotency, ureteral stricture, and prolonged ileus. We discussed the usual hospital stay including the possible use of the intensive care unit, the possible need for nutritional support, and the complication rate. The patient expressed an understanding of the discussion and was given the opportunity to have questions answered. Questions were answered.    In addition, there are risks of general anesthesia to include heart attack, blood clots, stroke or death. The patient verbalizes understanding. All questions have been answered to the patient's satisfaction.  Patient understands that she would need to stop all blood thinning medication seven days prior to procedure, including ASA, NSAIDs, and 2 weeks for vitamins/supplements. All home medications were reviewed with the patient for the scheduled surgery.  Pt has written copy of the preoperative instructions.     Pt will have preoperative lab work drawn within the dates given.        Michelle HORTON       no abdominal pain, no diarrhea, no nausea and no vomiting.

## 2024-12-24 ENCOUNTER — EMERGENCY (EMERGENCY)
Facility: HOSPITAL | Age: 64
LOS: 0 days | Discharge: HOME | End: 2024-12-24
Attending: STUDENT IN AN ORGANIZED HEALTH CARE EDUCATION/TRAINING PROGRAM
Payer: MEDICARE

## 2024-12-24 VITALS
SYSTOLIC BLOOD PRESSURE: 131 MMHG | HEIGHT: 71 IN | HEART RATE: 86 BPM | WEIGHT: 179.9 LBS | OXYGEN SATURATION: 97 % | RESPIRATION RATE: 19 BRPM | DIASTOLIC BLOOD PRESSURE: 75 MMHG | TEMPERATURE: 98 F

## 2024-12-24 VITALS
DIASTOLIC BLOOD PRESSURE: 73 MMHG | RESPIRATION RATE: 14 BRPM | OXYGEN SATURATION: 98 % | TEMPERATURE: 98 F | HEART RATE: 97 BPM | SYSTOLIC BLOOD PRESSURE: 152 MMHG

## 2024-12-24 DIAGNOSIS — E78.5 HYPERLIPIDEMIA, UNSPECIFIED: ICD-10-CM

## 2024-12-24 DIAGNOSIS — R06.02 SHORTNESS OF BREATH: ICD-10-CM

## 2024-12-24 DIAGNOSIS — E11.22 TYPE 2 DIABETES MELLITUS WITH DIABETIC CHRONIC KIDNEY DISEASE: ICD-10-CM

## 2024-12-24 DIAGNOSIS — R21 RASH AND OTHER NONSPECIFIC SKIN ERUPTION: ICD-10-CM

## 2024-12-24 DIAGNOSIS — I50.9 HEART FAILURE, UNSPECIFIED: ICD-10-CM

## 2024-12-24 DIAGNOSIS — R42 DIZZINESS AND GIDDINESS: ICD-10-CM

## 2024-12-24 LAB
ALBUMIN SERPL ELPH-MCNC: 2.9 G/DL — LOW (ref 3.3–5)
ALP SERPL-CCNC: 93 U/L — SIGNIFICANT CHANGE UP (ref 40–120)
ALT FLD-CCNC: 20 U/L — SIGNIFICANT CHANGE UP (ref 12–78)
ANION GAP SERPL CALC-SCNC: 4 MMOL/L — LOW (ref 5–17)
ANISOCYTOSIS BLD QL: SLIGHT — SIGNIFICANT CHANGE UP
AST SERPL-CCNC: 16 U/L — SIGNIFICANT CHANGE UP (ref 15–37)
BASOPHILS # BLD AUTO: 0 K/UL — SIGNIFICANT CHANGE UP (ref 0–0.2)
BASOPHILS NFR BLD AUTO: 0 % — SIGNIFICANT CHANGE UP (ref 0–2)
BILIRUB SERPL-MCNC: 0.2 MG/DL — SIGNIFICANT CHANGE UP (ref 0.2–1.2)
BUN SERPL-MCNC: 55 MG/DL — HIGH (ref 7–23)
CALCIUM SERPL-MCNC: 9.1 MG/DL — SIGNIFICANT CHANGE UP (ref 8.5–10.1)
CHLORIDE SERPL-SCNC: 109 MMOL/L — HIGH (ref 96–108)
CO2 SERPL-SCNC: 23 MMOL/L — SIGNIFICANT CHANGE UP (ref 22–31)
CREAT SERPL-MCNC: 4.51 MG/DL — HIGH (ref 0.5–1.3)
EGFR: 14 ML/MIN/1.73M2 — LOW
EOSINOPHIL # BLD AUTO: 0.24 K/UL — SIGNIFICANT CHANGE UP (ref 0–0.5)
EOSINOPHIL NFR BLD AUTO: 4 % — SIGNIFICANT CHANGE UP (ref 0–6)
FLUAV AG NPH QL: SIGNIFICANT CHANGE UP
FLUBV AG NPH QL: SIGNIFICANT CHANGE UP
GLUCOSE SERPL-MCNC: 87 MG/DL — SIGNIFICANT CHANGE UP (ref 70–99)
HCT VFR BLD CALC: 31.5 % — LOW (ref 39–50)
HGB BLD-MCNC: 9.3 G/DL — LOW (ref 13–17)
HYPOCHROMIA BLD QL: SLIGHT — SIGNIFICANT CHANGE UP
LYMPHOCYTES # BLD AUTO: 0.47 K/UL — LOW (ref 1–3.3)
LYMPHOCYTES # BLD AUTO: 8 % — LOW (ref 13–44)
MANUAL SMEAR VERIFICATION: SIGNIFICANT CHANGE UP
MCHC RBC-ENTMCNC: 21.1 PG — LOW (ref 27–34)
MCHC RBC-ENTMCNC: 29.5 G/DL — LOW (ref 32–36)
MCV RBC AUTO: 71.4 FL — LOW (ref 80–100)
METAMYELOCYTES # FLD: 2 % — HIGH (ref 0–0)
MICROCYTES BLD QL: SIGNIFICANT CHANGE UP
MONOCYTES # BLD AUTO: 1.36 K/UL — HIGH (ref 0–0.9)
MONOCYTES NFR BLD AUTO: 23 % — HIGH (ref 2–14)
NEUTROPHILS # BLD AUTO: 3.72 K/UL — SIGNIFICANT CHANGE UP (ref 1.8–7.4)
NEUTROPHILS NFR BLD AUTO: 63 % — SIGNIFICANT CHANGE UP (ref 43–77)
NRBC # BLD: 0 /100 WBCS — SIGNIFICANT CHANGE UP (ref 0–0)
NRBC # BLD: SIGNIFICANT CHANGE UP /100 WBCS (ref 0–0)
NT-PROBNP SERPL-SCNC: 3929 PG/ML — HIGH (ref 0–125)
PLAT MORPH BLD: NORMAL — SIGNIFICANT CHANGE UP
PLATELET # BLD AUTO: 375 K/UL — SIGNIFICANT CHANGE UP (ref 150–400)
POIKILOCYTOSIS BLD QL AUTO: SLIGHT — SIGNIFICANT CHANGE UP
POTASSIUM SERPL-MCNC: 5.3 MMOL/L — SIGNIFICANT CHANGE UP (ref 3.5–5.3)
POTASSIUM SERPL-SCNC: 5.3 MMOL/L — SIGNIFICANT CHANGE UP (ref 3.5–5.3)
PROT SERPL-MCNC: 8 GM/DL — SIGNIFICANT CHANGE UP (ref 6–8.3)
RBC # BLD: 4.41 M/UL — SIGNIFICANT CHANGE UP (ref 4.2–5.8)
RBC # FLD: 18.2 % — HIGH (ref 10.3–14.5)
RBC BLD AUTO: ABNORMAL
RSV RNA NPH QL NAA+NON-PROBE: DETECTED
SARS-COV-2 RNA SPEC QL NAA+PROBE: SIGNIFICANT CHANGE UP
SODIUM SERPL-SCNC: 136 MMOL/L — SIGNIFICANT CHANGE UP (ref 135–145)
TROPONIN I, HIGH SENSITIVITY RESULT: 59.7 NG/L — SIGNIFICANT CHANGE UP
TROPONIN I, HIGH SENSITIVITY RESULT: 62.5 NG/L — SIGNIFICANT CHANGE UP
WBC # BLD: 5.9 K/UL — SIGNIFICANT CHANGE UP (ref 3.8–10.5)
WBC # FLD AUTO: 5.9 K/UL — SIGNIFICANT CHANGE UP (ref 3.8–10.5)

## 2024-12-24 PROCEDURE — 93010 ELECTROCARDIOGRAM REPORT: CPT

## 2024-12-24 PROCEDURE — 71046 X-RAY EXAM CHEST 2 VIEWS: CPT | Mod: 26

## 2024-12-24 PROCEDURE — 99284 EMERGENCY DEPT VISIT MOD MDM: CPT

## 2024-12-24 RX ORDER — VALACYCLOVIR HYDROCHLORIDE 1 G/1
1 TABLET, FILM COATED ORAL
Qty: 30 | Refills: 0
Start: 2024-12-24 | End: 2025-01-02

## 2024-12-24 RX ORDER — VALACYCLOVIR HYDROCHLORIDE 1 G/1
1000 TABLET, FILM COATED ORAL ONCE
Refills: 0 | Status: COMPLETED | OUTPATIENT
Start: 2024-12-24 | End: 2024-12-24

## 2024-12-24 RX ADMIN — VALACYCLOVIR HYDROCHLORIDE 1000 MILLIGRAM(S): 1 TABLET, FILM COATED ORAL at 16:35

## 2024-12-24 NOTE — ED ADULT NURSE NOTE - OBJECTIVE STATEMENT
64 year old male A/Ox3 c/o headache and sob when ambulating x saturday, pt also reports blurry vision and "foggy" vision on the left eye, noted rash to the left side of the head, pt denies dizziness, lightheadedness

## 2024-12-24 NOTE — ED PROVIDER NOTE - CLINICAL SUMMARY MEDICAL DECISION MAKING FREE TEXT BOX
64-year-old male with a past medical history of hypertension, hyperlipidemia, diabetes, CKD, CHF presenting with rash and shortness of breath. Patient states he developed a rash on the left forehead 4 days ago. Patient also complaining of shortness of breath for 3 days. Shortness of breath worsens with exertion. Patient states when he takes his new blood pressure medications that he was discharged on 2 weeks ago he becomes lightheaded. Denies fevers, chest pain, vomiting, abdominal pain, changes in vision, eye pain. Physical exam reveals well-appearing male, vesicular rash on left forehead in a dermatomal distribution, heart rate regular, clear breath sounds bilaterally, no respiratory distress, soft nontender abdomen, no lower extremity edema. Will treat patient for shingles, CBC, CMP, troponin, BNP, chest x-ray. 64-year-old male with a past medical history of hypertension, hyperlipidemia, diabetes, CKD, CHF presenting with rash and shortness of breath. Patient states he developed a rash on the left forehead 4 days ago. Patient also complaining of shortness of breath for 3 days. Shortness of breath worsens with exertion. Patient states when he takes his new blood pressure medications that he was discharged on 2 weeks ago he becomes lightheaded. Denies fevers, chest pain, vomiting, abdominal pain, changes in vision, eye pain. Physical exam reveals well-appearing male, vesicular rash on left forehead in a dermatomal distribution, heart rate regular, clear breath sounds bilaterally, no respiratory distress, soft nontender abdomen, no lower extremity edema, Fluorescein exam reveals no corneal lesions on the left, no rash in left ear canal.. Will treat patient for shingles, CBC, CMP, troponin, BNP, chest x-ray.

## 2024-12-24 NOTE — ED PROVIDER NOTE - NSFOLLOWUPINSTRUCTIONS_ED_ALL_ED_FT
Please follow up with your primary care physician within the next 4-6 days.    Medication has been sent to your pharmacy, please take as directed.     Please continue taking your medications as prescribed by your doctors.     Please return to the emergency department if you experience any of the following symptoms:    Fever  Chest pain  Difficulty breathing  Abdominal pain  Nausea  Vomiting   Changes in vision  Worsening rash

## 2024-12-24 NOTE — ED ADULT TRIAGE NOTE - CHIEF COMPLAINT QUOTE
BIBA from home for painful rash on left side of head x 3 days, patient is also complaining of sob upon exertion, dizziness, nausea x 3 days but states on december was also seen for sob and dizziness, patient is able to talk in full sentences

## 2024-12-24 NOTE — ED PROVIDER NOTE - PROGRESS NOTE DETAILS
Yomaira DO: Patient reassessed at bedside with improvement in symptoms. Repeat troponin within normal limits and minimal change. Patient is not clinically overloaded. Valtrex in the pharmacy. Will have patient follow-up with PCP within 1 week.

## 2024-12-24 NOTE — ED PROVIDER NOTE - CROS ED ROS STATEMENT
Called as a new pt courtesy call - spoke w patient. Did not receive paperwork, pt unable to to print off. Told pt to arrive hour early to fill out nppw in office. Told [facility].
all other ROS negative except as per HPI

## 2024-12-24 NOTE — ED PROVIDER NOTE - PATIENT PORTAL LINK FT
You can access the FollowMyHealth Patient Portal offered by [facility] by registering at the following website: [email or web address]. By joining [employer]’s FollowMyHealth portal, you will also be able to view your health information using other applications (apps) compatible with our system.

## 2024-12-24 NOTE — ED PROVIDER NOTE - HIV OFFER
HIGHLIGHTS  Kidney function remains stable at 30-35% over the most recent 5 years based on blood tests  No urine protein--that's good  Urine underneath the microscope looks bland-that's good  Potassium is normal  Blood acidity is normal  Calcium and phosphorous are normal  Vitamin D level will be evaluated with laboratories by Dr. Ariane Ramirez  Parathyroid gland is under good control with calcitriol  There is no evidence for anemia--red blood cell count looks excellent    Uric acid level was high in December--that leads to predispose to recurrent gout attacks--prednisone helps an acute out attack but not preventing future gout attacks right--that's why we will give you a medicine called allopurinol to help    Like the effect of levofloxacin--if you're finger or hand gets worse when that medication is discontinued--call Dr. Ariane Ramirez's office--or call my office to get no action    MEDICATIONS TO STOP  None    MEDICATIONS TO ADD  Allopurinol 150 mg daily    MEDICATION DOSE CHANGES  None    BLOOD TESTS ORDERED  June 2018 - will discuss at office visit    STUDIES/TESTS ORDERED  None    ADDITIONAL INSTRUCTIONS  Weight decreased by 7 pounds since last year--that's approximately 25,000-30,000 nissa--need to increase intake by 200-250 nissa a day plus increased protein by 25-30 g--1 protein bar per day or 2 scoops of whey protein daily mixed in with her [place] Instant Breakfast or 3-4 ounces more meat day    If skin rash/itching with allopurinol - stop allopurinol -- take benadryl -- call office    FOLLOW UP VISIT  July 2018   Previously Declined (within the last year)

## 2024-12-24 NOTE — ED PROVIDER NOTE - PHYSICAL EXAMINATION
General: Appears well and nontoxic  Mentation: AAO x 3  psych: mood appropriate  HEENT: airway patent, conjunctivae clear bilaterally, Fluorescein exam reveals no corneal lesions in left eye, no rash in left ear canal  Resp: symmetric chest rise, no resp distress, breath sounds CTA bilaterally  Cardio: RRR, no m/r/g  GI: soft/nondistended/nontender  : no CVA tenderness  Neuro: sensation and motor function grossly intact  Skin: no cyanosis, no jaundice, Vesicular rash on left forehead in dermatomal distribution  MSK: normal movement of all extremities  Lymph/Vasc: no LE edema

## 2024-12-24 NOTE — ED ADULT TRIAGE NOTE - MEANS OF ARRIVAL
stretcher
PAST SURGICAL HISTORY:  H/O left knee surgery post -op DVT    H/O right knee surgery     H/O tubal ligation     History of ankle surgery     History of cholecystectomy

## 2025-05-12 NOTE — H&P ADULT - NSHPPOAURINARYCATHETER_GEN_ALL_CORE
Naina Mario is here today for: 24-2 vf    The test was performed with good compliance.    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     Alaway one drop twice a day in both eyes for itching.          Patient phone number: [phone]    Pending appointment: Today    Juanpablo Chopra 5/12/2025     no

## 2025-05-14 NOTE — PATIENT PROFILE ADULT. - NS PRO INDICAT FLU
Addended by: DAYNE HUNTER on: 5/14/2025 03:36 PM     Modules accepted: Orders     Patient is 18 years or older

## 2025-07-07 NOTE — ED ADULT NURSE NOTE - NS ED NURSE IV DC DT
ANTICOAGULATION MANAGEMENT     Matt Rodriguez 68 year old male is on warfarin with supratherapeutic INR result. (Goal INR 2.0-3.0)    Recent labs: (last 7 days)     07/06/25  0820   INR 4*       ASSESSMENT     Source(s): Chart Review and Patient/Caregiver Call     Warfarin doses taken: Warfarin taken as instructed  Diet: not consistent with diet.  Medication/supplement changes: None noted  New illness, injury, or hospitalization: No  Signs or symptoms of bleeding or clotting: No  Previous result: Therapeutic last 2(+) visits  Additional findings: None       PLAN     Recommended plan for temporary change(s) affecting INR     Dosing Instructions: partial hold then continue your current warfarin dose with next INR in 4 days       Summary  As of 7/7/2025      Full warfarin instructions:  7/7: 2.5 mg; Otherwise 7.5 mg every Fri; 5 mg all other days   Next INR check:  7/11/2025               Telephone call with Matt who verbalizes understanding and agrees to plan    Patient to recheck with home meter    Education provided: None required    Plan made per [facility] anticoagulation protocol    Carmita Quiles RN  7/7/2025  Anticoagulation Clinic  [place] for routing messages: p ANTICOAG HOME MONITORING  [facility] patient phone line: [phone]        _______________________________________________________________________     Anticoagulation Episode Summary       Current INR goal:  2.0-3.0   TTR:  84.8% (1 y)   Target end date:  Indefinite   Send INR reminders to:  ANTICOAG HOME MONITORING    Indications    Thrombophilia [D68.59]  Long term (current) use of anticoagulants [Z79.01]  History of pulmonary embolism [Z86.711]             Comments:  Selena home meter, MBE             Anticoagulation Care Providers       Provider Role Specialty Phone number    Dl Allen MD Referring Family Medicine [phone]            
24-Dec-2024 21:16

## 2025-07-20 NOTE — CONSULT NOTE ADULT - RS GEN HX ROS MEA POS PC
Reviewed questionnaire    Reviewed meds/allergies    Dx Tooth pain    Plan Rx given for augmentin, follow up with dentist as planned    Time spent on visit 11 min   dyspnea